# Patient Record
Sex: MALE | Race: WHITE | NOT HISPANIC OR LATINO | Employment: OTHER | URBAN - METROPOLITAN AREA
[De-identification: names, ages, dates, MRNs, and addresses within clinical notes are randomized per-mention and may not be internally consistent; named-entity substitution may affect disease eponyms.]

---

## 2018-11-10 ENCOUNTER — OFFICE VISIT (OUTPATIENT)
Dept: URGENT CARE | Facility: CLINIC | Age: 78
End: 2018-11-10
Payer: MEDICARE

## 2018-11-10 VITALS
SYSTOLIC BLOOD PRESSURE: 129 MMHG | RESPIRATION RATE: 16 BRPM | OXYGEN SATURATION: 99 % | BODY MASS INDEX: 27.2 KG/M2 | TEMPERATURE: 97 F | HEART RATE: 67 BPM | WEIGHT: 190 LBS | DIASTOLIC BLOOD PRESSURE: 81 MMHG | HEIGHT: 70 IN

## 2018-11-10 DIAGNOSIS — H10.32 ACUTE BACTERIAL CONJUNCTIVITIS OF LEFT EYE: ICD-10-CM

## 2018-11-10 DIAGNOSIS — J01.10 ACUTE FRONTAL SINUSITIS, RECURRENCE NOT SPECIFIED: Primary | ICD-10-CM

## 2018-11-10 PROCEDURE — 99213 OFFICE O/P EST LOW 20 MIN: CPT | Mod: S$GLB,,, | Performed by: INTERNAL MEDICINE

## 2018-11-10 RX ORDER — AMOXICILLIN AND CLAVULANATE POTASSIUM 875; 125 MG/1; MG/1
1 TABLET, FILM COATED ORAL 2 TIMES DAILY
Qty: 20 TABLET | Refills: 0 | Status: SHIPPED | OUTPATIENT
Start: 2018-11-10 | End: 2018-11-20

## 2018-11-10 RX ORDER — PREDNISONE 20 MG/1
20 TABLET ORAL DAILY
Qty: 5 TABLET | Refills: 0 | Status: SHIPPED | OUTPATIENT
Start: 2018-11-10 | End: 2018-11-15

## 2018-11-10 NOTE — PATIENT INSTRUCTIONS
Acute Bacterial Rhinosinusitis (ABRS)    Acute bacterial rhinosinusitis (ABRS) is an infection of your nasal cavity and sinuses. Its caused by bacteria. Acute means that youve had symptoms for less than 12 weeks.  Understanding your sinuses  The nasal cavity is the large air-filled space behind your nose. The sinuses are a group of spaces formed by the bones of your face. They connect with your nasal cavity. ABRS causes the tissue lining these spaces to become inflamed. Mucus may not drain normally. This leads to facial pain and other symptoms.  What causes ABRS?  ABRS most often follows an upper respiratory infection caused by a virus. Bacteria then infect the lining of your nasal cavity and sinuses. But you can also get ABRS if you have:  · Nasal allergies  · Long-term nasal swelling and congestion not caused by allergies  · Blockage in the nose  Symptoms of ABRS  The symptoms of ABRS may be different for each person, and can include:  · Nasal congestion  · Runny nose  · Fluid draining from the nose down the throat (postnasal drip)  · Headache  · Cough  · Pain in the sinuses  · Thick, colored fluid from the nose (mucus)  · Fever  Diagnosing ABRS  ABRS may be diagnosed if youve had an upper respiratory infection like a cold and cough for longer than 10 to 14 days. Your health care provider will ask about your symptoms and your medical history. The provider will check your vital signs, including your temperature. Youll have a physical exam. The health care provider will check your ears, nose, and throat. You likely wont need any tests. If ABRS comes back, you may have a culture or other tests.  Treatment for ABRS  Treatment may include:  · Antibiotic medicine. This is for symptoms that last for at least 10 to 14 days.  · Nasal corticosteroid medicine. Drops or spray used in the nose can lessen swelling and congestion.  · Over-the-counter pain medicine. This is to lessen sinus pain and pressure.  · Nasal  decongestant medicine. Spray or drops may help to lessen congestion. Do not use them for more than a few days.  · Salt wash (saline irrigation). This can help to loosen mucus.  Possible complications of ABRS  ABRS may come back or become long-term (chronic).  In rare cases, ABRS may cause complications such as:   · Inflamed tissue around the brain and spinal cord (meningitis)  · Inflamed tissue around the eyes (orbital cellulitis)  · Inflamed bones around the sinuses (osteitis)  These problems may need to be treated in a hospital with intravenous (IV) antibiotic medicine or surgery.  When to call the health care provider  Call your health care provider if you have any of the following:  · Symptoms that dont get better, or get worse  · Symptoms that dont get better after 3 to 5 days on antibiotics  · Trouble seeing  · Swelling around your eyes  · Confusion or trouble staying awake   Date Last Reviewed: 3/3/2015  © 6509-2833 [email or web address]. [address] [place] [address]. All rights reserved. This information is not intended as a substitute for professional medical care. Always follow your healthcare professional's instructions.    Please return here or go to the Emergency Department for any concerns or worsening of condition.  If you were prescribed antibiotics, please take them to completion.  If you were prescribed a narcotic medication, do not drive or operate heavy equipment or machinery while taking these medications.  Please follow up with your primary care doctor or specialist as needed.    If you  smoke, please stop smoking.  1) Motrin/advil/ibuprofen- Take Two to Three Tablets(200 mg) three Times a Day for 5 to 7 Days.  2) Mucinex D 1/2 to 1 Tablet twice a day for 5 to 7 Days.  3) Drink Hot Liquids(coffee,WATER,Tea,Hot Chocolate,or Soup) that you put in a Mug place in Microwave for 2.5 to 3 minutes CHANGE THE CUP THAT WAS USED IN THE MICROWAVE SO AS NOT TO BURN YOUR MOUTH,then sniff  the steam from the cup and sip the heated liquid TEN TO TWELVE TIMES A DAY for 5 to 7 Days.  4) These 3 things will help the antibiotics and other medications work faster and will speed your recovery!

## 2018-11-10 NOTE — PROGRESS NOTES
"Subjective:       Patient ID: Jose Martin Hutchins is a 78 y.o. male.    Vitals:  height is 5' 10" (1.778 m) and weight is 86.2 kg (190 lb). His oral temperature is 97.4 °F (36.3 °C). His blood pressure is 129/81 and his pulse is 67. His respiration is 16 and oxygen saturation is 99%.     Chief Complaint: Eye Problem    Eye Problem    Both eyes are affected.This is a new problem. The current episode started yesterday. The problem occurs constantly. The problem has been gradually worsening. There is no known exposure to pink eye. He does not wear contacts. Associated symptoms include an eye discharge. Pertinent negatives include no eye redness, fever or nausea. The treatment provided no relief.     Review of Systems   Constitution: Negative for chills, fever and malaise/fatigue.   HENT: Positive for congestion. Negative for ear pain, hoarse voice and sore throat.    Eyes: Positive for discharge and pain. Negative for redness.   Cardiovascular: Negative for chest pain, dyspnea on exertion and leg swelling.   Respiratory: Negative for cough, shortness of breath, sputum production and wheezing.    Musculoskeletal: Negative for myalgias.   Gastrointestinal: Negative for abdominal pain and nausea.   Neurological: Negative for headaches.       Objective:      Physical Exam   Constitutional: He is oriented to person, place, and time. He appears well-developed and well-nourished. He is cooperative.  Non-toxic appearance. He does not appear ill. No distress.   HENT:   Head: Normocephalic and atraumatic.   Right Ear: Hearing, external ear and ear canal normal. Tympanic membrane is injected and erythematous.   Left Ear: Hearing, external ear and ear canal normal. Tympanic membrane is injected and erythematous.   Nose: Mucosal edema present. No rhinorrhea or nasal deformity. No epistaxis. Right sinus exhibits frontal sinus tenderness. Right sinus exhibits no maxillary sinus tenderness. Left sinus exhibits frontal sinus tenderness. " Left sinus exhibits no maxillary sinus tenderness.   Mouth/Throat: Uvula is midline and mucous membranes are normal. No trismus in the jaw. Normal dentition. No uvula swelling. Posterior oropharyngeal erythema present.       Eyes: Conjunctivae and lids are normal. No scleral icterus.   Sclera clear bilat   Neck: Trachea normal, full passive range of motion without pain and phonation normal. Neck supple.   Cardiovascular: Normal rate, regular rhythm, normal heart sounds, intact distal pulses and normal pulses.   Pulmonary/Chest: Effort normal and breath sounds normal. No respiratory distress.   Abdominal: Soft. Normal appearance and bowel sounds are normal. He exhibits no distension. There is no tenderness.   Musculoskeletal: Normal range of motion. He exhibits no edema or deformity.   Neurological: He is alert and oriented to person, place, and time. He exhibits normal muscle tone. Coordination normal.   Skin: Skin is warm, dry and intact. He is not diaphoretic. No pallor.   Psychiatric: He has a normal mood and affect. His speech is normal and behavior is normal. Judgment and thought content normal. Cognition and memory are normal.   Nursing note and vitals reviewed.      Assessment:       1. Acute frontal sinusitis, recurrence not specified    2. Acute bacterial conjunctivitis of left eye        Plan:         Acute frontal sinusitis, recurrence not specified  -     amoxicillin-clavulanate 875-125mg (AUGMENTIN) 875-125 mg per tablet; Take 1 tablet by mouth 2 (two) times daily. for 10 days  Dispense: 20 tablet; Refill: 0  -     predniSONE (DELTASONE) 20 MG tablet; Take 1 tablet (20 mg total) by mouth once daily. for 5 days  Dispense: 5 tablet; Refill: 0    Acute bacterial conjunctivitis of left eye  -     amoxicillin-clavulanate 875-125mg (AUGMENTIN) 875-125 mg per tablet; Take 1 tablet by mouth 2 (two) times daily. for 10 days  Dispense: 20 tablet; Refill: 0  -     predniSONE (DELTASONE) 20 MG tablet; Take 1 tablet  (20 mg total) by mouth once daily. for 5 days  Dispense: 5 tablet; Refill: 0       take meds

## 2019-07-10 PROBLEM — G61.81 CIDP (CHRONIC INFLAMMATORY DEMYELINATING POLYNEUROPATHY): Status: ACTIVE | Noted: 2019-07-10

## 2019-07-10 PROBLEM — I10 ESSENTIAL HYPERTENSION: Chronic | Status: ACTIVE | Noted: 2019-07-10

## 2019-07-10 PROBLEM — K21.9 GASTROESOPHAGEAL REFLUX DISEASE WITHOUT ESOPHAGITIS: Chronic | Status: ACTIVE | Noted: 2019-07-10

## 2019-07-10 PROBLEM — I48.0 PAROXYSMAL ATRIAL FIBRILLATION: Chronic | Status: ACTIVE | Noted: 2019-07-10

## 2019-07-10 PROBLEM — I25.10 CAD (CORONARY ARTERY DISEASE): Status: ACTIVE | Noted: 2019-07-10

## 2019-07-11 PROBLEM — R50.9 FEBRILE ILLNESS, ACUTE: Status: ACTIVE | Noted: 2019-07-11

## 2019-07-11 PROBLEM — R63.8 INCREASED NUTRITIONAL NEEDS: Status: ACTIVE | Noted: 2019-07-11

## 2019-07-12 PROBLEM — L89.313 PRESSURE INJURY OF RIGHT BUTTOCK, STAGE 3: Status: ACTIVE | Noted: 2019-07-12

## 2019-07-12 PROBLEM — R32 INCONTINENCE: Status: ACTIVE | Noted: 2019-07-12

## 2019-07-12 PROBLEM — S41.112A SKIN TEAR OF LEFT UPPER ARM WITHOUT COMPLICATION: Status: ACTIVE | Noted: 2019-07-12

## 2019-07-18 PROBLEM — I95.9 HYPOTENSION: Status: ACTIVE | Noted: 2019-07-18

## 2019-07-19 ENCOUNTER — HOSPITAL ENCOUNTER (INPATIENT)
Facility: HOSPITAL | Age: 79
LOS: 10 days | Discharge: NURSING, REHAB OR LONG-TERM CARE | DRG: 393 | End: 2019-07-29
Attending: FAMILY MEDICINE | Admitting: FAMILY MEDICINE
Payer: MEDICARE

## 2019-07-19 DIAGNOSIS — G61.81 CHRONIC INFLAMMATORY DEMYELINATING POLYNEURITIS: ICD-10-CM

## 2019-07-19 DIAGNOSIS — R34 DECREASED URINE OUTPUT: ICD-10-CM

## 2019-07-19 DIAGNOSIS — R94.31 PROLONGED QT INTERVAL: ICD-10-CM

## 2019-07-19 DIAGNOSIS — J18.9 PNEUMONIA OF LEFT UPPER LOBE DUE TO INFECTIOUS ORGANISM: ICD-10-CM

## 2019-07-19 DIAGNOSIS — G61.81 CIDP (CHRONIC INFLAMMATORY DEMYELINATING POLYNEUROPATHY): ICD-10-CM

## 2019-07-19 DIAGNOSIS — D64.9 ANEMIA, UNSPECIFIED TYPE: ICD-10-CM

## 2019-07-19 DIAGNOSIS — K86.9 PANCREATIC LESION: ICD-10-CM

## 2019-07-19 DIAGNOSIS — N17.9 AKI (ACUTE KIDNEY INJURY): Primary | ICD-10-CM

## 2019-07-19 DIAGNOSIS — R06.02 SOB (SHORTNESS OF BREATH): ICD-10-CM

## 2019-07-19 DIAGNOSIS — R07.9 CHEST PAIN: ICD-10-CM

## 2019-07-19 DIAGNOSIS — R91.8 LUNG INFILTRATE ON CT: ICD-10-CM

## 2019-07-19 DIAGNOSIS — I48.91 ATRIAL FIBRILLATION: ICD-10-CM

## 2019-07-19 DIAGNOSIS — K66.1 INTRAPERITONEAL HEMORRHAGE: ICD-10-CM

## 2019-07-19 DIAGNOSIS — A41.9 SEPSIS, DUE TO UNSPECIFIED ORGANISM: ICD-10-CM

## 2019-07-19 DIAGNOSIS — E11.65 TYPE 2 DIABETES MELLITUS WITH HYPERGLYCEMIA, WITHOUT LONG-TERM CURRENT USE OF INSULIN: ICD-10-CM

## 2019-07-19 DIAGNOSIS — R10.84 GENERALIZED ABDOMINAL PAIN: ICD-10-CM

## 2019-07-19 DIAGNOSIS — R94.31 LONG QT INTERVAL: ICD-10-CM

## 2019-07-19 PROBLEM — E11.9 TYPE 2 DIABETES MELLITUS, WITHOUT LONG-TERM CURRENT USE OF INSULIN: Status: ACTIVE | Noted: 2019-07-19

## 2019-07-19 PROBLEM — R56.9 SEIZURE-LIKE ACTIVITY: Status: ACTIVE | Noted: 2019-07-19

## 2019-07-19 PROBLEM — R78.81 BACTEREMIA: Status: ACTIVE | Noted: 2019-07-19

## 2019-07-19 PROBLEM — R33.9 URINARY RETENTION: Status: ACTIVE | Noted: 2019-07-12

## 2019-07-19 LAB
ABO + RH BLD: NORMAL
ALBUMIN SERPL BCP-MCNC: 3.5 G/DL (ref 3.5–5.2)
ALP SERPL-CCNC: 58 U/L (ref 55–135)
ALT SERPL W/O P-5'-P-CCNC: 42 U/L (ref 10–44)
ANION GAP SERPL CALC-SCNC: 15 MMOL/L (ref 8–16)
ANISOCYTOSIS BLD QL SMEAR: SLIGHT
AST SERPL-CCNC: 56 U/L (ref 10–40)
BACTERIA #/AREA URNS HPF: ABNORMAL /HPF
BASOPHILS # BLD AUTO: ABNORMAL K/UL (ref 0–0.2)
BASOPHILS NFR BLD: 0 % (ref 0–1.9)
BILIRUB SERPL-MCNC: 0.8 MG/DL (ref 0.1–1)
BILIRUB UR QL STRIP: NEGATIVE
BLD GP AB SCN CELLS X3 SERPL QL: NORMAL
BUN SERPL-MCNC: 54 MG/DL (ref 8–23)
CALCIUM SERPL-MCNC: 8.8 MG/DL (ref 8.7–10.5)
CHLORIDE SERPL-SCNC: 89 MMOL/L (ref 95–110)
CLARITY UR: CLEAR
CO2 SERPL-SCNC: 24 MMOL/L (ref 23–29)
COLOR UR: YELLOW
CREAT SERPL-MCNC: 2.8 MG/DL (ref 0.5–1.4)
DIFFERENTIAL METHOD: ABNORMAL
EOSINOPHIL # BLD AUTO: ABNORMAL K/UL (ref 0–0.5)
EOSINOPHIL NFR BLD: 0 % (ref 0–8)
ERYTHROCYTE [DISTWIDTH] IN BLOOD BY AUTOMATED COUNT: 15.9 % (ref 11.5–14.5)
EST. GFR  (AFRICAN AMERICAN): 24 ML/MIN/1.73 M^2
EST. GFR  (NON AFRICAN AMERICAN): 21 ML/MIN/1.73 M^2
ESTIMATED AVG GLUCOSE: 166 MG/DL (ref 68–131)
GLUCOSE SERPL-MCNC: 269 MG/DL (ref 70–110)
GLUCOSE UR QL STRIP: NEGATIVE
HBA1C MFR BLD HPLC: 7.4 % (ref 4–5.6)
HCT VFR BLD AUTO: 22.8 % (ref 40–54)
HCT VFR BLD AUTO: 23.8 % (ref 40–54)
HCT VFR BLD AUTO: 25.3 % (ref 40–54)
HCT VFR BLD AUTO: 26 % (ref 40–54)
HGB BLD-MCNC: 7.6 G/DL (ref 14–18)
HGB BLD-MCNC: 7.8 G/DL (ref 14–18)
HGB BLD-MCNC: 8.4 G/DL (ref 14–18)
HGB BLD-MCNC: 8.6 G/DL (ref 14–18)
HGB UR QL STRIP: ABNORMAL
HYALINE CASTS #/AREA URNS LPF: 0 /LPF
HYPOCHROMIA BLD QL SMEAR: ABNORMAL
KETONES UR QL STRIP: ABNORMAL
LACTATE SERPL-SCNC: 2.2 MMOL/L (ref 0.5–2.2)
LEUKOCYTE ESTERASE UR QL STRIP: NEGATIVE
LYMPHOCYTES # BLD AUTO: ABNORMAL K/UL (ref 1–4.8)
LYMPHOCYTES NFR BLD: 4 % (ref 18–48)
MAGNESIUM SERPL-MCNC: 2.1 MG/DL (ref 1.6–2.6)
MCH RBC QN AUTO: 30.4 PG (ref 27–31)
MCHC RBC AUTO-ENTMCNC: 33.1 G/DL (ref 32–36)
MCV RBC AUTO: 92 FL (ref 82–98)
MICROSCOPIC COMMENT: ABNORMAL
MONOCYTES # BLD AUTO: ABNORMAL K/UL (ref 0.3–1)
MONOCYTES NFR BLD: 4 % (ref 4–15)
MYELOCYTES NFR BLD MANUAL: 5 %
NEUTROPHILS NFR BLD: 84 % (ref 38–73)
NEUTS BAND NFR BLD MANUAL: 3 %
NITRITE UR QL STRIP: NEGATIVE
PH UR STRIP: 5 [PH] (ref 5–8)
PHOSPHATE SERPL-MCNC: 5.9 MG/DL (ref 2.7–4.5)
PLATELET # BLD AUTO: 304 K/UL (ref 150–350)
PLATELET BLD QL SMEAR: ABNORMAL
PMV BLD AUTO: 9.4 FL (ref 9.2–12.9)
POCT GLUCOSE: 252 MG/DL (ref 70–110)
POCT GLUCOSE: 302 MG/DL (ref 70–110)
POIKILOCYTOSIS BLD QL SMEAR: SLIGHT
POLYCHROMASIA BLD QL SMEAR: ABNORMAL
POTASSIUM SERPL-SCNC: 5.3 MMOL/L (ref 3.5–5.1)
PROCALCITONIN SERPL IA-MCNC: 2.2 NG/ML
PROT SERPL-MCNC: 5.2 G/DL (ref 6–8.4)
PROT UR QL STRIP: ABNORMAL
RBC # BLD AUTO: 2.83 M/UL (ref 4.6–6.2)
RBC #/AREA URNS HPF: 5 /HPF (ref 0–4)
SODIUM SERPL-SCNC: 128 MMOL/L (ref 136–145)
SP GR UR STRIP: 1.02 (ref 1–1.03)
SPHEROCYTES BLD QL SMEAR: ABNORMAL
STOMATOCYTES BLD QL SMEAR: PRESENT
TSH SERPL DL<=0.005 MIU/L-ACNC: 0.61 UIU/ML (ref 0.4–4)
URN SPEC COLLECT METH UR: ABNORMAL
UROBILINOGEN UR STRIP-ACNC: NEGATIVE EU/DL
VANCOMYCIN SERPL-MCNC: 3.2 UG/ML
WBC # BLD AUTO: 28.41 K/UL (ref 3.9–12.7)
WBC #/AREA URNS HPF: 3 /HPF (ref 0–5)

## 2019-07-19 PROCEDURE — 83605 ASSAY OF LACTIC ACID: CPT

## 2019-07-19 PROCEDURE — S5571 INSULIN DISPOS PEN 3 ML: HCPCS | Performed by: FAMILY MEDICINE

## 2019-07-19 PROCEDURE — 84145 PROCALCITONIN (PCT): CPT

## 2019-07-19 PROCEDURE — 85027 COMPLETE CBC AUTOMATED: CPT | Mod: 91

## 2019-07-19 PROCEDURE — 99222 PR INITIAL HOSPITAL CARE,LEVL II: ICD-10-PCS | Mod: ,,, | Performed by: STUDENT IN AN ORGANIZED HEALTH CARE EDUCATION/TRAINING PROGRAM

## 2019-07-19 PROCEDURE — 27000221 HC OXYGEN, UP TO 24 HOURS

## 2019-07-19 PROCEDURE — 36415 COLL VENOUS BLD VENIPUNCTURE: CPT

## 2019-07-19 PROCEDURE — 85018 HEMOGLOBIN: CPT | Mod: 91

## 2019-07-19 PROCEDURE — 95816 PR EEG,W/AWAKE & DROWSY RECORD: ICD-10-PCS | Mod: 26,,, | Performed by: PSYCHIATRY & NEUROLOGY

## 2019-07-19 PROCEDURE — 86901 BLOOD TYPING SEROLOGIC RH(D): CPT

## 2019-07-19 PROCEDURE — 87040 BLOOD CULTURE FOR BACTERIA: CPT

## 2019-07-19 PROCEDURE — 95816 EEG AWAKE AND DROWSY: CPT | Mod: 26,,, | Performed by: PSYCHIATRY & NEUROLOGY

## 2019-07-19 PROCEDURE — 83036 HEMOGLOBIN GLYCOSYLATED A1C: CPT

## 2019-07-19 PROCEDURE — 99222 1ST HOSP IP/OBS MODERATE 55: CPT | Mod: ,,, | Performed by: STUDENT IN AN ORGANIZED HEALTH CARE EDUCATION/TRAINING PROGRAM

## 2019-07-19 PROCEDURE — 85014 HEMATOCRIT: CPT | Mod: 91

## 2019-07-19 PROCEDURE — 63600175 PHARM REV CODE 636 W HCPCS: Performed by: FAMILY MEDICINE

## 2019-07-19 PROCEDURE — 83735 ASSAY OF MAGNESIUM: CPT

## 2019-07-19 PROCEDURE — 25000003 PHARM REV CODE 250: Performed by: FAMILY MEDICINE

## 2019-07-19 PROCEDURE — 95819 EEG AWAKE AND ASLEEP: CPT

## 2019-07-19 PROCEDURE — 94761 N-INVAS EAR/PLS OXIMETRY MLT: CPT

## 2019-07-19 PROCEDURE — 81000 URINALYSIS NONAUTO W/SCOPE: CPT

## 2019-07-19 PROCEDURE — 80053 COMPREHEN METABOLIC PANEL: CPT | Mod: 91

## 2019-07-19 PROCEDURE — 84100 ASSAY OF PHOSPHORUS: CPT

## 2019-07-19 PROCEDURE — 20000000 HC ICU ROOM

## 2019-07-19 PROCEDURE — 80202 ASSAY OF VANCOMYCIN: CPT

## 2019-07-19 PROCEDURE — 86920 COMPATIBILITY TEST SPIN: CPT

## 2019-07-19 PROCEDURE — 84443 ASSAY THYROID STIM HORMONE: CPT

## 2019-07-19 PROCEDURE — 85007 BL SMEAR W/DIFF WBC COUNT: CPT | Mod: 91

## 2019-07-19 RX ORDER — NYSTATIN 100000 [USP'U]/ML
500000 SUSPENSION ORAL
Status: DISCONTINUED | OUTPATIENT
Start: 2019-07-19 | End: 2019-07-22

## 2019-07-19 RX ORDER — SODIUM CHLORIDE 0.9 % (FLUSH) 0.9 %
10 SYRINGE (ML) INJECTION
Status: DISCONTINUED | OUTPATIENT
Start: 2019-07-19 | End: 2019-07-29 | Stop reason: HOSPADM

## 2019-07-19 RX ORDER — INSULIN ASPART 100 [IU]/ML
1-10 INJECTION, SOLUTION INTRAVENOUS; SUBCUTANEOUS
Status: DISCONTINUED | OUTPATIENT
Start: 2019-07-19 | End: 2019-07-20

## 2019-07-19 RX ORDER — DEXTROSE 50 % IN WATER (D50W) INTRAVENOUS SYRINGE
12.5
Status: DISCONTINUED | OUTPATIENT
Start: 2019-07-19 | End: 2019-07-20

## 2019-07-19 RX ORDER — FAMOTIDINE 10 MG/ML
20 INJECTION INTRAVENOUS DAILY
Status: DISCONTINUED | OUTPATIENT
Start: 2019-07-20 | End: 2019-07-28

## 2019-07-19 RX ORDER — FOLIC ACID 1 MG/1
1 TABLET ORAL DAILY
Status: DISCONTINUED | OUTPATIENT
Start: 2019-07-19 | End: 2019-07-29 | Stop reason: HOSPADM

## 2019-07-19 RX ORDER — POLYETHYLENE GLYCOL 3350 17 G/17G
17 POWDER, FOR SOLUTION ORAL DAILY
Status: DISCONTINUED | OUTPATIENT
Start: 2019-07-19 | End: 2019-07-21

## 2019-07-19 RX ORDER — DEXTROSE 50 % IN WATER (D50W) INTRAVENOUS SYRINGE
12.5
Status: DISCONTINUED | OUTPATIENT
Start: 2019-07-19 | End: 2019-07-29 | Stop reason: HOSPADM

## 2019-07-19 RX ORDER — NOREPINEPHRINE BITARTRATE/D5W 16MG/250ML
0.02 PLASTIC BAG, INJECTION (ML) INTRAVENOUS CONTINUOUS
Status: DISCONTINUED | OUTPATIENT
Start: 2019-07-19 | End: 2019-07-22

## 2019-07-19 RX ORDER — INSULIN ASPART 100 [IU]/ML
1-10 INJECTION, SOLUTION INTRAVENOUS; SUBCUTANEOUS
Status: DISCONTINUED | OUTPATIENT
Start: 2019-07-19 | End: 2019-07-19 | Stop reason: SDUPTHER

## 2019-07-19 RX ORDER — DULOXETIN HYDROCHLORIDE 30 MG/1
30 CAPSULE, DELAYED RELEASE ORAL DAILY
Status: DISCONTINUED | OUTPATIENT
Start: 2019-07-19 | End: 2019-07-29 | Stop reason: HOSPADM

## 2019-07-19 RX ORDER — ACETAMINOPHEN 325 MG/1
650 TABLET ORAL EVERY 8 HOURS PRN
Status: DISCONTINUED | OUTPATIENT
Start: 2019-07-19 | End: 2019-07-29 | Stop reason: HOSPADM

## 2019-07-19 RX ORDER — NITROGLYCERIN 0.4 MG/1
0.4 TABLET SUBLINGUAL EVERY 5 MIN PRN
Status: DISCONTINUED | OUTPATIENT
Start: 2019-07-19 | End: 2019-07-29 | Stop reason: HOSPADM

## 2019-07-19 RX ORDER — HYDROCODONE BITARTRATE AND ACETAMINOPHEN 500; 5 MG/1; MG/1
TABLET ORAL
Status: DISCONTINUED | OUTPATIENT
Start: 2019-07-19 | End: 2019-07-29 | Stop reason: HOSPADM

## 2019-07-19 RX ORDER — PREDNISONE 20 MG/1
20 TABLET ORAL DAILY
Status: DISCONTINUED | OUTPATIENT
Start: 2019-07-19 | End: 2019-07-19

## 2019-07-19 RX ORDER — HYDROCHLOROTHIAZIDE 25 MG/1
25 TABLET ORAL DAILY
Status: DISCONTINUED | OUTPATIENT
Start: 2019-07-19 | End: 2019-07-19

## 2019-07-19 RX ORDER — IBUPROFEN 200 MG
24 TABLET ORAL
Status: DISCONTINUED | OUTPATIENT
Start: 2019-07-19 | End: 2019-07-29 | Stop reason: HOSPADM

## 2019-07-19 RX ORDER — PANTOPRAZOLE SODIUM 40 MG/1
40 TABLET, DELAYED RELEASE ORAL DAILY
Status: DISCONTINUED | OUTPATIENT
Start: 2019-07-19 | End: 2019-07-19

## 2019-07-19 RX ORDER — SODIUM CHLORIDE 9 MG/ML
INJECTION, SOLUTION INTRAVENOUS CONTINUOUS
Status: DISCONTINUED | OUTPATIENT
Start: 2019-07-19 | End: 2019-07-20

## 2019-07-19 RX ORDER — DEXTROSE 50 % IN WATER (D50W) INTRAVENOUS SYRINGE
25
Status: DISCONTINUED | OUTPATIENT
Start: 2019-07-19 | End: 2019-07-20

## 2019-07-19 RX ORDER — GLUCAGON 1 MG
1 KIT INJECTION
Status: DISCONTINUED | OUTPATIENT
Start: 2019-07-19 | End: 2019-07-20

## 2019-07-19 RX ORDER — IBUPROFEN 200 MG
16 TABLET ORAL
Status: DISCONTINUED | OUTPATIENT
Start: 2019-07-19 | End: 2019-07-29 | Stop reason: HOSPADM

## 2019-07-19 RX ORDER — VANCOMYCIN HCL IN 5 % DEXTROSE 1G/250ML
1000 PLASTIC BAG, INJECTION (ML) INTRAVENOUS
Status: DISCONTINUED | OUTPATIENT
Start: 2019-07-19 | End: 2019-07-22

## 2019-07-19 RX ORDER — NAPROXEN SODIUM 220 MG/1
81 TABLET, FILM COATED ORAL DAILY
Status: DISCONTINUED | OUTPATIENT
Start: 2019-07-19 | End: 2019-07-20

## 2019-07-19 RX ORDER — OMEGA-3-ACID ETHYL ESTERS 1 G/1
2 CAPSULE, LIQUID FILLED ORAL 2 TIMES DAILY
Status: DISCONTINUED | OUTPATIENT
Start: 2019-07-19 | End: 2019-07-19 | Stop reason: DRUGHIGH

## 2019-07-19 RX ORDER — HEPARIN SODIUM 5000 [USP'U]/ML
5000 INJECTION, SOLUTION INTRAVENOUS; SUBCUTANEOUS EVERY 8 HOURS
Status: DISCONTINUED | OUTPATIENT
Start: 2019-07-19 | End: 2019-07-20

## 2019-07-19 RX ORDER — METOPROLOL SUCCINATE 25 MG/1
25 TABLET, EXTENDED RELEASE ORAL DAILY
Status: DISCONTINUED | OUTPATIENT
Start: 2019-07-19 | End: 2019-07-19

## 2019-07-19 RX ORDER — LEVETIRACETAM 15 MG/ML
1500 INJECTION INTRAVASCULAR ONCE
Status: DISCONTINUED | OUTPATIENT
Start: 2019-07-19 | End: 2019-07-19

## 2019-07-19 RX ORDER — OXYBUTYNIN CHLORIDE 5 MG/1
5 TABLET ORAL 2 TIMES DAILY
Status: DISCONTINUED | OUTPATIENT
Start: 2019-07-19 | End: 2019-07-20

## 2019-07-19 RX ORDER — GLUCAGON 1 MG
1 KIT INJECTION
Status: DISCONTINUED | OUTPATIENT
Start: 2019-07-19 | End: 2019-07-29 | Stop reason: HOSPADM

## 2019-07-19 RX ORDER — LEVETIRACETAM 5 MG/ML
500 INJECTION INTRAVASCULAR EVERY 12 HOURS
Status: DISCONTINUED | OUTPATIENT
Start: 2019-07-20 | End: 2019-07-19

## 2019-07-19 RX ORDER — FLUCONAZOLE 100 MG/1
100 TABLET ORAL DAILY
Status: DISCONTINUED | OUTPATIENT
Start: 2019-07-19 | End: 2019-07-19 | Stop reason: SDUPTHER

## 2019-07-19 RX ORDER — TAMSULOSIN HYDROCHLORIDE 0.4 MG/1
0.4 CAPSULE ORAL DAILY
Status: DISCONTINUED | OUTPATIENT
Start: 2019-07-19 | End: 2019-07-29 | Stop reason: HOSPADM

## 2019-07-19 RX ORDER — GABAPENTIN 300 MG/1
600 CAPSULE ORAL 3 TIMES DAILY
Status: DISCONTINUED | OUTPATIENT
Start: 2019-07-19 | End: 2019-07-19

## 2019-07-19 RX ORDER — CHOLECALCIFEROL (VITAMIN D3) 25 MCG
1000 TABLET ORAL DAILY
Status: DISCONTINUED | OUTPATIENT
Start: 2019-07-19 | End: 2019-07-19

## 2019-07-19 RX ORDER — DEXTROSE 50 % IN WATER (D50W) INTRAVENOUS SYRINGE
25
Status: DISCONTINUED | OUTPATIENT
Start: 2019-07-19 | End: 2019-07-29 | Stop reason: HOSPADM

## 2019-07-19 RX ORDER — PANTOPRAZOLE SODIUM 40 MG/10ML
40 INJECTION, POWDER, LYOPHILIZED, FOR SOLUTION INTRAVENOUS DAILY
Status: DISCONTINUED | OUTPATIENT
Start: 2019-07-19 | End: 2019-07-19

## 2019-07-19 RX ADMIN — INSULIN DETEMIR 12 UNITS: 100 INJECTION, SOLUTION SUBCUTANEOUS at 09:07

## 2019-07-19 RX ADMIN — VANCOMYCIN HYDROCHLORIDE 1000 MG: 1 INJECTION, POWDER, LYOPHILIZED, FOR SOLUTION INTRAVENOUS at 11:07

## 2019-07-19 RX ADMIN — INSULIN ASPART 8 UNITS: 100 INJECTION, SOLUTION INTRAVENOUS; SUBCUTANEOUS at 03:07

## 2019-07-19 RX ADMIN — PIPERACILLIN AND TAZOBACTAM 4.5 G: 4; .5 INJECTION, POWDER, LYOPHILIZED, FOR SOLUTION INTRAVENOUS; PARENTERAL at 11:07

## 2019-07-19 RX ADMIN — SODIUM CHLORIDE: 0.9 INJECTION, SOLUTION INTRAVENOUS at 03:07

## 2019-07-19 RX ADMIN — PIPERACILLIN AND TAZOBACTAM 4.5 G: 4; .5 INJECTION, POWDER, LYOPHILIZED, FOR SOLUTION INTRAVENOUS; PARENTERAL at 08:07

## 2019-07-19 NOTE — ASSESSMENT & PLAN NOTE
Pt takes oral meds at home.   BG upon admission 269.   Insulin determir 12 units QhS along with moderate dose correcting scale.   Will continue to monitor.

## 2019-07-19 NOTE — PLAN OF CARE
Pt arrived via EMS from [facility]. When patient transferred from stretcher to bed, he had an episode where his eyes rolled back and he was unarrousbale. This episode lasted for 30seconds. Pts SBP 70s. Increased levophed and patient became responsive.  Pt followed commands and oriented to self, , and states he in the hospital. Pt disoriented to time. RR even and unlabored. Pt sats 100% on 2L NC. ST on monitor. NG=785. Afebrile. See VSS per flowsheet. Paged MD that patient is here.

## 2019-07-19 NOTE — ASSESSMENT & PLAN NOTE
Diagnosed several months ago and was on Prednisone and was initially admitted to [facility] for elective plasmapheresis.   Per wife symptoms were worsening.   Neuro consulted: Recs to hold plasma exchange given concern for iatrogenic coagulapathy.   Will wean steroids to 15 mg qd per neuro recs.   Consider IVIG once more hemodynamically stable.

## 2019-07-19 NOTE — PROGRESS NOTES
Pharmacokinetic Initial Assessment: IV Vancomycin    Assessment/Plan:    Initiate intravenous vancomycin with loading dose of n/a  once followed by a maintenance dose of vancomycin 1000mg IV every 24 hours  Desired empiric serum trough concentration is 10 to 20 mcg/mL.  Draw vancomycin trough level 30 min prior to third dose on 7/21 at approximately 0930   Pharmacy will continue to follow and monitor vancomycin.      Please contact pharmacy at extension [phone] with any questions regarding this assessment.     Thank you for the consult,   Elisa Toro     Patient brief summary:  Jose Martin Hutchins is a 78 y.o. male initiated on antimicrobial therapy with IV Vancomycin for treatment of suspected bacteremia    Drug Allergies:   Review of patient's allergies indicates:   Allergen Reactions    Sulfa (sulfonamide antibiotics) Hives    Ramucirumab Palpitations       Actual Body Weight:   77.1 kg  Renal Function:   Estimated Creatinine Clearance: 30.9 mL/min (A) (based on SCr of 2 mg/dL (H)).,     Dialysis Method (if applicable):  N/a     CBC (last 72 hours):  Recent Labs   Lab Result Units 07/17/19  0503 07/18/19  1309 07/18/19  1648 07/18/19  2107 07/19/19  0100 07/19/19  0435   WBC K/uL 13.30* 26.60*  --   --   --  37.60*   Hemoglobin g/dL 11.7* 10.4* 9.0* 8.6* 9.3* 9.4*   Hematocrit % 34.9* 31.7* 28.0* 26.5* 29.9* 29.3*   Platelets K/uL 271 352*  --   --   --  323   Gran% %  --  87.0*  --   --   --  82.0*   Lymph% %  --  3.0*  --   --   --  5.0*   Mono% %  --  4.0  --   --   --  1.0*   Eosinophil% %  --  0.0  --   --   --  0.0   Basophil% %  --  0.0  --   --   --  0.0   Differential Method   --  Manual  --   --   --  Manual       Metabolic Panel (last 72 hours):  Recent Labs   Lab Result Units 07/17/19  0503 07/18/19  0512 07/18/19  1309 07/19/19  0435   Sodium mmol/L 129* 129* 130* 130*   Potassium mmol/L 4.2 4.2 4.2 5.1   Chloride mmol/L 86* 88* 92* 91*   CO2 mmol/L 33* 32* 27 25   Glucose mg/dL 152* 154* 127*  203*   BUN, Bld mg/dL 35* 32* 37* 48*   Creatinine mg/dL 0.90 0.80 1.20 2.00*   Albumin g/dL 3.1* 3.7 3.3* 3.5   Total Bilirubin mg/dL 0.5 0.9 0.8 1.4*   Alkaline Phosphatase U/L 52 24* 29* 48   AST U/L 84* 56 63* 53   ALT U/L 96* 54* 61* 63*   Magnesium mg/dL  --   --  2.2  --    Phosphorus mg/dL  --   --  3.20  --        Drug levels (last 3 results):  No results for input(s): VANCOMYCINRA, VANCOMYCINPE, VANCOMYCINTR in the last 72 hours.    Microbiologic Results:  Microbiology Results (last 7 days)       ** No results found for the last 168 hours. **

## 2019-07-19 NOTE — H&P
[facility] Hospital Medicine  History & Physical    Patient Name: Jose Martin Hutchins  MRN: [id number]  Admission Date: 7/19/2019  Attending Physician: Al Boone III, MD   Primary Care Provider: Sam Washington MD         Patient information was obtained from patient, spouse/SO and ER records.     Subjective:     Principal Problem:Intraperitoneal hemorrhage    Chief Complaint: No chief complaint on file.       HPI: 77 yo male with pmhx of HTN, CAD, HLD, CKD, GERD, RA, CIDP, Guillain Burre syndrome and A.fib was transferred from [facility] for IR to drain his intraperitoneal hemorrhage. He was admitted in [facility] 8 days ago. He was initially admitted for elective plasmapheresis since his CIDP was worsening. During his stay he became septic and was found to have UTI. He was treated for UTI. While he was in the general floor, he developed fever, hypotensive and became hemodynamically unstable with a drop in his h/h. He had a CT abdomen done which was positive for intraperitoneal hemorrhage that needed to be assessed by IR for possible draining and embolizing the source.   During his transfer to [facility], EMS noticed that the pt had few episodes where his eyes rolled back and his body was shaking. He was also noticed to have similar episode when he was first seen in the ICU. During those episodes his BP was noticeably low as well. Pt was awake upon verbal command. He knows his name but is not oriented to time. He denies any chest pain, sob, nausea, vomiting. He does complaint of pain in his right side of the abdomen.  He has elevated WBC, but no growth from cultures in the outside facility.            No new subjective & objective note has been filed under this hospital service since the last note was generated.    Assessment/Plan:     * Intraperitoneal hemorrhage  Pt transferred from [facility] with the concern for intraperitoneal hemorrhage for IR consult.   CT abdomen: Acute approximately 15  x 4 cm hematoma within the region of the lesser sac with signs of active contrast extravasation.  2. Small amount of perihepatic fluid.  CTA abdomen: Left upper lobe pneumonia. Trace left pleural fluid collection. Mild dependent atelectasis bilaterally.  IR consulted: will appreciate recs.   GS consulted: will appreciate recs.   Will trend H/H q6hs.       ANDIE (acute kidney injury)  BUN/Cr upon admission: 54/2.8. Baseline 35/1.2.   Likely 2/2 medications vs intravascular volume depletion.   Will start IVF and continue to monitor.   Avoid nephrotoxic agents.       Type 2 diabetes mellitus, without long-term current use of insulin  Pt takes oral meds at home.   BG upon admission 269.   Insulin determir 12 units QhS along with moderate dose correcting scale.   Will continue to monitor.       Sepsis  Likely 2/2 PNA vs intraperitoneal hemorrhage vs hospital acquired PNA.   WBC upon admission was 28, pt hypotensive requiring pressors.   clx from the outside facility with no growth. Pt was already on Vanc and Zosyn from the outside facility.   LA, procalc ordered.   Vanc and zosyn continued.   Levophed continued.   Blood cultures, sputum cultures reordered.   CTA: positive for possible PNA in the left lower lobe.   ID consulted.       Seizure-like activity  Seizure like activity noticed, likely 2/2 hypotensive episodes.  EEG ordered.   Neuro consulted  Neuro checks q4h, will do stat CT with worsening mental status or decreased LOC.   Speech eval for swallowing.   2 mg Ativan IV for generalized seizures lasting > 5 min  Delirium and seizure precautions.       Urinary retention  Pt presented from the [facility] with the becerril for urinary retention.   Minimal urine output.  Increase in Bun/Cr. Will initiate fluids.   Will monitor I/O's.         Pressure injury of right buttock, stage 3  Wound care consulted.       Paroxysmal atrial fibrillation  Pt had A.fib in the outside facility, but upon admission he was NSR.    Will continue to monitor.     Essential hypertension  Will hold bp meds for now, since pt is hypotensive and requiring pressors.       CIDP (chronic inflammatory demyelinating polyneuropathy)  Diagnosed several months ago and was on Prednisone and was initially admitted to [facility] for elective plasmapheresis.   Per wife symptoms were worsening.   Neuro consulted: Recs to hold plasma exchange given concern for iatrogenic coagulapathy.   Will wean steroids to 15 mg qd per neuro recs.   Consider IVIG once more hemodynamically stable.         VTE Risk Mitigation (From admission, onward)        Ordered     heparin (porcine) injection 5,000 Units  Every 8 hours      07/19/19 1452     IP VTE HIGH RISK PATIENT  Once      07/19/19 1452     Place JANE hose  Until discontinued      07/19/19 0910     Place sequential compression device  Until discontinued      07/19/19 0910        Critical care time spent on the evaluation and treatment of severe organ dysfunction, review of pertinent labs and imaging studies, discussions with consulting providers and discussions with patient/family: 60 minutes.     Tamara Campos MD  Department of Hospital Medicine   [facility]

## 2019-07-19 NOTE — HPI
79 yo male with pmhx of HTN, CAD, HLD, CKD, GERD, RA, CIDP, Guillain Burre syndrome and A.fib was transferred from [facility] for IR to drain his intraperitoneal hemorrhage. He was admitted in [facility] 8 days ago. He was initially admitted for elective plasmapheresis since his CIDP was worsening. During his stay he became septic and was found to have UTI. He was treated for UTI. While he was in the general floor, he developed fever, hypotensive and became hemodynamically unstable with a drop in his h/h. He had a CT abdomen done which was positive for intraperitoneal hemorrhage that needed to be assessed by IR for possible draining and embolizing the source.   During his transfer to [facility], EMS noticed that the pt had few episodes where his eyes rolled back and his body was shaking. He was also noticed to have similar episode when he was first seen in the ICU. During those episodes his BP was noticeably low as well. Pt was awake upon verbal command. He knows his name but is not oriented to time. He denies any chest pain, sob, nausea, vomiting. He does complaint of pain in his right side of the abdomen.  He has elevated WBC, but no growth from cultures in the outside facility.

## 2019-07-19 NOTE — PROCEDURES
ROUTINE ELECTROENCEPHALOGRAM REPORT      Jose Martin Hutchins  [id number]  1940    DATE OF SERVICE: 7/19/19    REQUESTING PROVIDER: Tamara Campos MD    METHODOLOGY   Electroencephalographic (EEG) recording is with electrodes placed according to the International 10-20 placement system.  Thirty two (32) channels of digital signal (sampling rate of 512/sec) including T1 and T2 was simultaneously recorded from the scalp and may include  EKG, EMG, and/or eye monitors.  Recording band pass was 0.1 to 512 hz.  Digital video recording of the patient is simultaneously recorded with the EEG.  The patient is instructed report clinical symptoms which may occur during the recording session.  EEG and video recording is stored and archived in digital format. Activation procedures which include photic stimulation, hyperventilation and instructing patients to perform simple task are done in selected patients.    The EEG is displayed on a monitor screen and can be reviewed using different montages.  Computer assisted analysis is employed to detect spike and electrographic seizure activity.   The entire record is submitted for computer analysis.  The entire recording is visually reviewed and the times identified by computer analysis as being spikes or seizures are reviewed again.  Compresses spectral analysis (CSA) is also performed on the activity recorded from each individual channel.  This is displayed as a power display of frequencies from 0 to 30 Hz over time.   The CSA is reviewed looking for asymmetries in power between homologous areas of the scalp and then compared with the original EEG recording.     [employer] software was also utilized in the review of this study.  This software suite analyzes the EEG recording in multiple domains.  Coherence and rhythmicity is computed to identify EEG sections which may contain organized seizures.  Each channel undergoes analysis to detect presence of spike and sharp waves which have  special and morphological characteristic of epileptic activity.  The routine EEG recording is converted from spacial into frequency domain.  This is then displayed comparing homologous areas to identify areas of significant asymmetry.  Algorithm to identify non-cortically generated artifact is used to separate eye movement, EMG and other artifact from the EEG    EEG FINDINGS  Background activity:   The background rhythm was characterized by theta (5-6 Hz) activity   No posterior dominant rhythm seen.   Symmetry and continuity: The background was continuous and symmetric    Sleep:   Not seen.    Activation procedures:   Hyperventilation and photic stimulation were not performed    Abnormal activity:   No epileptiform discharges, periodic discharges, lateralized rhythmic delta activity or electrographic seizures were seen.    EKG:   Regular rhythm seen    IMPRESSION:   This is an abnormal routine EEG due to the moderate generalized slowing seen, suggestive of moderate diffuse or multifocal cerebral dysfunction.  No epileptiform activity or electrographic seizures seen.    CLINICAL CORRELATION IS RECOMMENDED.    Mariel Coker MD, LUL(), DARIO CARTAGENA.  Neurology-Epilepsy.  [facility].

## 2019-07-19 NOTE — ASSESSMENT & PLAN NOTE
Likely 2/2 PNA vs intraperitoneal hemorrhage vs hospital acquired PNA.   WBC upon admission was 28, pt hypotensive requiring pressors.   clx from the outside facility with no growth. Pt was already on Vanc and Zosyn from the outside facility.   LA, procalc ordered.   Vanc and zosyn continued.   Levophed continued.   Blood cultures, sputum cultures reordered.   CTA: positive for possible PNA in the left lower lobe.   ID consulted.

## 2019-07-19 NOTE — ASSESSMENT & PLAN NOTE
Pt presented from the [facility] with the becerril for urinary retention.   Lasix was started  Urine output was 2.9L  Continue to monitor

## 2019-07-19 NOTE — CONSULTS
"NEUROLOGY FLOOR CONSULT    Reason for consult:  Seizure like event    Informant:  Patient' family       Other sources of information : past medical records    CC:  "seizure"    HPI:   Jose Martin Hutchins is a 78 y.o. man w/ hx of HTN, CAD, P-afib, recent diagnosis of CIDP, recurrent infections admitted to ICU as direct transfer from [facility] for higher level of care and interventional radiology services due to suspected retroperitoneal hematoma discovered after patient became acutely hypotensive/hemodynamically unstable w/ drop in H/H. Patient had been admitted originally for elective plasmapheresis which had been planned with outpatient neurologist after failing to respond to oral steroids in the ambulatory setting. His current hospital course was initially complicated by UTI/urinary retention/worsening weakness, however this improved after initiation of antibiotics. Per discussion with his wife at bedside, he was diagnosed with CIDP months ago, however over the last several weeks he acutely worsened to the point where he was unable to move his limbs or feed himself. Family reports significant improvement following initial 2 plasma exchange treatments, before he decompensated again due to internal bleeding +/- underlying infection. Upon arrival to unit this morning, patient was noted to have gaze deviation and was briefly unresponsive when transferred from stretcher to bed, his SBP was in the 70s at the time, neurology consulted for possible seizure activity.     Patient admits to compliance most of the time    ROS: facial/extremity paraesthesia, weakness    Histories:     Allergies:  Sulfa (sulfonamide antibiotics) and Ramucirumab    Current Medications:    Current Facility-Administered Medications   Medication Dose Route Frequency Provider Last Rate Last Dose    acetaminophen tablet 650 mg  650 mg Oral Q8H PRN Tamara Campos MD        aspirin chewable tablet 81 mg  81 mg Oral Daily Tamara Campos MD    "     dextrose 50 % in water (D50W) injection 12.5 g  12.5 g Intravenous PRN Tamara Campos MD        dextrose 50 % in water (D50W) injection 25 g  25 g Intravenous PRN Tamara Campos MD        DULoxetine DR capsule 30 mg  30 mg Oral Daily Tamara Campos MD        folic acid tablet 1 mg  1 mg Oral Daily Tamara Campos MD        glucagon (human recombinant) injection 1 mg  1 mg Intramuscular PRN Tamara Campos MD        glucose chewable tablet 16 g  16 g Oral PRN Tamara Campos MD        glucose chewable tablet 24 g  24 g Oral PRN Tamara Campos MD        hydroCHLOROthiazide tablet 25 mg  25 mg Oral Daily Tamara Campos MD        insulin aspart U-100 pen 1-10 Units  1-10 Units Subcutaneous QID (AC + HS) PRN Tamara Camops MD        insulin detemir U-100 pen 12 Units  12 Units Subcutaneous QHS Tamara Campos MD        Lactobacillus acidoph-L.bulgar 1 million cell tablet 1 tablet  1 tablet Oral TID  Tamara Campos MD        magic mouthwash (diphenhydrAMINE 12.5 mg/5 mL 20 mL, aluminum & magnesium hydroxide-simethicone (MYLANTA) 20 mL, lidocaine HCl 2% (XYLOCAINE) 20 mL) solution  15 mL Swish & Spit Q4H PRN Tamara Campos MD        metoprolol succinate (TOPROL-XL) 24 hr tablet 25 mg  25 mg Oral Daily Tamara Campos MD        nitroGLYCERIN SL tablet 0.4 mg  0.4 mg Sublingual Q5 Min PRN Tamara Campos MD        norepinephrine bitartrate-D5W 16 mg/250 mL (64 mcg/mL) infusion  0.02 mcg/kg/min Intravenous Continuous Tamara Campos MD 5.8 mL/hr at 07/19/19 0942 0.08 mcg/kg/min at 07/19/19 0942    nystatin 100,000 unit/mL suspension 500,000 Units  500,000 Units Oral TID  Tamara Campos MD        omega-3 fatty acids-fish oil 340-1,000 mg capsule 1 capsule  1 capsule Oral Daily Jr Spivey MD        oxybutynin tablet 5 mg  5 mg Oral BID Tamara Campos MD        pantoprazole EC tablet 40 mg  40 mg Oral Daily Tamara Campos MD        piperacillin-tazobactam 4.5 g in dextrose 5 % 100 mL IVPB (ready to mix system)  4.5 g  Intravenous Q8H Al Boone III, MD        polyethylene glycol packet 17 g  17 g Oral Daily Tamara Campos MD        predniSONE tablet 20 mg  20 mg Oral Daily Tamara Campos MD        sodium chloride 0.9% flush 10 mL  10 mL Intravenous PRN Tamara Campos MD        sodium chloride 0.9% flush 10 mL  10 mL Intravenous PRN Tamara Campos MD        tamsulosin 24 hr capsule 0.4 mg  0.4 mg Oral Daily Tamara Campos MD        vancomycin in dextrose 5 % 1 gram/250 mL IVPB 1,000 mg  1,000 mg Intravenous Q24H Al Boone III, MD        vitamin D 1000 units tablet 1,000 Units  1,000 Units Oral Daily Tamara Campos MD           Past Medical/Surgical/Family History:  Medical:   Past Medical History:   Diagnosis Date    A-fib     Coronary artery disease     GERD (gastroesophageal reflux disease)     Hypertension     Kidney function abnormal     Rheumatoid arthritis       Surgeries:   Past Surgical History:   Procedure Laterality Date    APPENDECTOMY      CORONARY ARTERY BYPASS GRAFT (CABG)      3 vessel    HERNIA REPAIR      INSERTION, CATHETER, VASCULAR, DUAL LUMEN N/A 7/10/2019    Performed by [facility]illa [facility] at [facility] ENDO    MULTIPLE TOOTH EXTRACTIONS  2019    3 teeth    TONSILLECTOMY        Family:   Family History   Problem Relation Age of Onset    No Known Problems Mother     No Known Problems Father      Social History:  See medicine HP      Current Evaluation:     Vital Signs:   Vitals:    07/19/19 0930   BP: (!) 106/55   Pulse: 106   Resp: (!) 25   Temp:       Neurological Exam (limited due to patient participation)  Older man, resting in bed, eyes open to voice  requires repeated prompting to participate in exam   oriented to person, situation, family, not place, day, or year, following simple one step requests with repeated prompting  EOMI, PERRL, visual fields full, facial sensation decreased on the left VI-III, no facial asymmetry  4/5 biceps, 3/5 triceps, hand  bilaterally  Unable to resist  gravity in the lower extremities  2/5 knee flexion, 3/5 knee extension  Sensation to light touch intact x 4  Absent reflexes, no clonus, no plantar response  Slight dysarthria    LABORATORY STUDIES:  Recent Results (from the past 24 hour(s))   Protime-INR    Collection Time: 07/18/19  1:09 PM   Result Value Ref Range    PT 20.2 (H) 12.2 - 14.6 sec    INR 1.7 (H) <1.2   Comprehensive metabolic panel    Collection Time: 07/18/19  1:09 PM   Result Value Ref Range    Sodium 130 (L) 136 - 145 mmol/L    Potassium 4.2 3.5 - 5.1 mmol/L    Chloride 92 (L) 95 - 110 mmol/L    CO2 27 23 - 29 mmol/L    Glucose 127 (H) 74 - 106 mg/dL    BUN, Bld 37 (H) 9 - 20 mg/dL    Creatinine 1.20 0.80 - 1.50 mg/dL    Calcium 8.4 8.4 - 10.2 mg/dL    Total Protein 5.1 (L) 6.3 - 8.2 g/dL    Albumin 3.3 (L) 3.5 - 5.2 g/dL    Total Bilirubin 0.8 0.2 - 1.3 mg/dL    Alkaline Phosphatase 29 (L) 38 - 145 U/L    AST 63 (H) 17 - 59 U/L    ALT 61 (H) 10 - 44 U/L    eGFR if African American >60 >60 mL/min/1.73 m^2    eGFR if non African American 58 (A) >60 mL/min/1.73 m^2   CBC auto differential    Collection Time: 07/18/19  1:09 PM   Result Value Ref Range    WBC 26.60 (H) 3.90 - 12.70 K/uL    RBC 3.31 (L) 4.60 - 6.20 M/uL    Hemoglobin 10.4 (L) 14.0 - 18.0 g/dL    Hematocrit 31.7 (L) 40.0 - 54.0 %    Mean Corpuscular Volume 96 82 - 98 fL    Mean Corpuscular Hemoglobin 31.5 (H) 27.0 - 31.0 pg    Mean Corpuscular Hemoglobin Conc 33.0 32.0 - 36.0 g/dL    RDW 16.3 (H) 11.5 - 14.5 %    Platelets 352 (H) 150 - 350 K/uL    MPV 8.0 (L) 9.2 - 12.9 fL    nRBC 0 0 /100 WBC    Gran% 87.0 (H) 38.0 - 73.0 %    Lymph% 3.0 (L) 18.0 - 48.0 %    Mono% 4.0 4.0 - 15.0 %    Eosinophil% 0.0 0.0 - 8.0 %    Basophil% 0.0 0.0 - 1.9 %    Bands 2.0 %    Metamyelocytes 4.0 %    Platelet Estimate Appears normal     Aniso Slight     Poik Slight     Ovalocytes Occasional     Tear Drop Cells Occasional     Differential Method Manual    Magnesium    Collection Time: 07/18/19  1:09 PM    Result Value Ref Range    Magnesium 2.2 1.6 - 2.6 mg/dL   Phosphorus    Collection Time: 07/18/19  1:09 PM   Result Value Ref Range    Phosphorus 3.20 2.40 - 4.50 mg/dL   Fibrinogen    Collection Time: 07/18/19  1:09 PM   Result Value Ref Range    Fibrinogen 81 (L) 227 - 474 mg/dL   D dimer, quantitative    Collection Time: 07/18/19  1:09 PM   Result Value Ref Range    D-Dimer 2.94 (H) <0.49 mcg/mL   C Diff Toxin by PCR    Collection Time: 07/18/19  2:19 PM   Result Value Ref Range    C. diff PCR Negative Negative    C diff Toxin by PCR (Epi 027) Presumptive Negative Negative   Occult blood x 1, stool    Collection Time: 07/18/19  2:19 PM   Result Value Ref Range    Occult Blood Negative Negative   POCT glucose    Collection Time: 07/18/19  4:46 PM   Result Value Ref Range    POC Glucose 152 (A) 74 - 106 mg/dL   Prepare Cryoprecipitate 1 Dose    Collection Time: 07/18/19  4:48 PM   Result Value Ref Range    UNIT NUMBER [id number]     Product Code [id number]     DISPENSE STATUS TRANSFUSED     CODING SYSTEM [id number]     Unit Blood Type Code 6200     Unit Blood Type A POS     Unit Expiration [id number]    Type & Screen    Collection Time: 07/18/19  4:48 PM   Result Value Ref Range    Group & Rh O POS     Indirect Víctor GEL NEG    Hemoglobin    Collection Time: 07/18/19  4:48 PM   Result Value Ref Range    Hemoglobin 9.0 (L) 14.0 - 18.0 g/dL   Hematocrit    Collection Time: 07/18/19  4:48 PM   Result Value Ref Range    Hematocrit 28.0 (L) 40.0 - 54.0 %   Prepare RBC 1 Unit    Collection Time: 07/18/19  4:48 PM   Result Value Ref Range    UNIT NUMBER [id number]     Product Code [id number]     DISPENSE STATUS TRANSFUSED     CODING SYSTEM [id number]     Unit Blood Type Code 5100     Unit Blood Type O POS     Unit Expiration [id number]    Hemoglobin    Collection Time: 07/18/19  9:07 PM   Result Value Ref Range    Hemoglobin 8.6 (L) 14.0 - 18.0 g/dL   Hematocrit    Collection Time: 07/18/19  9:07 PM   Result Value Ref  Range    Hematocrit 26.5 (L) 40.0 - 54.0 %   POCT glucose    Collection Time: 07/18/19  9:55 PM   Result Value Ref Range    POC Glucose 254 (A) 74 - 106 mg/dL   Hemoglobin    Collection Time: 07/19/19  1:00 AM   Result Value Ref Range    Hemoglobin 9.3 (L) 14.0 - 18.0 g/dL   Hematocrit    Collection Time: 07/19/19  1:00 AM   Result Value Ref Range    Hematocrit 29.9 (L) 40.0 - 54.0 %   Comprehensive metabolic panel    Collection Time: 07/19/19  4:35 AM   Result Value Ref Range    Sodium 130 (L) 136 - 145 mmol/L    Potassium 5.1 3.5 - 5.1 mmol/L    Chloride 91 (L) 95 - 110 mmol/L    CO2 25 23 - 29 mmol/L    Glucose 203 (H) 74 - 106 mg/dL    BUN, Bld 48 (H) 9 - 20 mg/dL    Creatinine 2.00 (H) 0.80 - 1.50 mg/dL    Calcium 8.4 8.4 - 10.2 mg/dL    Total Protein 5.7 (L) 6.3 - 8.2 g/dL    Albumin 3.5 3.5 - 5.2 g/dL    Total Bilirubin 1.4 (H) 0.2 - 1.3 mg/dL    Alkaline Phosphatase 48 38 - 145 U/L    AST 53 17 - 59 U/L    ALT 63 (H) 10 - 44 U/L    eGFR if African American 36 (A) >60 mL/min/1.73 m^2    eGFR if non African American 31 (A) >60 mL/min/1.73 m^2   CBC auto differential    Collection Time: 07/19/19  4:35 AM   Result Value Ref Range    WBC 37.60 (HH) 3.90 - 12.70 K/uL    RBC 3.10 (L) 4.60 - 6.20 M/uL    Hemoglobin 9.4 (L) 14.0 - 18.0 g/dL    Hematocrit 29.3 (L) 40.0 - 54.0 %    Mean Corpuscular Volume 95 82 - 98 fL    Mean Corpuscular Hemoglobin 30.3 27.0 - 31.0 pg    Mean Corpuscular Hemoglobin Conc 32.0 32.0 - 36.0 g/dL    RDW 17.0 (H) 11.5 - 14.5 %    Platelets 323 150 - 350 K/uL    MPV 7.7 (L) 9.2 - 12.9 fL    Lymph # CANCELED 1.0 - 4.8 K/uL    Mono # CANCELED 0.3 - 1.0 K/uL    Eos # CANCELED 0.0 - 0.5 K/uL    Baso # CANCELED 0.00 - 0.20 K/uL    nRBC 0 0 /100 WBC    Gran% 82.0 (H) 38.0 - 73.0 %    Lymph% 5.0 (L) 18.0 - 48.0 %    Mono% 1.0 (L) 4.0 - 15.0 %    Eosinophil% 0.0 0.0 - 8.0 %    Basophil% 0.0 0.0 - 1.9 %    Bands 8.0 %    Metamyelocytes 4.0 %    Platelet Estimate Appears normal     Aniso Slight      Ovalocytes Occasional     Differential Method Manual    Fibrinogen    Collection Time: 07/19/19  6:22 AM   Result Value Ref Range    Fibrinogen 272 227 - 474 mg/dL   APTT    Collection Time: 07/19/19  6:22 AM   Result Value Ref Range    aPTT 34.6 25.2 - 35.6 sec   Protime-INR    Collection Time: 07/19/19  6:22 AM   Result Value Ref Range    PT 16.6 (H) 12.2 - 14.6 sec    INR 1.3 (H) <1.2       Thyroid not examined  HgA1C%:  Not collected   Vit B12: Not collected  Folate:  Not collected    RADIOLOGY STUDIES:  I have personally reviewed the images performed.     HEAD CT: no recent study    BRAIN MRI chronic changes      A&P   79 y/o man w/ hx of HTN, p-afib, CAD, CIDP, recurrent infections transferred to [facility] for IR services on pressor support due to hemodynamic instability from acute blood loss after he was found to have retroperitoneal hemorrhage which may have been related to acute coagulapathy following plasma exchange. Noted to have episode of brief unresponsiveness associated with acute hypotension, neurology consulted due to concern for seizure activity. Currently encephalopathic, but oriented to self and situation, following requests, earlier episode likely related to hypotension; however if he has underlying infection this could also lower seizure threshold.    Transient unresponsiveness  - acutely hypotensive during episode  - continue telemetry, q 4 h neurochecks, stat CT head w/ worsening mental status/decreased LOC  - recommend NPO until evaluated by speech; patient may require NG tube if not swallowing/handling secretions appropriately  - SBP<180, MAPs> 70; pressors as needed  - BG<180 SSi as needed  - Recommend not starting any AEDs at this time unless he has continued seizure activity while hemodynamically stable  - 2 mg Ativan IV for generalized seizures lasting > 5 min  - Spot EEG w/ diffuse slowing consistent with encephalopathy, no epileptiform activity reported   - metabolic abnormalities/empiric  antibiotics per primary; avoid cefepime as this can worsen AMS  - delirium precautions, limit sedation    CIDP  - Diagnosed by outpatient neurologist several months ago per wife  - no improvement to prednisone 60 mg qd previously, admitted for elective PE with significant response per family  - recommend withholding additional plasma exchange therapies given concern for iatrogenic coagulapathy  - recommend weaning steroids to 15 mg qd if able to do so without increasing pressor requirements as his chronic steroid therapy may be contributing to recurrent infections  - can consider IVIG while inpatient once clinically stable  - patient will likely benefit from subcutaneous IG as outpatient for continued treatment of CIDP once outside of acute illness    Case discussed with Dr. Jones, staff MD.    We will follow with you, please page Neurology resident on call with any further questions.    Jak Encinas MD  [facility]

## 2019-07-19 NOTE — ASSESSMENT & PLAN NOTE
Minimal urine output.  Increase in Bun/Cr. Will initiate fluids.   Will monitor I/O's.   Pt has becerril in place.

## 2019-07-19 NOTE — PROGRESS NOTES
Pt does not have an active GI bleed or a GI consult. Protonix automatically substituted for Famotidine IV, per P&T protocol, as the pt does not qualify for IV-to-po interchange. WBC is elevated and trending up, and currently on a Levophed gtt.

## 2019-07-19 NOTE — CONSULTS
U Infectious Disease Consult     Primary Team: Family Medicine  Consultant Attending: Segundo  Date of Admit: 7/19/2019    Reason for Consult     WBC increasing; has an intraperitoneal hemorrhage. Decreased steroid dose but concern for other infectious process.    History of Present Illness   Jose Martin Hutchins is a 78 y.o. male with a relevant history of dyslipidemia, CAD, HTN, mitral regurgitation, tricuspid regurgitation, chronic renal disease, GERD, rheumatoid arthritis, and chronic inflammatory demyelinating polyneuritis.    The patient presented to [facility] on 7/19/2019 as a direct admit to the ICU from [facility] for high level of care and interventional radiology services due to suspected retroperitoneal hematoma.    The patient was initially admitted at [facility] for elective plasmapheresis planned by an outside neurologist for his worsening CIPD. During admission, he had a fever of 102 at which time urine cultures, blood cultures, and urinalysis were done. Cultures remained negative but urinalysis was suspect for UTI which was treated with vancomycin and ceftriaxone for 7 days. Per family, they report he was improving after the two initial plasma exchange treatments but acutely decompensated and became hypotensive yesterday afternoon. At this time, hemoglobin dropped from 11 to 9 and CT chest and abdomen showed upper lobe pneumonia and large hematoma in the lesser sac.    Upon arrival to the [facility] ICU, he became unresponsive while being transferred to the bed with systolic BP in the 70's. He has remained afebrile.    Review of Systems (positives in bold):  Constitutional:fever, chills, night sweats, fatigue, malaise  HEENT: epistaxis, rhinorrhea, sore throat  Urogenital: frequency, dysuria, hematuria, retention, incontinence, discharge  Neurological: headaches, syncope, weakness, numbness, paresthesia  Gastrointestinal: anorexia, nausea, vomiting, melena, hematochezia, abdominal  pain, hematemesis, diarrhea, constipation  Respiratory: dyspnea, cough, sputum production, wheeze, hemoptysis  Integumentary:  rash, lesions (buttock), pruritus  Cardiovascular: chest pain, orthopnea, edema, claudication  Hematological: bleeding, bruising  Musculoskeletal: arthralgia, myalgia, joint swelling, stiffness, back pain    Allergies:  Review of patient's allergies indicates:   Allergen Reactions    Sulfa (sulfonamide antibiotics) Hives    Ramucirumab Palpitations     Medications:   In-Hospital Scheduled Medications:   aspirin  81 mg Oral Daily    DULoxetine  30 mg Oral Daily    folic acid  1 mg Oral Daily    hydroCHLOROthiazide  25 mg Oral Daily    insulin detemir U-100  12 Units Subcutaneous QHS    Lactobacillus acidoph-L.bulgar  1 tablet Oral TID WM    metoprolol succinate  25 mg Oral Daily    nystatin  500,000 Units Oral TID WM    omega-3 fatty acids-fish oil  1 capsule Oral Daily    oxybutynin  5 mg Oral BID    pantoprazole  40 mg Oral Daily    piperacillin-tazobactam (ZOSYN) IVPB  4.5 g Intravenous Q8H    polyethylene glycol  17 g Oral Daily    predniSONE  20 mg Oral Daily    tamsulosin  0.4 mg Oral Daily    vancomycin (VANCOCIN) IVPB  1,000 mg Intravenous Q24H    vitamin D  1,000 Units Oral Daily      In-Hospital PRN Medications:  acetaminophen, dextrose 50 % in water (D50W), dextrose 50 % in water (D50W), glucagon (human recombinant), glucose, glucose, insulin aspart U-100, magic mouthwash (diphenhydrAMINE 12.5 mg/5 mL 20 mL, aluminum & magnesium hydroxide-simethicone (MYLANTA) 20 mL, lidocaine HCl 2% (XYLOCAINE) 20 mL) solution, nitroGLYCERIN, sodium chloride 0.9%, sodium chloride 0.9%   In-Hospital IV Infusion Medications:   norepinephrine bitartrate-D5W 0.06 mcg/kg/min (07/19/19 1101)     Relevant Home Medications:    Antibiotics and Day Number of Therapy:  Vancomycin: 7/18 - current  Zosyn: 7/18 - current    Past Medical History:  Past Medical History:   Diagnosis Date     "A-fib     Coronary artery disease     GERD (gastroesophageal reflux disease)     Hypertension     Kidney function abnormal     Rheumatoid arthritis      Past Surgical History/ObGyn Hx if gender appropriate:  Past Surgical History:   Procedure Laterality Date    APPENDECTOMY      CORONARY ARTERY BYPASS GRAFT (CABG)      3 vessel    HERNIA REPAIR      INSERTION, CATHETER, VASCULAR, DUAL LUMEN N/A 7/10/2019    Performed by [facility]illa [facility] at [facility]    MULTIPLE TOOTH EXTRACTIONS  2019    3 teeth    TONSILLECTOMY       Family History:  Family History   Problem Relation Age of Onset    No Known Problems Mother     No Known Problems Father      Social History:  Social History     Tobacco Use    Smoking status: Never Smoker    Smokeless tobacco: Never Used   Substance Use Topics    Alcohol use: No     Frequency: Never    Drug use: No     Objective   Last 24 Hour Vital Signs:  BP  Min: 72/50  Max: 119/66  Temp  Av.1 °F (36.7 °C)  Min: 96.9 °F (36.1 °C)  Max: 98.6 °F (37 °C)  Pulse  Av.9  Min: 69  Max: 112  Resp  Av  Min: 19  Max: 41  SpO2  Av.1 %  Min: 87 %  Max: 100 %  Height  Av' 9.4" (176.3 cm)  Min: 5' 9" (175.3 cm)  Max: 5' 10" (177.8 cm)  Weight  Av.7 kg (173 lb 8.9 oz)  Min: 77.1 kg (170 lb)  Max: 84.9 kg (187 lb 2.7 oz)    Lines, Drains, Airway:  Trialysis catheter - 7/10  Esparza Catheter -     Physical Examination:  Examination  General: Patient sleepy but easily aroused; lying comfortably in NAD. Obese body habitus.  HEENT: normocephalic, atraumatic, EOMI; no thrush noted  Neck: No thyromegaly or masses   Cardiac: RRR, no murmurs appreciated, no extra heart sounds  Pulmonary/Chest: CTAB, symmetric chest rise, no wheezing or crackles  - while in room patient taken off of nasal cannula and remained with O2 at 100% saturation  GI: Soft, tender to deep palpation, distended, normoactive bowel sounds  Extremities: no edema, clubbing, or cyanosis  Skin: dry, warm, " intact. No bruising or rashes.  Neuro: Alert and oriented    Laboratory:  CBC:   Lab Results   Component Value Date    WBC 28.41 (H) 07/19/2019    HGB 8.6 (L) 07/19/2019     07/19/2019    MCV 92 07/19/2019    RDW 15.9 (H) 07/19/2019       Estimated Creatinine Clearance: 22.1 mL/min (A) (based on SCr of 2.8 mg/dL (H)).    Microbiology Data:  Blood culture (7/11) - negative  Blood culture (7/19) - pending  Respiratory culture - pending  C Diff - negative    Radiology:  CT Abdomen/Pelvis (7/18)  The liver, spleen, gallbladder appear normal.  There is a small amount of perihepatic fluid.  The pancreas and adrenal glands are unremarkable.  There is no hydronephrosis.  Multiple cysts are present in the left kidney.  There is an approximately 15 x 4 cm hematoma within the lesser sac.  Within this hematoma there are areas of increased attenuation suggesting active extravasation.  There is moderate fat stranding and hemorrhage in the region of the hepatic flexure and thickening of the right lateral conal fascia.  No bowel obstruction.  No free fluid.    CT Chest (7/18)  There is no CT evidence of pulmonary thromboembolism.  No enlarged hilar or mediastinal lymph nodes are seen.  No suspicious pulmonary nodules or masses are noted.  There is an area of pneumonia in the left upper lobe and mild dependent atelectasis in both lower lobes.  There is a trace left pleural fluid collection.    Assessment     Jose Martin Hutchins is a 78 y.o. male with multiple comorbidities including chronic inflammatory demyelinating polyneuritis. He was transferred to [facility] for higher level of care and need for interventional radiology for a retroperitoneal hematoma.    Patient acutely ill due to combination of retroperitoneal hematoma as well as upper lobe pneumonia found on CT chest. White count spike to 30 within the past 2 days and 2 episodes of hypotension. Will treat pneumonia and continue to look for any other sources of  infection.     Recommendations     Left Upper Lobe Pneumonia  - Continue Vancomycin with dosage guidance from clinical pharmacy  - Continue Zosyn 4.5g IV q8h  - Will follow sputum and blood culture speciation/sensitivities    Thank you for the consult. Please call with any questions.    Emma Jean Baptiste MD  [facility] Internal Medicine Resident, -I

## 2019-07-19 NOTE — SUBJECTIVE & OBJECTIVE
Past Medical History:   Diagnosis Date    A-fib     Coronary artery disease     GERD (gastroesophageal reflux disease)     Hypertension     Kidney function abnormal     Rheumatoid arthritis        Past Surgical History:   Procedure Laterality Date    APPENDECTOMY      CORONARY ARTERY BYPASS GRAFT (CABG)      3 vessel    HERNIA REPAIR      INSERTION, CATHETER, VASCULAR, DUAL LUMEN N/A 7/10/2019    Performed by [facility]illa [facility] at [facility]    MULTIPLE TOOTH EXTRACTIONS  2019    3 teeth    TONSILLECTOMY         Review of patient's allergies indicates:   Allergen Reactions    Sulfa (sulfonamide antibiotics) Hives    Ramucirumab Palpitations       Current Facility-Administered Medications on File Prior to Encounter   Medication    [COMPLETED] heparin (porcine) injection 5,000 Units    [COMPLETED] iopamidol (ISOVUE-370) injection 100 mL    [COMPLETED] phytonadione vitamin k (AQUA-MEPHYTON) 10 mg in dextrose 5 % 50 mL IVPB    [COMPLETED] sodium chloride 0.9% bolus 500 mL    [DISCONTINUED] 0.9%  NaCl infusion (for blood administration)    [DISCONTINUED] 0.9%  NaCl infusion (for blood administration)    [DISCONTINUED] 0.9%  NaCl infusion (for blood administration)    [DISCONTINUED] acetaminophen tablet 650 mg    [DISCONTINUED] albumin human 5% bottle 3,500 mL    [DISCONTINUED] aspirin chewable tablet 81 mg    [DISCONTINUED] calcium gluconate 100 mg/mL (10%) injection 4 g    [DISCONTINUED] dextrose 50% injection 12.5 g    [DISCONTINUED] dextrose 50% injection 25 g    [DISCONTINUED] dicyclomine capsule 10 mg    [DISCONTINUED] diphenoxylate-atropine 2.5-0.025 mg per tablet 1 tablet    [DISCONTINUED] DULoxetine DR capsule 30 mg    [DISCONTINUED] fluconazole tablet 100 mg    [DISCONTINUED] folic acid tablet 1 mg    [DISCONTINUED] gabapentin capsule 600 mg    [DISCONTINUED] glucagon (human recombinant) injection 1 mg    [DISCONTINUED] glucose chewable tablet 16 g    [DISCONTINUED] glucose  chewable tablet 24 g    [DISCONTINUED] hydroCHLOROthiazide tablet 25 mg    [DISCONTINUED] insulin aspart U-100 injection 1-10 Units    [DISCONTINUED] insulin aspart U-100 injection 5 Units    [DISCONTINUED] insulin detemir U-100 injection 12 Units    [DISCONTINUED] Lactobacillus acidoph-L.bulgar 1 million cell tablet 1 tablet    [DISCONTINUED] magic mouthwash (diphenhydrAMINE 12.5 mg/5 mL 20 mL, aluminum & magnesium hydroxide-simethicone (MYLANTA) 20 mL, lidocaine HCl 2% (XYLOCAINE) 20 mL) solution    [DISCONTINUED] metoprolol succinate (TOPROL-XL) 24 hr tablet 25 mg    [DISCONTINUED] morphine injection 2 mg    [DISCONTINUED] nitroGLYCERIN SL tablet 0.4 mg    [DISCONTINUED] norepinephrine 4 mg in dextrose 5 % 250 mL infusion    [DISCONTINUED] nystatin 100,000 unit/mL suspension 500,000 Units    [DISCONTINUED] omega-3 acid ethyl esters capsule 2 g    [DISCONTINUED] oxybutynin tablet 5 mg    [DISCONTINUED] pantoprazole EC tablet 40 mg    [DISCONTINUED] piperacillin-tazobactam 3.375 g in dextrose 5 % 50 mL IVPB (ready to mix system)    [DISCONTINUED] polyethylene glycol packet 17 g    [DISCONTINUED] predniSONE tablet 20 mg    [DISCONTINUED] sodium chloride 0.9% flush 10 mL    [DISCONTINUED] sodium citrate 4 % (3 mL) Syrg 3 mL    [DISCONTINUED] tamsulosin 24 hr capsule 0.4 mg    [DISCONTINUED] vitamin D 1000 units tablet 1,000 Units     Current Outpatient Medications on File Prior to Encounter   Medication Sig    aspirin (ECOTRIN) 81 MG EC tablet Take 81 mg by mouth once daily.    dextrose 5 % SolP 250 mL with norepinephrine 1 mg/mL Soln 4 mg Inject 1.698 mcg/min into the vein continuous.    dicyclomine (BENTYL) 10 MG capsule Take 10 mg by mouth 3 (three) times daily as needed.    diphenoxylate-atropine 2.5-0.025 mg (LOMOTIL) 2.5-0.025 mg per tablet Take 1 tablet by mouth 4 (four) times daily as needed for Diarrhea.    DULoxetine (CYMBALTA) 30 MG capsule Take 30 mg by mouth once daily.     fluconazole (DIFLUCAN) 100 MG tablet Take 1 tablet (100 mg total) by mouth once daily. for 6 days    folic acid (FOLVITE) 1 MG tablet Take 1 mg by mouth once daily.    gabapentin (NEURONTIN) 600 MG tablet Take 600 mg by mouth 3 (three) times daily.    hydroCHLOROthiazide (HYDRODIURIL) 25 MG tablet Take 1 tablet (25 mg total) by mouth once daily. for 6 days    Lactobacillus rhamnosus GG (CULTURELLE) 10 billion cell capsule Take 1 capsule by mouth once daily.    metoprolol succinate (TOPROL-XL) 25 MG 24 hr tablet Take 1 tablet (25 mg total) by mouth once daily.    nitroGLYCERIN (NITROSTAT) 0.4 MG SL tablet Place 0.4 mg under the tongue every 5 (five) minutes as needed for Chest pain.    omega-3 acid ethyl esters (LOVAZA) 1 gram capsule Take 2 g by mouth 2 (two) times daily.    oxybutynin (DITROPAN) 5 MG Tab Take 5 mg by mouth 2 (two) times daily.    pantoprazole (PROTONIX) 40 MG tablet Take 1 tablet (40 mg total) by mouth once daily.    piperacillin sodium/tazobactam (PIPERACILLIN-TAZOBACTAM 3.375G/50ML D5W, READY TO MIX,) Inject 50 mLs (3.375 g total) into the vein every 8 (eight) hours.    tamsulosin (FLOMAX) 0.4 mg Cap Take 0.4 mg by mouth once daily.    vitamin D (VITAMIN D3) 1000 units Tab Take 1,000 Units by mouth once daily.     Family History     Problem Relation (Age of Onset)    No Known Problems Mother, Father        Tobacco Use    Smoking status: Never Smoker    Smokeless tobacco: Never Used   Substance and Sexual Activity    Alcohol use: No     Frequency: Never    Drug use: No    Sexual activity: Never     Review of Systems   Constitutional: Positive for fatigue.   HENT: Negative.    Eyes: Negative.    Respiratory: Negative.    Cardiovascular: Negative.    Gastrointestinal: Positive for abdominal distention and abdominal pain. Negative for nausea and vomiting.   Genitourinary: Negative.    Musculoskeletal: Negative.    Skin: Positive for pallor.   Neurological: Positive for tremors and  weakness.     Objective:     Vital Signs (Most Recent):  Temp: 98 °F (36.7 °C) (07/19/19 0820)  Pulse: 98 (07/19/19 1100)  Resp: (!) 28 (07/19/19 1100)  BP: 110/68 (07/19/19 1100)  SpO2: 100 % (07/19/19 1100) Vital Signs (24h Range):  Temp:  [96.9 °F (36.1 °C)-98.6 °F (37 °C)] 98 °F (36.7 °C)  Pulse:  [] 98  Resp:  [19-41] 28  SpO2:  [87 %-100 %] 100 %  BP: ()/(47-76) 110/68     Weight: 77.1 kg (170 lb)  Body mass index is 25.1 kg/m².    Physical Exam   Constitutional: He appears well-developed and well-nourished. He appears lethargic. No distress.   HENT:   Head: Normocephalic and atraumatic.   Nose: Nose normal.   Mouth/Throat: Mucous membranes are dry. Oral lesions present.   Dry lips with brown lesions in his tongue.    Eyes: Conjunctivae are normal. Right eye exhibits no discharge. Left eye exhibits no discharge.   Neck: Normal range of motion. Neck supple. No JVD present. No tracheal deviation present.   Cardiovascular: Normal rate, regular rhythm, normal heart sounds and intact distal pulses. Exam reveals no gallop and no friction rub.   No murmur heard.  Pulmonary/Chest: Effort normal and breath sounds normal. No stridor. No respiratory distress. He has no wheezes. He has no rales. He exhibits no tenderness.   Abdominal: Soft. Bowel sounds are normal. He exhibits distension. He exhibits no mass. There is tenderness in the right upper quadrant, right lower quadrant and epigastric area. There is no guarding.   Musculoskeletal: He exhibits no edema, tenderness or deformity.   Neurological: He appears lethargic. He displays tremor.   Pt was unable to perform neuro exam as he was not following the direction. He has no strength on his LE bilaterally.     Skin: Skin is warm and dry. No rash noted. He is not diaphoretic. No erythema. There is pallor.           Significant Labs:   CBC:   Recent Labs   Lab 07/18/19  1309  07/19/19  0100 07/19/19  0435 07/19/19  1010   WBC 26.60*  --   --  37.60* 28.41*    HGB 10.4*   < > 9.3* 9.4* 8.6*   HCT 31.7*   < > 29.9* 29.3* 26.0*   *  --   --  323 304    < > = values in this interval not displayed.     CMP:   Recent Labs   Lab 07/18/19  1309 07/19/19  0435 07/19/19  1010   * 130* 128*   K 4.2 5.1 5.3*   CL 92* 91* 89*   CO2 27 25 24   * 203* 269*   BUN 37* 48* 54*   CREATININE 1.20 2.00* 2.8*   CALCIUM 8.4 8.4 8.8   PROT 5.1* 5.7* 5.2*   ALBUMIN 3.3* 3.5 3.5   BILITOT 0.8 1.4* 0.8   ALKPHOS 29* 48 58   AST 63* 53 56*   ALT 61* 63* 42   ANIONGAP  --   --  15   EGFRNONAA 58* 31* 21*     Lactic Acid:   Recent Labs   Lab 07/19/19  1010   LACTATE 2.2     Magnesium:   Recent Labs   Lab 07/18/19  1309 07/19/19  1010   MG 2.2 2.1     TSH:   Recent Labs   Lab 07/19/19  1010   TSH 0.611       Significant Imaging: I have reviewed all pertinent imaging results/findings within the past 24 hours.

## 2019-07-19 NOTE — CONSULTS
Patient ID: Jose Martin Hutchins is a 78 y.o. male.    Chief Complaint: No chief complaint on file.      HPI:  HPI  78M transferred here overnight from [facility] for epigastric pain for about 24 hours. CT there indicated fluid collection consistent with blood and contrast blush in the lesser sac. Never had this pain before. He was in the hospital getting plasmaphoresis for his neuropathy. Hx of appendectomy and RIHR years ago. Also CABG years ago. No NV. Got several units of pRBCs. Now on levo. Minimal urine output. Has HD access via left subclavian plasmaphoresis catheter. Transferred here for IR evaluation. No fevers. Never had EGD.    Review of Systems   Constitutional: Negative for chills, diaphoresis and fever.   HENT: Negative for trouble swallowing.    Respiratory: Negative for cough, shortness of breath, wheezing and stridor.    Cardiovascular: Negative for chest pain and palpitations.   Gastrointestinal: Positive for abdominal pain. Negative for abdominal distention, blood in stool, diarrhea, nausea and vomiting.   Endocrine: Negative for cold intolerance and heat intolerance.   Genitourinary: Positive for decreased urine volume.   Musculoskeletal: Negative for back pain.   Skin: Negative for rash.   Allergic/Immunologic: Negative for immunocompromised state.   Neurological: Negative for dizziness, syncope and numbness.   Hematological: Negative for adenopathy.   Psychiatric/Behavioral: Negative for agitation.       Current Facility-Administered Medications   Medication Dose Route Frequency Provider Last Rate Last Dose    acetaminophen tablet 650 mg  650 mg Oral Q8H PRN Tamara Campos MD        aspirin chewable tablet 81 mg  81 mg Oral Daily Tamara Campos MD        dextrose 50 % in water (D50W) injection 12.5 g  12.5 g Intravenous PRN Tamara Campos MD        dextrose 50 % in water (D50W) injection 25 g  25 g Intravenous PRN Tamara Campos MD        DULoxetine DR capsule 30 mg  30 mg Oral  Daily Tamara Campos MD        folic acid tablet 1 mg  1 mg Oral Daily Tamara Campos MD        glucagon (human recombinant) injection 1 mg  1 mg Intramuscular PRN Tamara Campos MD        glucose chewable tablet 16 g  16 g Oral PRN Tamara Campos MD        glucose chewable tablet 24 g  24 g Oral PRN Tamara Campos MD        hydroCHLOROthiazide tablet 25 mg  25 mg Oral Daily Tamara Campos MD        insulin aspart U-100 pen 1-10 Units  1-10 Units Subcutaneous QID (AC + HS) PRN Tamara Campos MD        insulin detemir U-100 pen 12 Units  12 Units Subcutaneous QHS Tamara Campos MD        Lactobacillus acidoph-L.bulgar 1 million cell tablet 1 tablet  1 tablet Oral TID  Tamara Campos MD        magic mouthwash (diphenhydrAMINE 12.5 mg/5 mL 20 mL, aluminum & magnesium hydroxide-simethicone (MYLANTA) 20 mL, lidocaine HCl 2% (XYLOCAINE) 20 mL) solution  15 mL Swish & Spit Q4H PRN Tamara Campos MD        metoprolol succinate (TOPROL-XL) 24 hr tablet 25 mg  25 mg Oral Daily Tamara Campos MD        nitroGLYCERIN SL tablet 0.4 mg  0.4 mg Sublingual Q5 Min PRN Tamara Campos MD        norepinephrine bitartrate-D5W 16 mg/250 mL (64 mcg/mL) infusion  0.02 mcg/kg/min Intravenous Continuous Tamara Campos MD 4.3 mL/hr at 07/19/19 1101 0.06 mcg/kg/min at 07/19/19 1101    nystatin 100,000 unit/mL suspension 500,000 Units  500,000 Units Oral TID  Tamara Campos MD        omega-3 fatty acids-fish oil 340-1,000 mg capsule 1 capsule  1 capsule Oral Daily Jr Spivey MD        oxybutynin tablet 5 mg  5 mg Oral BID Tamara Campos MD        pantoprazole EC tablet 40 mg  40 mg Oral Daily Tamara Campos MD        piperacillin-tazobactam 4.5 g in dextrose 5 % 100 mL IVPB (ready to mix system)  4.5 g Intravenous Q8H Al Boone III, MD 25 mL/hr at 07/19/19 1107 4.5 g at 07/19/19 1107    polyethylene glycol packet 17 g  17 g Oral Daily Tamara Campos MD        predniSONE tablet 20 mg  20 mg Oral Daily Tamara Campos MD        sodium  chloride 0.9% flush 10 mL  10 mL Intravenous PRN Tamara Campos MD        sodium chloride 0.9% flush 10 mL  10 mL Intravenous PRN Tamara Campos MD        tamsulosin 24 hr capsule 0.4 mg  0.4 mg Oral Daily Tamara Campos MD        vancomycin in dextrose 5 % 1 gram/250 mL IVPB 1,000 mg  1,000 mg Intravenous Q24H Al Boone III, MD   1,000 mg at 07/19/19 1107    vitamin D 1000 units tablet 1,000 Units  1,000 Units Oral Daily Tamara Campos MD           Review of patient's allergies indicates:   Allergen Reactions    Sulfa (sulfonamide antibiotics) Hives    Ramucirumab Palpitations       Past Medical History:   Diagnosis Date    A-fib     Coronary artery disease     GERD (gastroesophageal reflux disease)     Hypertension     Kidney function abnormal     Rheumatoid arthritis        Past Surgical History:   Procedure Laterality Date    APPENDECTOMY      CORONARY ARTERY BYPASS GRAFT (CABG)      3 vessel    HERNIA REPAIR      INSERTION, CATHETER, VASCULAR, DUAL LUMEN N/A 7/10/2019    [facility]    MULTIPLE TOOTH EXTRACTIONS  2019    3 teeth    TONSILLECTOMY         Family History   Problem Relation Age of Onset    No Known Problems Mother     No Known Problems Father        Social History     Socioeconomic History    Marital status:      Spouse name: Not on file    Number of children: Not on file    Years of education: Not on file    Highest education level: Not on file   Occupational History    Not on file   Social Needs    Financial resource strain: Not on file    Food insecurity:     Worry: Not on file     Inability: Not on file    Transportation needs:     Medical: Not on file     Non-medical: Not on file   Tobacco Use    Smoking status: Never Smoker    Smokeless tobacco: Never Used   Substance and Sexual Activity    Alcohol use: No     Frequency: Never    Drug use: No    Sexual activity: Never   Lifestyle    Physical activity:     Days per week:  Not on file     Minutes per session: Not on file    Stress: Not on file   Relationships    Social connections:     Talks on phone: Not on file     Gets together: Not on file     Attends Christian service: Not on file     Active member of club or organization: Not on file     Attends meetings of clubs or organizations: Not on file     Relationship status: Not on file   Other Topics Concern    Not on file   Social History Narrative    Not on file       Vitals:    07/19/19 1100   BP: 110/68   Pulse: 98   Resp: (!) 28   Temp:        Physical Exam   Constitutional: He is oriented to person, place, and time. He appears well-nourished. No distress.   HENT:   Head: Normocephalic and atraumatic.   Eyes: No scleral icterus.   Cardiovascular: Normal rate.   Pulmonary/Chest: Effort normal. No stridor. No respiratory distress.   Abdominal: Soft. He exhibits distension. There is tenderness.   Ab diffusely TTP, worse upper abdomen.    Lymphadenopathy:     He has no cervical adenopathy.   Neurological: He is alert and oriented to person, place, and time.   Skin: Skin is warm. No erythema.   Psychiatric: He has a normal mood and affect. His behavior is normal.     WBC 28  Hg 8.6 from 9.4 this am  Plts 304  INR 1.3  K 5.1  CT shows hemorrhage in lesser sac. Pancreas looks normal. No free air.     Assessment & Plan:   78M with intra-abdominal bleed  No known hx of PUD, never had EGD  Will have nurse place NG tube  Awaiting IR eval. Possible emoblization.  If unable to find anything and remains unstable may need surgery.

## 2019-07-19 NOTE — CONSULTS
Consult acknowledged. Imaging and labs reviewed and case discussed with Dr. Boone. IR asked about angiography/embolization as well as drainage of hematoma. Intra-abdominal hematoma with contrast extrav on resent CT AP. Scan was portal venous phase only, so cannot determine if this is arterial or venous. Spontaneous hemorrhage tends to be venous and his HH appears stable this am, so low suspicious for active arterial hemorrhage requiring angiography and embolization. He is hypotensive but also has leukocytosis, and I suspect he may be septic. Drainage of hematomas is typically not required and may not be successful given that fresh hematomas tend to be too viscous for evacuation though a catheter.    No indication for IR intervention at this time. Dr. Boone in agreement. Thank you for the opportunity to assist in the care of this patient. Please do not hesitate to contact IR with questions or concerns.      [facility]  Pager [phone]

## 2019-07-19 NOTE — ASSESSMENT & PLAN NOTE
Pt transferred from [facility] with the concern for intraperitoneal hemorrhage for IR consult.   CT abdomen: Acute approximately 15 x 4 cm hematoma within the region of the lesser sac with signs of active contrast extravasation.  2. Small amount of perihepatic fluid.  CTA abdomen: Left upper lobe pneumonia. Trace left pleural fluid collection. Mild dependent atelectasis bilaterally.  IR consulted: will appreciate recs.   GS consulted: will appreciate recs.   Will trend H/H q6hs.

## 2019-07-19 NOTE — ASSESSMENT & PLAN NOTE
BUN/Cr upon admission: 54/2.8. Baseline 35/1.2.   Likely 2/2 medications vs intravascular volume depletion.   Will start IVF and continue to monitor.   Avoid nephrotoxic agents.

## 2019-07-19 NOTE — PLAN OF CARE
Pt transferred from [facility] for IR procedure.    TN met with pt and spouse. Mrs. Hutchins informed TN d/c plan was for pt to go to [facility] ([phone]/ FAX: [phone]) once medically stable.   TN spoke to Niharika Albert , Inpatient Coordinator ( [phone]) , at [facility] who confirmed pt is on their list for admit once stable and they have Epic access to follow patient's progress. Facility does not accept patients over the weekend. Tn sent facesheet via [employer] and Niharika informed Tn no other documents need to be sent via [employer] since they have Epic access.    TN gave pt/spouse d/c brochure and card and informed spouse that TN/SW will send orders and arrange transportation (will need ambulance) to [facility] once medically stable.    TN encouraged spouse to call for any needs/concerns.       07/19/19 1136   Discharge Assessment   Assessment Type Discharge Planning Assessment   Confirmed/corrected address and phone number on facesheet? Yes   Assessment information obtained from? Caregiver;Patient  (Mary  (wife)  )   Expected Length of Stay (days) 2   Communicated expected length of stay with patient/caregiver yes   Prior to hospitilization cognitive status: Alert/Oriented   Prior to hospitalization functional status: Partially Dependent;Wheelchair Bound;Needs Assistance   Current cognitive status: Alert/Oriented   Current Functional Status: Partially Dependent;Needs Assistance   Facility Arrived From: [facility]   Lives With spouse   Able to Return to Prior Arrangements yes   Is patient able to care for self after discharge? No   Who are your caregiver(s) and their phone number(s)? Mary  (wife)  [phone]   Patient's perception of discharge disposition rehab facility   Readmission Within the Last 30 Days no previous admission in last 30 days   Patient currently being followed by outpatient case management? No   Patient currently receives any other outside agency  services? No   Equipment Currently Used at Home wheelchair;bedside commode   Do you have any problems affording any of your prescribed medications? No   Is the patient taking medications as prescribed? yes   Does the patient have transportation home? Yes   Transportation Anticipated other (see comments)  (ambulance)   Does the patient receive services at the [facility]? No   Discharge Plan A Rehab   DME Needed Upon Discharge  none   Patient/Family in Agreement with Plan yes

## 2019-07-19 NOTE — PT/OT/SLP PROGRESS
Physical Therapy  Eval Attempt    Patient Name:  Jose Martin Hutchins   MRN:  [id number]    Patient not seen today secondary to pt lethargic, not following commands, and awaiting IR procedure. Will follow-up tomorrow's date.    Edda Cruz, PT   7/19/2019

## 2019-07-19 NOTE — ASSESSMENT & PLAN NOTE
Seizure like activity noticed, likely 2/2 hypotensive episodes.  EEG ordered.   Neuro consulted  Neuro checks q4h, will do stat CT with worsening mental status or decreased LOC.   Speech eval for swallowing.   2 mg Ativan IV for generalized seizures lasting > 5 min  Delirium and seizure precautions.

## 2019-07-20 LAB
ALBUMIN SERPL BCP-MCNC: 3.1 G/DL (ref 3.5–5.2)
ALP SERPL-CCNC: 59 U/L (ref 55–135)
ALT SERPL W/O P-5'-P-CCNC: 37 U/L (ref 10–44)
ANION GAP SERPL CALC-SCNC: 12 MMOL/L (ref 8–16)
ANION GAP SERPL CALC-SCNC: 12 MMOL/L (ref 8–16)
ANISOCYTOSIS BLD QL SMEAR: SLIGHT
APTT BLDCRRT: 38.9 SEC (ref 21–32)
APTT BLDCRRT: 38.9 SEC (ref 21–32)
AST SERPL-CCNC: 46 U/L (ref 10–40)
BASOPHILS # BLD AUTO: ABNORMAL K/UL (ref 0–0.2)
BASOPHILS NFR BLD: 0 % (ref 0–1.9)
BILIRUB SERPL-MCNC: 0.6 MG/DL (ref 0.1–1)
BLD PROD TYP BPU: NORMAL
BLD PROD TYP BPU: NORMAL
BLOOD UNIT EXPIRATION DATE: NORMAL
BLOOD UNIT EXPIRATION DATE: NORMAL
BLOOD UNIT TYPE CODE: 5100
BLOOD UNIT TYPE CODE: 5100
BLOOD UNIT TYPE: NORMAL
BLOOD UNIT TYPE: NORMAL
BUN SERPL-MCNC: 61 MG/DL (ref 8–23)
BUN SERPL-MCNC: 62 MG/DL (ref 8–23)
CALCIUM SERPL-MCNC: 8 MG/DL (ref 8.7–10.5)
CALCIUM SERPL-MCNC: 8.2 MG/DL (ref 8.7–10.5)
CHLORIDE SERPL-SCNC: 92 MMOL/L (ref 95–110)
CHLORIDE SERPL-SCNC: 92 MMOL/L (ref 95–110)
CHLORIDE UR-SCNC: <20 MMOL/L (ref 25–200)
CO2 SERPL-SCNC: 24 MMOL/L (ref 23–29)
CO2 SERPL-SCNC: 26 MMOL/L (ref 23–29)
CODING SYSTEM: NORMAL
CODING SYSTEM: NORMAL
CREAT SERPL-MCNC: 3.3 MG/DL (ref 0.5–1.4)
CREAT SERPL-MCNC: 3.4 MG/DL (ref 0.5–1.4)
CREAT UR-MCNC: 86.6 MG/DL (ref 23–375)
DIFFERENTIAL METHOD: ABNORMAL
DISPENSE STATUS: NORMAL
DISPENSE STATUS: NORMAL
EOSINOPHIL # BLD AUTO: ABNORMAL K/UL (ref 0–0.5)
EOSINOPHIL NFR BLD: 0 % (ref 0–8)
EOSINOPHIL URNS QL WRIGHT STN: NORMAL
ERYTHROCYTE [DISTWIDTH] IN BLOOD BY AUTOMATED COUNT: 16.2 % (ref 11.5–14.5)
EST. GFR  (AFRICAN AMERICAN): 19 ML/MIN/1.73 M^2
EST. GFR  (AFRICAN AMERICAN): 20 ML/MIN/1.73 M^2
EST. GFR  (NON AFRICAN AMERICAN): 16 ML/MIN/1.73 M^2
EST. GFR  (NON AFRICAN AMERICAN): 17 ML/MIN/1.73 M^2
FIBRINOGEN PPP-MCNC: 261 MG/DL (ref 182–366)
GLUCOSE SERPL-MCNC: 171 MG/DL (ref 70–110)
GLUCOSE SERPL-MCNC: 202 MG/DL (ref 70–110)
HCT VFR BLD AUTO: 20.7 % (ref 40–54)
HCT VFR BLD AUTO: 20.7 % (ref 40–54)
HCT VFR BLD AUTO: 21.9 % (ref 40–54)
HCT VFR BLD AUTO: 24.1 % (ref 40–54)
HGB BLD-MCNC: 6.8 G/DL (ref 14–18)
HGB BLD-MCNC: 6.8 G/DL (ref 14–18)
HGB BLD-MCNC: 7.2 G/DL (ref 14–18)
HGB BLD-MCNC: 7.9 G/DL (ref 14–18)
HYPOCHROMIA BLD QL SMEAR: ABNORMAL
INR PPP: 1.1 (ref 0.8–1.2)
LYMPHOCYTES # BLD AUTO: ABNORMAL K/UL (ref 1–4.8)
LYMPHOCYTES NFR BLD: 1 % (ref 18–48)
MAGNESIUM SERPL-MCNC: 2 MG/DL (ref 1.6–2.6)
MCH RBC QN AUTO: 30.4 PG (ref 27–31)
MCHC RBC AUTO-ENTMCNC: 32.9 G/DL (ref 32–36)
MCV RBC AUTO: 92 FL (ref 82–98)
METAMYELOCYTES NFR BLD MANUAL: 1 %
MONOCYTES # BLD AUTO: ABNORMAL K/UL (ref 0.3–1)
MONOCYTES NFR BLD: 1 % (ref 4–15)
MYELOCYTES NFR BLD MANUAL: 1 %
NEUTROPHILS NFR BLD: 91 % (ref 38–73)
NEUTS BAND NFR BLD MANUAL: 5 %
NUM UNITS TRANS PACKED RBC: NORMAL
OVALOCYTES BLD QL SMEAR: ABNORMAL
PHOSPHATE SERPL-MCNC: 5.5 MG/DL (ref 2.7–4.5)
PLATELET # BLD AUTO: 218 K/UL (ref 150–350)
PLATELET BLD QL SMEAR: ABNORMAL
PMV BLD AUTO: 9 FL (ref 9.2–12.9)
POCT GLUCOSE: 162 MG/DL (ref 70–110)
POCT GLUCOSE: 171 MG/DL (ref 70–110)
POCT GLUCOSE: 212 MG/DL (ref 70–110)
POIKILOCYTOSIS BLD QL SMEAR: SLIGHT
POTASSIUM SERPL-SCNC: 4.9 MMOL/L (ref 3.5–5.1)
POTASSIUM SERPL-SCNC: 4.9 MMOL/L (ref 3.5–5.1)
PROT SERPL-MCNC: 4.9 G/DL (ref 6–8.4)
PROTHROMBIN TIME: 11.2 SEC (ref 9–12.5)
RBC # BLD AUTO: 2.24 M/UL (ref 4.6–6.2)
SODIUM SERPL-SCNC: 128 MMOL/L (ref 136–145)
SODIUM SERPL-SCNC: 130 MMOL/L (ref 136–145)
SODIUM UR-SCNC: 23 MMOL/L (ref 20–250)
TOXIC GRANULES BLD QL SMEAR: PRESENT
TRANS ERYTHROCYTES VOL PATIENT: NORMAL ML
UUN UR-MCNC: 260 MG/DL (ref 140–1050)
WBC # BLD AUTO: 25.27 K/UL (ref 3.9–12.7)

## 2019-07-20 PROCEDURE — 63600175 PHARM REV CODE 636 W HCPCS: Performed by: STUDENT IN AN ORGANIZED HEALTH CARE EDUCATION/TRAINING PROGRAM

## 2019-07-20 PROCEDURE — S0028 INJECTION, FAMOTIDINE, 20 MG: HCPCS | Performed by: FAMILY MEDICINE

## 2019-07-20 PROCEDURE — 85610 PROTHROMBIN TIME: CPT

## 2019-07-20 PROCEDURE — 82436 ASSAY OF URINE CHLORIDE: CPT

## 2019-07-20 PROCEDURE — 80053 COMPREHEN METABOLIC PANEL: CPT

## 2019-07-20 PROCEDURE — 80048 BASIC METABOLIC PNL TOTAL CA: CPT

## 2019-07-20 PROCEDURE — 97163 PT EVAL HIGH COMPLEX 45 MIN: CPT

## 2019-07-20 PROCEDURE — 20000000 HC ICU ROOM

## 2019-07-20 PROCEDURE — 27000221 HC OXYGEN, UP TO 24 HOURS

## 2019-07-20 PROCEDURE — 36430 TRANSFUSION BLD/BLD COMPNT: CPT

## 2019-07-20 PROCEDURE — 84540 ASSAY OF URINE/UREA-N: CPT

## 2019-07-20 PROCEDURE — 85027 COMPLETE CBC AUTOMATED: CPT

## 2019-07-20 PROCEDURE — 63600175 PHARM REV CODE 636 W HCPCS: Performed by: INTERNAL MEDICINE

## 2019-07-20 PROCEDURE — 85384 FIBRINOGEN ACTIVITY: CPT

## 2019-07-20 PROCEDURE — P9021 RED BLOOD CELLS UNIT: HCPCS

## 2019-07-20 PROCEDURE — 94761 N-INVAS EAR/PLS OXIMETRY MLT: CPT

## 2019-07-20 PROCEDURE — 25000003 PHARM REV CODE 250: Performed by: FAMILY MEDICINE

## 2019-07-20 PROCEDURE — 85014 HEMATOCRIT: CPT

## 2019-07-20 PROCEDURE — 97535 SELF CARE MNGMENT TRAINING: CPT

## 2019-07-20 PROCEDURE — 85007 BL SMEAR W/DIFF WBC COUNT: CPT

## 2019-07-20 PROCEDURE — 85730 THROMBOPLASTIN TIME PARTIAL: CPT

## 2019-07-20 PROCEDURE — 63600175 PHARM REV CODE 636 W HCPCS: Performed by: FAMILY MEDICINE

## 2019-07-20 PROCEDURE — 97166 OT EVAL MOD COMPLEX 45 MIN: CPT

## 2019-07-20 PROCEDURE — S0030 INJECTION, METRONIDAZOLE: HCPCS | Performed by: INTERNAL MEDICINE

## 2019-07-20 PROCEDURE — 99232 PR SUBSEQUENT HOSPITAL CARE,LEVL II: ICD-10-PCS | Mod: ,,, | Performed by: SURGERY

## 2019-07-20 PROCEDURE — 97530 THERAPEUTIC ACTIVITIES: CPT

## 2019-07-20 PROCEDURE — 92610 EVALUATE SWALLOWING FUNCTION: CPT

## 2019-07-20 PROCEDURE — 83735 ASSAY OF MAGNESIUM: CPT

## 2019-07-20 PROCEDURE — 84100 ASSAY OF PHOSPHORUS: CPT

## 2019-07-20 PROCEDURE — 25000003 PHARM REV CODE 250: Performed by: INTERNAL MEDICINE

## 2019-07-20 PROCEDURE — 82570 ASSAY OF URINE CREATININE: CPT

## 2019-07-20 PROCEDURE — 84300 ASSAY OF URINE SODIUM: CPT

## 2019-07-20 PROCEDURE — 85018 HEMOGLOBIN: CPT

## 2019-07-20 PROCEDURE — P9016 RBC LEUKOCYTES REDUCED: HCPCS

## 2019-07-20 PROCEDURE — 87205 SMEAR GRAM STAIN: CPT

## 2019-07-20 PROCEDURE — 99232 SBSQ HOSP IP/OBS MODERATE 35: CPT | Mod: ,,, | Performed by: SURGERY

## 2019-07-20 PROCEDURE — 87449 NOS EACH ORGANISM AG IA: CPT

## 2019-07-20 RX ORDER — FUROSEMIDE 10 MG/ML
40 INJECTION INTRAMUSCULAR; INTRAVENOUS ONCE
Status: DISCONTINUED | OUTPATIENT
Start: 2019-07-20 | End: 2019-07-20

## 2019-07-20 RX ORDER — INSULIN ASPART 100 [IU]/ML
0-5 INJECTION, SOLUTION INTRAVENOUS; SUBCUTANEOUS
Status: DISCONTINUED | OUTPATIENT
Start: 2019-07-21 | End: 2019-07-29 | Stop reason: HOSPADM

## 2019-07-20 RX ORDER — FUROSEMIDE 10 MG/ML
20 INJECTION INTRAMUSCULAR; INTRAVENOUS ONCE
Status: DISCONTINUED | OUTPATIENT
Start: 2019-07-20 | End: 2019-07-20

## 2019-07-20 RX ORDER — HYDROCODONE BITARTRATE AND ACETAMINOPHEN 500; 5 MG/1; MG/1
TABLET ORAL
Status: DISCONTINUED | OUTPATIENT
Start: 2019-07-20 | End: 2019-07-29 | Stop reason: HOSPADM

## 2019-07-20 RX ORDER — CEFEPIME HYDROCHLORIDE 2 G/50ML
2 INJECTION, SOLUTION INTRAVENOUS EVERY 24 HOURS
Status: DISCONTINUED | OUTPATIENT
Start: 2019-07-20 | End: 2019-07-20

## 2019-07-20 RX ORDER — FUROSEMIDE 10 MG/ML
40 INJECTION INTRAMUSCULAR; INTRAVENOUS 2 TIMES DAILY
Status: DISCONTINUED | OUTPATIENT
Start: 2019-07-20 | End: 2019-07-21

## 2019-07-20 RX ORDER — INSULIN ASPART 100 [IU]/ML
0-5 INJECTION, SOLUTION INTRAVENOUS; SUBCUTANEOUS EVERY 6 HOURS PRN
Status: DISCONTINUED | OUTPATIENT
Start: 2019-07-20 | End: 2019-07-20

## 2019-07-20 RX ORDER — FUROSEMIDE 10 MG/ML
40 INJECTION INTRAMUSCULAR; INTRAVENOUS ONCE
Status: COMPLETED | OUTPATIENT
Start: 2019-07-20 | End: 2019-07-20

## 2019-07-20 RX ORDER — INSULIN ASPART 100 [IU]/ML
1-10 INJECTION, SOLUTION INTRAVENOUS; SUBCUTANEOUS EVERY 6 HOURS
Status: DISCONTINUED | OUTPATIENT
Start: 2019-07-20 | End: 2019-07-20

## 2019-07-20 RX ORDER — METRONIDAZOLE 500 MG/100ML
500 INJECTION, SOLUTION INTRAVENOUS
Status: DISCONTINUED | OUTPATIENT
Start: 2019-07-20 | End: 2019-07-27

## 2019-07-20 RX ADMIN — FOLIC ACID 1 MG: 1 TABLET ORAL at 08:07

## 2019-07-20 RX ADMIN — LACTOBACILLUS TAB 1 TABLET: TAB at 05:07

## 2019-07-20 RX ADMIN — DULOXETINE 30 MG: 30 CAPSULE, DELAYED RELEASE ORAL at 08:07

## 2019-07-20 RX ADMIN — POLYETHYLENE GLYCOL 3350 17 G: 17 POWDER, FOR SOLUTION ORAL at 09:07

## 2019-07-20 RX ADMIN — NYSTATIN 500000 UNITS: 100000 SUSPENSION ORAL at 08:07

## 2019-07-20 RX ADMIN — TAMSULOSIN HYDROCHLORIDE 0.4 MG: 0.4 CAPSULE ORAL at 08:07

## 2019-07-20 RX ADMIN — ACETAMINOPHEN 650 MG: 325 TABLET ORAL at 09:07

## 2019-07-20 RX ADMIN — FUROSEMIDE 40 MG: 10 INJECTION, SOLUTION INTRAMUSCULAR; INTRAVENOUS at 10:07

## 2019-07-20 RX ADMIN — NYSTATIN 500000 UNITS: 100000 SUSPENSION ORAL at 11:07

## 2019-07-20 RX ADMIN — CEFEPIME 2 G: 2 INJECTION, POWDER, FOR SOLUTION INTRAVENOUS at 11:07

## 2019-07-20 RX ADMIN — LIDOCAINE HYDROCHLORIDE 15 ML: 20 SOLUTION ORAL; TOPICAL at 09:07

## 2019-07-20 RX ADMIN — FUROSEMIDE 40 MG: 10 INJECTION, SOLUTION INTRAVENOUS at 09:07

## 2019-07-20 RX ADMIN — PIPERACILLIN AND TAZOBACTAM 4.5 G: 4; .5 INJECTION, POWDER, LYOPHILIZED, FOR SOLUTION INTRAVENOUS; PARENTERAL at 04:07

## 2019-07-20 RX ADMIN — INSULIN ASPART 4 UNITS: 100 INJECTION, SOLUTION INTRAVENOUS; SUBCUTANEOUS at 11:07

## 2019-07-20 RX ADMIN — VANCOMYCIN HYDROCHLORIDE 1000 MG: 1 INJECTION, POWDER, LYOPHILIZED, FOR SOLUTION INTRAVENOUS at 10:07

## 2019-07-20 RX ADMIN — DOXYCYCLINE 100 MG: 100 INJECTION, POWDER, LYOPHILIZED, FOR SOLUTION INTRAVENOUS at 04:07

## 2019-07-20 RX ADMIN — FAMOTIDINE 20 MG: 10 INJECTION, SOLUTION INTRAVENOUS at 08:07

## 2019-07-20 RX ADMIN — INSULIN ASPART 2 UNITS: 100 INJECTION, SOLUTION INTRAVENOUS; SUBCUTANEOUS at 05:07

## 2019-07-20 RX ADMIN — NYSTATIN 500000 UNITS: 100000 SUSPENSION ORAL at 05:07

## 2019-07-20 RX ADMIN — METRONIDAZOLE 500 MG: 500 INJECTION, SOLUTION INTRAVENOUS at 06:07

## 2019-07-20 RX ADMIN — LACTOBACILLUS TAB 1 TABLET: TAB at 08:07

## 2019-07-20 RX ADMIN — METRONIDAZOLE 500 MG: 500 INJECTION, SOLUTION INTRAVENOUS at 11:07

## 2019-07-20 RX ADMIN — INSULIN DETEMIR 12 UNITS: 100 INJECTION, SOLUTION SUBCUTANEOUS at 09:07

## 2019-07-20 RX ADMIN — PREDNISONE 15 MG: 10 TABLET ORAL at 08:07

## 2019-07-20 NOTE — PLAN OF CARE
Problem: Adult Inpatient Plan of Care  Goal: Plan of Care Review  Outcome: Ongoing (interventions implemented as appropriate)  Patient transported to CT [facility] for CT of abdomen pelvis w/o contrast. See results. Patient slightly less lethargic throughout night and still disoriented to time and situation. Right side abdomen/flank  to palpation. H/H monitored q4 as ordered. Turned q2 hours as ordered. Levo titrating down and at minimal dose. Urine output still minimal about 5-10cc/hr. Morning labs processing. May transfuse with Hgb <7. Will continue to monitor.

## 2019-07-20 NOTE — PROGRESS NOTES
Progress Note  [facility]  Admit Date: 7/19/2019   LOS: 1 day   SUBJECTIVE:   Patient seen and examined this AM. Patient was AAOx2 this AM. H/H continued to trend down below 7 (6.8) from >8.  Spoke to his wife and she feels that he is more alert this am. She signed the consent for him to recent his transfusion. Remained on pressors overnight. Tmax: 98.3. ID recs to continue Vanco/Zosyn. Repeat CT did not appear to significantly change. Patient's urine output decreased overnight (Approximately 5-10 mL/hr). In addition, Neuro recommended to change current dose of steroid from 20 to 15.     ROS  Neuro: Mental status improving.  Abd: Abd pain/distention.  Resp: Denies SOB  CV: Denies CP    OBJECTIVE:   Vital Signs (Most Recent)  Temp: 97.5 °F (36.4 °C) (07/20/19 1501)  Pulse: 80 (07/20/19 1615)  Resp: (!) 32 (07/20/19 1615)  BP: (!) 98/56 (07/20/19 1615)  SpO2: 96 % (07/20/19 1615)    I & O (Last 24H):    Intake/Output Summary (Last 24 hours) at 7/20/2019 1708  Last data filed at 7/20/2019 1500  Gross per 24 hour   Intake 2071.14 ml   Output 885 ml   Net 1186.14 ml     Wt Readings from Last 3 Encounters:   07/19/19 77.1 kg (170 lb)   07/18/19 77.3 kg (170 lb 6.7 oz)   07/19/19 77.1 kg (170 lb)       Current Diet Order   Procedures    Diet clear liquid [facility]; Consistent Carbohydrate; Thin     Order Specific Question:   Indicate patient location for additional diet options:     Answer:   [facility]     Order Specific Question:   Additional Diet Options:     Answer:   Consistent Carbohydrate     Order Specific Question:   Fluid consistency:     Answer:   Thin        Physical Exam  Constitutional: He is AAOx2. L subclavian line in place. Esparza in place this am.  Cardiovascular: Elevated HR.   Pulmonary/Chest: no acute respiratory distress. Rales appreciated at the bibasilar bases.  Abdominal: Soft. Bowel sounds are present. He exhibits rebound/guarding. No peritoneal signs. +distention and TTP  around the umbilicus.  Musculoskeletal:  He exhibits no edema.   Nursing note and vitals reviewed.      Laboratory Data:  CBC  Recent Labs   Lab 07/19/19  0435 07/19/19  1010  07/19/19  2359 07/20/19  0416 07/20/19  1141   WBC 37.60* 28.41*  --   --  25.27*  --    RBC 3.10* 2.83*  --   --  2.24*  --    HGB 9.4* 8.6*   < > 7.2* 6.8*  6.8* 7.9*   HCT 29.3* 26.0*   < > 21.9* 20.7*  20.7* 24.1*    304  --   --  218  --    MCV 95 92  --   --  92  --    MCH 30.3 30.4  --   --  30.4  --    MCHC 32.0 33.1  --   --  32.9  --     < > = values in this interval not displayed.     CMP  Recent Labs   Lab 07/19/19  0435 07/19/19  1010 07/20/19  0416 07/20/19  1141   CALCIUM 8.4 8.8 8.2* 8.0*   PROT 5.7* 5.2* 4.9*  --    * 128* 130* 128*   K 5.1 5.3* 4.9 4.9   CO2 25 24 26 24   CL 91* 89* 92* 92*   BUN 48* 54* 61* 62*   CREATININE 2.00* 2.8* 3.4* 3.3*   ALKPHOS 48 58 59  --    ALT 63* 42 37  --    AST 53 56* 46*  --    BILITOT 1.4* 0.8 0.6  --      POCT-Glucose  POCT Glucose   Date Value Ref Range Status   07/20/2019 171 (H) 70 - 110 mg/dL Final   07/20/2019 212 (H) 70 - 110 mg/dL Final   07/19/2019 252 (H) 70 - 110 mg/dL Final   07/19/2019 302 (H) 70 - 110 mg/dL Final     COAGS  Recent Labs   Lab 07/16/19  2359 07/18/19  1309 07/19/19  0622 07/20/19  0416   INR 1.4* 1.7* 1.3* 1.1   APTT 37.4*  --  34.6 38.9*  38.9*     UA  No results for input(s): COLORU, CLARITYU, SPECGRAV, PHUR, PROTEINUA, GLUCOSEU, BLOODU, WBCU, RBCU, BACTERIA, MUCUS in the last 24 hours.    Invalid input(s):  BILIRUBINCON  MICRO  Microbiology Results (last 7 days)     Procedure Component Value Units Date/Time    Blood culture [[id number]] Collected:  07/19/19 1349    Order Status:  Completed Specimen:  Blood Updated:  07/20/19 0115     Blood Culture, Routine No Growth to date    Narrative:       Collection has been rescheduled by ESS at 07/19/2019 12:08 Reason:   Unable to collect  07/19/2019 13:49 left hand  Collection has been rescheduled by  ESS at 07/19/2019 12:08 Reason:   Unable to collect  07/19/2019 13:49 left hand    Blood culture [[id number]] Collected:  07/19/19 1403    Order Status:  Completed Specimen:  Blood Updated:  07/20/19 0115     Blood Culture, Routine No Growth to date    Narrative:       Collection has been rescheduled by ESS at 07/19/2019 12:08 Reason:   Unable to collect  Collection has been rescheduled by ESS at 07/19/2019 12:08 Reason:   Unable to collect    Culture, Respiratory with Gram Stain [[id number]]     Order Status:  No result Specimen:  Respiratory from Sputum, Expectorated     Gram stain [[id number]]     Order Status:  No result Specimen:  Sputum         LIPID PANEL  No results found for: CHOL  No results found for: HDL  No results found for: LDLCALC  No results found for: TRIG  No results found for: CHOLHDL    Diagnostic Results:  Imaging in last 24 hours personally reviewed.     ASSESSMENT/PLAN:   Jose Martin Hutchins is a 78 y.o. male with evidence of an intraperitoneal hemorrhage, ANDIE most likely secondary to abx, and PNA.     Intraperitoneal hemorrhage  Based on the CT presentation and per IR may represent venous instead of arterial  No acute intervention recommended by IR  Dr. Silver saw the patient and pending clinically course may continue non-operative intervention if at all possible. However, if patient decompensates and becomes unstable then he will perform ex lap in order to try to localize the source  Continue to trend H/H.  Patient is s/p 1 unit prbcs and has 3 on hold.   Patient has h/o CAD (Hgb>8)  Continue close monitoring and supportive care    ANDIE on CKD  Most likely secondary to abx  Avoid renal toxic meds  Keep Mg 2, K 4  Continue Strict I/O with becerril cath in place  Reassessing  volume status regularly  Oxygen PRN to keep O2 > 88%  Echo (7/12/19): EF 55% w/ grade 1 diastolic dysfunction  Urine output decreased overnight. Pt was receiving IV fluids but discontinued.   Likely will try Lasix  challenge later today.     Hypotension   Continue to wean pressors as tolerated.  May be secondary to PNA.  Continue Abx.   Cxs NGTD.    Left Upper Lobe PNA  Continue Vanco/Zosyn  F/u Blood and Sputum Cxs.   F/u ID recommendations  Oxygen PRN to keep O2 > 88%  CT w/ evidence of bilateral pleural effusions in addition to PNA.     DM II  Maintain glucose 140-180  Continue SSI  HgbA1C: 7.4 (7/19)  Continue POCT checks     Essential HTN  Holding home medical management in light of hypotension and currently on pressors  Current BP this am: 102/61  Low-sodium cardiac diet  Continue to monitor    CIDP  Per Neurology, continue steroid at 15mg.   In addition, per Neurology may consider IVIG while inpatient once clinically stable as well as patient will likely benefit from subcutaneous IG as outpatient for continued treatment of CIDP once acute illness resolves    DVT ppx: Holding Heparin   GI ppx: Famotidine  Code: Full    Dispo:  Continue to wean patient off of pressors, Lasix challenge, and trend H/H. Continue close monitoring of renal function and supportive care.    Case discussed with  attending physician.     Manan Bacon Jr., D.O.  [facility], -2  [facility]

## 2019-07-20 NOTE — PT/OT/SLP EVAL
Physical Therapy Evaluation    Patient Name:  Jose Martin Hutchins   MRN:  [id number]    Recommendations:     Discharge Recommendations:  rehabilitation facility   Discharge Equipment Recommendations: wheelchair, power, hospital bed, lift device   Barriers to discharge: Decreased caregiver support    Assessment:     Jose Martin Hutchins is a 78 y.o. male admitted with a medical diagnosis of Intraperitoneal hemorrhage.  He presents with the following impairments/functional limitations:  impaired functional mobilty, weakness, impaired cognition, decreased safety awareness, gait instability, impaired endurance, decreased coordination, impaired coordination, impaired cardiopulmonary response to activity, impaired balance, decreased upper extremity function, abnormal tone, impaired skin, impaired self care skills, decreased lower extremity function, decreased ROM, impaired joint extensibility, impaired fine motor.  Despite patient's co-morbidities, including weakness; impaired coordination and balance, patient was living in community setting functioning at SBA level for ADLs, and mobility prior to this event up to 6 weeks a go, since then he has had a sharp decline in funciton.  There is an expectation of returning to prior level of function to maintain independence as tolerated thus reducing burden of care in caregiver.  Patient's clinical condition meets full Inpatient Rehab (IPR) criteria; including the ability to actively participate in 3 hours of therapy.  A lower level of care(SNF) cannot provide the interdisciplinary treatment approach needed.       Rehab Prognosis: Good; patient would benefit from acute skilled PT services to address these deficits and reach maximum level of function.    Recent Surgery: * No surgery found *      Plan:     During this hospitalization, patient to be seen 6 x/week to address the identified rehab impairments via gait training, therapeutic activities, therapeutic exercises, neuromuscular  "re-education and progress toward the following goals:    · Plan of Care Expires:  07/20/19    Subjective     Chief Complaint: pain in abdomen  Patient/Family Comments/goals: Pt's wife r/o she wants physical therapy for pt  Pain/Comfort:  · Pain Rating 1: 5/10  · Location 1: abdomen  · Pain Addressed 1: Pre-medicate for activity, Nurse notified, Reposition, Distraction  · Pain Rating Post-Intervention 1: (did not rate)    Patients cultural, spiritual, Yazdanism conflicts given the current situation: no    Living Environment:  Pt lives c/ his wife in 1SH, WIS c/ bench chair.  Prior to admission, patients level of function: Pt 's wife r/o pt was able to ambulate at [facility] c/ RW 6 weeks ago post IPR stay. She r/o pt did not have any therapy for 14 days and experienced a sharp decline to function which was worsened by his "sickness:"; since then pt has been total A for ADLs including dressing; transfer from bed to WC. Son assists in shower transfer and pt is set-up independence c/ feeding and is able to push his transport chair to one room at a time using his legs  Equipment used at home: bedside commode(transport chair; shower bench).  DME owned (not currently used): wheelchair, hospital bed and julio lift.  Upon discharge, patient will have assistance from family.    Objective:     Communicated with RNApryl prior to session.  Patient found supine with SCD, telemetry, PureWick, bed alarm, blood pressure cuff, peripheral IV  upon PT entry to room.    General Precautions: Standard, fall   Orthopedic Precautions:N/A   Braces: N/A     Exams:  · Cognitive Exam:  Patient is oriented to Person, Place, Time, Situation and pt drowsy, seepy, responds eye opening to VC, needs mod<>max VC to open eyes  · Gross Motor Coordination:  significantly impaired  · Tone- Hypertonia c/ mild catch-and-release spasticity in mid and end range of hip ext; knee ext and adductors bilaterally  · Postural Exam:  Patient presented with the following " abnormalities sitting EOB:    · -       Posterior pelvic tilt  · -       Abnormal trunk flexion  · -       severe post lean requiring max A<>total A for trunk postural control  · RLE ROM: WFL  · RLE Strength: 2-/5 grossly  · LLE ROM: WFL  · LLE Strength: 2-/5 grossly    Functional Mobility:  · Bed Mobility:     · Rolling Left:  moderate assistance and c/ HR and VC; extra time  · Scooting: total assistance and of 2 persons c/ draw sheet transfer to Head of Bed for postioning at end of session.  · Supine to Sit: maximal assistance and of 2 persons; VC for hand placement to reach for HR; extra time; VC to keep eyes open during task  · Sit to Supine: total assistance and of 2 persons  · Transfers:     · Sit to Stand:  Not appropriate at this time d/t weakness and activity intolerance  · Balance: Static sitting EOB: Poor; severe post lean requiring max A<>total A for trunk postural control      Therapeutic Activities and Exercises:  PT eval completed c/ progressive mobility as detailed above; pt sat EOB <1min; r/o dizzness, therefore unable to take vitals before returning back to bed. Pt was then postioned properly on bed.   PT/wife educated on PT POC and HEP of activity In bed as tolerated.    AM-PAC 6 CLICK MOBILITY  Total Score:10     Patient left supine with all lines intact, call button in reach, RN notified and wife present.    GOALS:   Multidisciplinary Problems     Physical Therapy Goals        Problem: Physical Therapy Goal    Goal Priority Disciplines Outcome Goal Variances Interventions   Physical Therapy Goal     PT, PT/OT Ongoing (interventions implemented as appropriate)     Description:  Goals to be met by: 2019     Patient will increase functional independence with mobility by performin. Supine to sit with MInimal Assistance  2. Rolling to Left and Right with Minimal Assistance.  3. Sit to stand transfer with Moderate Assistance  3. Bed to chair transfer with Moderate Assistance using Rolling  Walker  4. Sitting at edge of bed x10 minutes with Minimal Assistance  3. Lower extremity exercise program x12 reps per handout, with assistance as needed                      History:     Past Medical History:   Diagnosis Date    A-fib     Coronary artery disease     GERD (gastroesophageal reflux disease)     Hypertension     Kidney function abnormal     Rheumatoid arthritis        Past Surgical History:   Procedure Laterality Date    APPENDECTOMY      CORONARY ARTERY BYPASS GRAFT (CABG)      3 vessel    HERNIA REPAIR      INSERTION, CATHETER, VASCULAR, DUAL LUMEN N/A 7/10/2019    Performed by Jovan [facility]illa [facility] at [facility] END[facility]    MULTIPLE TOOTH EXTRACTIONS  2019    3 teeth    TONSILLECTOMY         Time Tracking:     PT Received On: 07/20/19  PT Start Time: 1135     PT Stop Time: 1207  PT Total Time (min): 32 min     Billable Minutes: Evaluation 15 and Therapeutic Activity 17      Sumeet Hernandez, PT  07/20/2019

## 2019-07-20 NOTE — PROGRESS NOTES
"NEUROLOGY Progress Note    Reason for consult:  Seizure like event    HPI:   Jose Martin Hutchins is a 78 y.o. 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 Admitted originally for elective plasmapheresis. Family reports significant improvement following initial 2 plasma exchange treatments, before he decompensated again due to internal bleeding +/- underlying infection. Upon arrival to unit patient was noted to have gaze deviation and was briefly unresponsive when transferred from stretcher to bed, his SBP was in the 70s at the time, neurology consulted for possible seizure activity. EEG performed 7/19/19 suggestive of moderate diffuse or multifocal cerebral dysfunction but no epileptiform activity seen.     Interval Hx:  Upon entering the room pt sleeping comfortably. Family (wife, daughter, sisters) at bedside. Easily arouses. Pt states he is doing "okay" this morning. Answered all orientation questions with some errors but self-corrected. Reporting burning sensation in his mouth and abdominal cramps. Per family, he was able to sleep well. Family (and night nurse) denies any episode of LOC, worsening mental status, tonic -clonic movements of extremities or any abnormal movements, other than intermittent tremors in bilateral hands that have been present for years. Per family pt still no at his cognitive baseline, but much improved from admission.  Still BP in low side, intermittently on minimal pressor overnight. Sat 97% on NC        Objective:     Vital Signs:   Vitals:    07/20/19 0900   BP: (!) 92/52   Pulse: 97   Resp: (!) 28   Temp:       Neurological Exam (limited due to patient participation)    Older man, resting in bed, " eyes open to voice, good visual tracking, following commands beside requires repeated prompting to participate in exam.  Oriented to person, situation, family, place and year. States 2018, then self corrected.  EOMI, PERRL, visual fields full, facial sensation intact V I-III, no facial asymmetry. Able to open mouth, cracked and dry tongue, reduced protrusion and lateral movements.  Motor: 4/5 biceps, 3/5 triceps, hand  bilaterally  Unable to resist gravity in the lower extremities  2/5 knee flexion, 3/5 knee extension  Decreased sensation to light touch distally x 4. Decreased vibration distally in 4 extremities  Absent reflexes, no clonus, mute plantar response  Slight dysarthria.  Intermittent tremor in bilateral upper extremities R>L, mostly when arms not completely relaxed. Tremor absent at rest.     LABORATORY STUDIES:  Recent Results (from the past 24 hour(s))   Blood culture    Collection Time: 07/19/19  1:49 PM   Result Value Ref Range    Blood Culture, Routine No Growth to date    Hemoglobin    Collection Time: 07/19/19  1:49 PM   Result Value Ref Range    Hemoglobin 8.4 (L) 14.0 - 18.0 g/dL   Hematocrit    Collection Time: 07/19/19  1:49 PM   Result Value Ref Range    Hematocrit 25.3 (L) 40.0 - 54.0 %   Blood culture    Collection Time: 07/19/19  2:03 PM   Result Value Ref Range    Blood Culture, Routine No Growth to date    Prepare RBC 2 Units; low h/h    Collection Time: 07/19/19  3:06 PM   Result Value Ref Range    UNIT NUMBER [id number]     Product Code [id number]     DISPENSE STATUS ISSUED     CODING SYSTEM [id number]     Unit Blood Type Code 5100     Unit Blood Type O POS     Unit Expiration [id number]    POCT glucose    Collection Time: 07/19/19  3:50 PM   Result Value Ref Range    POCT Glucose 302 (H) 70 - 110 mg/dL   Urinalysis, Reflex to Urine Culture Urine, Clean Catch    Collection Time: 07/19/19  3:52 PM   Result Value Ref Range    Specimen UA Urine, Catheterized     Color, UA Yellow  Yellow, Straw, Layne    Appearance, UA Clear Clear    pH, UA 5.0 5.0 - 8.0    Specific Gravity, UA 1.020 1.005 - 1.030    Protein, UA 1+ (A) Negative    Glucose, UA Negative Negative    Ketones, UA Trace (A) Negative    Bilirubin (UA) Negative Negative    Occult Blood UA 1+ (A) Negative    Nitrite, UA Negative Negative    Urobilinogen, UA Negative <2.0 EU/dL    Leukocytes, UA Negative Negative   Urinalysis Microscopic    Collection Time: 07/19/19  3:52 PM   Result Value Ref Range    RBC, UA 5 (H) 0 - 4 /hpf    WBC, UA 3 0 - 5 /hpf    Bacteria Moderate (A) None-Occ /hpf    Hyaline Casts, UA 0 0-1/lpf /lpf    Microscopic Comment SEE COMMENT    Hemoglobin    Collection Time: 07/19/19  4:12 PM   Result Value Ref Range    Hemoglobin 7.8 (L) 14.0 - 18.0 g/dL   Hematocrit    Collection Time: 07/19/19  4:12 PM   Result Value Ref Range    Hematocrit 23.8 (L) 40.0 - 54.0 %   Hemoglobin A1c    Collection Time: 07/19/19  4:12 PM   Result Value Ref Range    Hemoglobin A1C 7.4 (H) 4.0 - 5.6 %    Estimated Avg Glucose 166 (H) 68 - 131 mg/dL   Type & Screen    Collection Time: 07/19/19  4:18 PM   Result Value Ref Range    Group & Rh O POS     Indirect Víctor NEG    Hemoglobin    Collection Time: 07/19/19  7:45 PM   Result Value Ref Range    Hemoglobin 7.6 (L) 14.0 - 18.0 g/dL   Hematocrit    Collection Time: 07/19/19  7:45 PM   Result Value Ref Range    Hematocrit 22.8 (L) 40.0 - 54.0 %   POCT glucose    Collection Time: 07/19/19  9:44 PM   Result Value Ref Range    POCT Glucose 252 (H) 70 - 110 mg/dL   Hemoglobin    Collection Time: 07/19/19 11:59 PM   Result Value Ref Range    Hemoglobin 7.2 (L) 14.0 - 18.0 g/dL   Hematocrit    Collection Time: 07/19/19 11:59 PM   Result Value Ref Range    Hematocrit 21.9 (L) 40.0 - 54.0 %   Fibrinogen    Collection Time: 07/20/19  4:16 AM   Result Value Ref Range    Fibrinogen 261 182 - 366 mg/dL   APTT    Collection Time: 07/20/19  4:16 AM   Result Value Ref Range    aPTT 38.9 (H) 21.0 - 32.0  sec   Comprehensive Metabolic Panel (CMP)    Collection Time: 07/20/19  4:16 AM   Result Value Ref Range    Sodium 130 (L) 136 - 145 mmol/L    Potassium 4.9 3.5 - 5.1 mmol/L    Chloride 92 (L) 95 - 110 mmol/L    CO2 26 23 - 29 mmol/L    Glucose 171 (H) 70 - 110 mg/dL    BUN, Bld 61 (H) 8 - 23 mg/dL    Creatinine 3.4 (H) 0.5 - 1.4 mg/dL    Calcium 8.2 (L) 8.7 - 10.5 mg/dL    Total Protein 4.9 (L) 6.0 - 8.4 g/dL    Albumin 3.1 (L) 3.5 - 5.2 g/dL    Total Bilirubin 0.6 0.1 - 1.0 mg/dL    Alkaline Phosphatase 59 55 - 135 U/L    AST 46 (H) 10 - 40 U/L    ALT 37 10 - 44 U/L    Anion Gap 12 8 - 16 mmol/L    eGFR if African American 19 (A) >60 mL/min/1.73 m^2    eGFR if non African American 16 (A) >60 mL/min/1.73 m^2   Magnesium    Collection Time: 07/20/19  4:16 AM   Result Value Ref Range    Magnesium 2.0 1.6 - 2.6 mg/dL   Phosphorus    Collection Time: 07/20/19  4:16 AM   Result Value Ref Range    Phosphorus 5.5 (H) 2.7 - 4.5 mg/dL   CBC with Automated Differential    Collection Time: 07/20/19  4:16 AM   Result Value Ref Range    WBC 25.27 (H) 3.90 - 12.70 K/uL    RBC 2.24 (L) 4.60 - 6.20 M/uL    Hemoglobin 6.8 (L) 14.0 - 18.0 g/dL    Hematocrit 20.7 (L) 40.0 - 54.0 %    Mean Corpuscular Volume 92 82 - 98 fL    Mean Corpuscular Hemoglobin 30.4 27.0 - 31.0 pg    Mean Corpuscular Hemoglobin Conc 32.9 32.0 - 36.0 g/dL    RDW 16.2 (H) 11.5 - 14.5 %    Platelets 218 150 - 350 K/uL    MPV 9.0 (L) 9.2 - 12.9 fL    Lymph # CANCELED 1.0 - 4.8 K/uL    Mono # CANCELED 0.3 - 1.0 K/uL    Eos # CANCELED 0.0 - 0.5 K/uL    Baso # CANCELED 0.00 - 0.20 K/uL    Gran% 91.0 (H) 38.0 - 73.0 %    Lymph% 1.0 (L) 18.0 - 48.0 %    Mono% 1.0 (L) 4.0 - 15.0 %    Eosinophil% 0.0 0.0 - 8.0 %    Basophil% 0.0 0.0 - 1.9 %    Bands 5.0 %    Metamyelocytes 1.0 %    Myelocytes 1.0 %    Platelet Estimate Appears normal     Aniso Slight     Poik Slight     Hypo Occasional     Ovalocytes Occasional     Toxic Granulation Present     Differential Method Manual     Hemoglobin    Collection Time: 07/20/19  4:16 AM   Result Value Ref Range    Hemoglobin 6.8 (L) 14.0 - 18.0 g/dL   Hematocrit    Collection Time: 07/20/19  4:16 AM   Result Value Ref Range    Hematocrit 20.7 (L) 40.0 - 54.0 %   PT/INR    Collection Time: 07/20/19  4:16 AM   Result Value Ref Range    Prothrombin Time 11.2 9.0 - 12.5 sec    INR 1.1 0.8 - 1.2   PTT    Collection Time: 07/20/19  4:16 AM   Result Value Ref Range    aPTT 38.9 (H) 21.0 - 32.0 sec   Sodium, urine, random    Collection Time: 07/20/19  9:03 AM   Result Value Ref Range    Sodium Urine Random 23 20 - 250 mmol/L   Creatinine, urine, random    Collection Time: 07/20/19  9:03 AM   Result Value Ref Range    Creatinine, Random Ur 86.6 23.0 - 375.0 mg/dL   Chloride, urine, random    Collection Time: 07/20/19  9:03 AM   Result Value Ref Range    Chloride, Rand Ur <20 (L) 25 - 200 mmol/L   Urea nitrogen, urine    Collection Time: 07/20/19  9:03 AM   Result Value Ref Range    Urine Urea Nitrogen, Random 260 140 - 1050 mg/dL   POCT glucose    Collection Time: 07/20/19 11:05 AM   Result Value Ref Range    POCT Glucose 212 (H) 70 - 110 mg/dL       Thyroid not examined  HgA1C%:  7.4  Vit B12: Not collected  Folate:  Not collected    RADIOLOGY STUDIES:  I have personally reviewed the images performed.     HEAD CT: no recent study    BRAIN MRI; chronic changes      A&P   77 y/o man w/ hx of HTN, p-afib, CAD, CIDP, recurrent infections transferred to [facility] for IR services on pressor support due to hemodynamic instability from acute blood loss after he was found to have retroperitoneal hemorrhage which may have been related to acute coagulapathy following plasma exchange. Noted to have episode of brief unresponsiveness associated with acute hypotension, neurology consulted due to concern for seizure activity. Episode likely related to hypotension. Initially encephalopathic, but oriented to self and situation, following requests.  EEG performed 7/19/19  suggestive of moderate diffuse or multifocal cerebral dysfunction but no epileptiform activity seen. Per family currently no at his cognitive baseline but much improved from admission. No new events of LOC. No clinical seizure-activity overnight.    Acute encephalopathy in setting of hypotension, sepsis, anemia, ANDIE. Transient unresponsiveness.  - acutely hypotensive during episode of transient unresponsiveness. Multiple factors for AMS includes sepsis, hypotension, ANDIE. Currently improving after antibiotics and more stable BPs., however still low Bps requiring intermittent pressor support.   - continue telemetry, q 4 h neurochecks, stat CT head w/ worsening mental status/decreased LOC  - SBP<180, MAPs> 70; pressors as needed  - BG<180 SSi as needed  - Recommend not starting any AEDs unless he has continued seizure activity while hemodynamically stable  - 2 mg Ativan IV for generalized seizures lasting > 5 min  - Spot EEG w/ diffuse slowing consistent with encephalopathy, no epileptiform activity reported   - metabolic abnormalities/empiric antibiotics per primary; avoid cefepime as this can worsen AMS  - delirium precautions, limit sedation  -ST consulted and assisting  - Consider repeating EEG when metabolic abnormalities/sepsis improve    CIDP  - Diagnosed by outpatient neurologist several months ago per wife  - no improvement to prednisone 60 mg qd previously, admitted for elective PE with significant response per family  - recommend withholding additional plasma exchange therapies given concern for iatrogenic coagulapathy  - continue steroids 15 mg qd  - can consider IVIG while inpatient once clinically stable  - patient will likely benefit from subcutaneous IG as outpatient for continued treatment of CIDP once outside of acute illness    Bilateral upper extremities tremors  -Asymmetric hands tremors R>L, mostly when arms not completely relaxed. Tremor absent at rest.   -Per pt and family has been present for  year.   -Seems like essential tremor type tremors. Never treated in the past.   -Defer initiation of any treatment at this time. Could be evaluated as outpatient.    Case seen and discussed with Dr. Clark , staff MD.    We will follow with you, please page Neurology resident on call with any further questions.    Fouzia Shannon MD  [facility]

## 2019-07-20 NOTE — PROGRESS NOTES
[facility]  PROGRESS NOTE    HPI: Jose Martin Hutchins is a 78 y.o. male 78M transferred here overnight from [facility] for epigastric pain for about 24 hours. CT there indicated fluid collection consistent with blood and contrast blush in the lesser sac. Never had this pain before. He was in the hospital getting plasmaphoresis for his neuropathy. Hx of appendectomy and RIHR years ago. Also CABG years ago. No NV. Got several units of pRBCs. Now on levo. Minimal urine output. Has HD access via left subclavian plasmaphoresis catheter. Transferred here for IR evaluation. No fevers. Never had EGD.    INTERVAL HISTORY:  Hemoglobin is slowly dropped from approximately 8.6 to 6.8.  Has received 1 unit.  Weaned off levo.  Minimal urine output with ANDIE.  Mental status has improved per family    IR consult and repeat CT scan was obtained to evaluate for progression of bleed.  I reviewed imaging and found that blush was seen on venous phase and not on arterial phase.  Given likely venous nature of this and no significant increase in size the deferred any intervention at this time.    VITALS:  Temp:  [97.6 °F (36.4 °C)-98.3 °F (36.8 °C)] 97.9 °F (36.6 °C)  Pulse:  [] 97  Resp:  [15-41] 28  SpO2:  [92 %-100 %] 96 %  BP: ()/(51-82) 92/52    I&Os:  I/O last 3 completed shifts:  In: 2230.2 [P.O.:100; I.V.:1580.2; IV Piggyback:550]  Out: 140 [Urine:140]    PHYSICAL EXAM:  GEN:  No acute distress, alert orient x3  HEENT:  Anicteric sclera  CV:  Mild tachycardia  RESP:  Nonlabored breathing  ABD:  Soft but diffusely tender, no rebound, no guarding, + distension  EXT:  No significant edema    LABS:  CBC:   Recent Labs   Lab 07/20/19  0416 07/20/19  1141   WBC 25.27*  --    RBC 2.24*  --    HGB 6.8*  6.8* 7.9*   HCT 20.7*  20.7* 24.1*     --    MCV 92  --    MCH 30.4  --    MCHC 32.9  --      CMP:   Recent Labs   Lab 07/20/19  0416 07/20/19  1141   * 202*   CALCIUM 8.2* 8.0*   ALBUMIN  3.1*  --    PROT 4.9*  --    * 128*   K 4.9 4.9   CO2 26 24   CL 92* 92*   BUN 61* 62*   CREATININE 3.4* 3.3*   ALKPHOS 59  --    ALT 37  --    AST 46*  --    BILITOT 0.6  --      Labs within the past 24 hours have been reviewed.      ASSESSMENT & PLAN:  78 y.o. male   Intraperitoneal hemorrhage  - no sudden drop in hemoglobin  - repeat CT shows relatively stable collection of blood  - IR recommend no acute interventions at this time  - will defer surgical intervention unless patient becomes unstable and unresponsive to fluid resuscitation/transfusion  - agree with transfusions and trending H&H, may need calcium and FFP if told transfusion requirements or greater than 4 PRBC units   - etiology bleeds uncertain at this time  - will continue to follow

## 2019-07-20 NOTE — NURSING
Spoke on phone with Family Med MD about patient's dropping H/H and if they wanted me to hold SQ heparin for tonight. Verbal order to hold SQ heparin and keep monitoring H/H q4 hours.

## 2019-07-20 NOTE — PLAN OF CARE
Problem: Adult Inpatient Plan of Care  Goal: Plan of Care Review  Outcome: Ongoing (interventions implemented as appropriate)     07/20/19 3121   Plan of Care Review   Plan of Care Reviewed With patient;spouse;daughter;family   Progress improving   Outcome Summary Alert this shift; 2 units PRBCs. PT/OT; Clear liquids    Safety: call light in reach, patient oriented to room & instructed how to notify nurse if assistance is needed, current questions/concerns addressed, bed in lowest position with wheels locked & side rails up X 3. Pt educated regarding fall prevention and taking appropriate action to prevent falls  Activity: As tolerated  Neurological: Oriented to self and place  Respiratory:  1-2 L O2 via NC  Cardiac: BP borderline normotensive, with hypotensive periods; HR stable. Afebrile this shift.   Intake/Output: adequate urine output after Lasix given, no bm today   Diet:  Clears  Pain:  With palpation to RU/RLQ of abdomen  Skin: Bruising, PU, skin tears (see flowsheet)  Devices: IV pump, O2, Esparza

## 2019-07-20 NOTE — PT/OT/SLP EVAL
Speech Language Pathology Evaluation  Bedside Swallow    Patient Name:  Jose Martin Hutchins   MRN:  [id number]  Admitting Diagnosis: Intraperitoneal hemorrhage    Recommendations:                 General Recommendations:  ongoing swallow assessment  Diet recommendations:  NPO, Clears, Thin   Aspiration Precautions: 1 bite/sip at a time, Alternating bites/sips, Assistance with meals, Avoid talking while eating, Check for pocketing/oral residue, Eliminate distractions, Feed only when awake/alert, Frequent oral care, HOB to 90 degrees, Meds whole 1 at a time, Monitor for s/s of aspiration, Remain upright 30 minutes post meal, Small bites/sips and Standard aspiration precautions   General Precautions: Standard, fall  Communication strategies:  provide increased time to answer    History:     Past Medical History:   Diagnosis Date    A-fib     Coronary artery disease     GERD (gastroesophageal reflux disease)     Hypertension     Kidney function abnormal     Rheumatoid arthritis        Past Surgical History:   Procedure Laterality Date    APPENDECTOMY      CORONARY ARTERY BYPASS GRAFT (CABG)      3 vessel    HERNIA REPAIR      INSERTION, CATHETER, VASCULAR, DUAL LUMEN N/A 7/10/2019    Performed by Jovan Montilla MD at [facility] include [facility] beds at the [facility] ENDO    MULTIPLE TOOTH EXTRACTIONS  2019    3 teeth    TONSILLECTOMY       HPI: 79 yo male with pmhx of HTN, CAD, HLD, CKD, GERD, RA, CIDP, Guillain Burre syndrome and A.fib was transferred from [facility] for IR to drain his intraperitoneal hemorrhage. He was admitted in [facility] 8 days ago. He was initially admitted for elective plasmapheresis since his CIDP was worsening. During his stay he became septic and was found to have UTI. He was treated for UTI. While he was in the general floor, he developed fever, hypotensive and became hemodynamically unstable with a drop in his h/h. He had a CT abdomen done which was positive for intraperitoneal hemorrhage that needed to be assessed by IR for possible  "draining and embolizing the source.   During his transfer to [facility], EMS noticed that the pt had few episodes where his eyes rolled back and his body was shaking. He was also noticed to have similar episode when he was first seen in the ICU. During those episodes his BP was noticeably low as well. Pt was awake upon verbal command. He knows his name but is not oriented to time. He denies any chest pain, sob, nausea, vomiting. He does complaint of pain in his right side of the abdomen.  He has elevated WBC, but no growth from cultures in the outside facility.     Social History: Patient lives with his wife at home.    Prior Intubation HX:  N/A    Modified Barium Swallow: None on file, per EMR.     CT Abdomen Pelvis without Contrast: 1.  Redemonstration of hyperattenuating collection within the anterior mid abdomen concerning for a hematoma, stable from prior examination.  There is additional hemoperitoneum present, minimally increased in volume from prior examination.    2.  Worsening atelectasis and/or consolidation involving the visualized left lower lobe and bilateral lower lobes.  Interval development of small bilateral pleural effusions.    3.  Persistent renal enhancement from prior contrast enhanced CT exam.  This may relate to underlying renal dysfunction or sepsis.  Clinical correlation advised.    4.  Additional incidental findings as above.    Prior diet: Per pt, "Cajun food, you know.. Gumbo, jambalaya."       Subjective     SLP consulted for clinical swallow eval. SLP confirmed with RN prior to entry.   Pt found awake and alert with pt's wife at bedside. Pt agreeable to participate in skilled ST session. Pt did report "burning mouth" sensation s/p swallow liquid trials, which pt and pt's wife state is d/t dental extraction sx that took place May 18, 2019-- Pt's wife reports dcr'd PO intake since then as pt "can't taste anything" and "his mouth burns" with PO intake. Pt's wife reported pt has lost " "~12 lbs since then.     Patient goals: "My mouth is burning" per pt      Pain/Comfort:  Pain Rating 1: (pt did not rate pain/cramping)  Location - Orientation 1: generalized  Location 1: abdomen  Pain Addressed 1: Nurse notified, Cessation of Activity    Objective:     Oral Musculature Evaluation  Oral Musculature: general weakness  Dentition: present and adequate  Mucosal Quality: cracked, dry  Mandibular Strength and Mobility: WFL  Oral Labial Strength and Mobility: WFL  Lingual Strength and Mobility: (reduced protrusion and lateral movement 2/2 pt c/o dry mouth)  Buccal Strength and Mobility: WFL  Volitional Swallow: (unable to assess 2/2 pt's c/o dry mouth)  Voice Prior to PO Intake: (pt observed with slightly mumbled speech prior to PO intake)    Bedside Swallow Eval: Pt seen this AM for clinical swallow eval. Pt with limited participation in PO trials 2/2 pt's c/o abdominal pain/cramping and "burning mouth" sensation. Pt tolerated thin liquids via cup sips and straw sips with no clinical s/s of aspiration. Pt deferred further PO trials 2/2 above mentioned pain.     Consistencies Assessed:  · Thin liquids --via cup sips x7 and straw sips x3 (PO meds administered with RN present in room)     Oral Phase:   · Limited assessment; however, pt with dcr'd oral acceptance 2/2 c/o "burning mouth" sensation. Pt did demonstrate timely AP transfer  and good oral clearance during liquid trials     Pharyngeal Phase:   - Limited assessment; however, pt with timely trigger of swallow with fair laryngeal elevation/ excursion   · Throat clearing -- s/p swallow cup sip with PO meds x1    Compensatory Strategies  · Volitional cough/throat clear-- s/p swallow cup sip with PO meds x1    Treatment: SLP will continue to follow for ongoing assessment of swallowing, x3/wk. Further goals pending.     Education: SLP educated pt and pt's wife on role of SLP, clinical swallow eval, diet recs/swallow precautions and POC. SLP answered all " "questions/concerns presented by pt and pt's wife, within SLP's scope of practice. SLP also informed pt and pt's wife on importance of participation in PO intake to ensure pt is meeting nutritional needs, d/t report of pt with dcr'd PO intake and weight loss since May 2019.  Pt and pt's wife acknowledged and confirmed understanding. Pt may need reinforcement of education provided.     Assessment:     Jose Martin Hutchins is a 78 y.o. male admitted with Intraperitoneal hemorrhage.  He presents with limited participation in clinical swallow assessment 2/2 c/o abdominal pain/cramping and "burning mouth" sensation, which impacted pt's overall participation. However, pt demonstrated no overt s/s of aspiration across given liquid trials.   SLP recs: Pending MD approval, Clear liquids PO diet with universal swallow precautions (upright at 90 degrees, alternate sips/bites, 1 bite/sip at a time, remain upright for 30 mins after meals, whole meds 1 by, etc.) SLP will continue to follow for ongoing assessment. SLP notified RN and MD Sousa of results/recs.     Goals:   Multidisciplinary Problems     SLP Goals        Problem: SLP Goal    Goal Priority Disciplines Outcome   SLP Goal     SLP Ongoing (interventions implemented as appropriate)   Description:  Short Term Goals:  1. Pt will participate in BSS to determine least restrictive diet.--ongoing  2. Pt will tolerate clear liquids PO diet with no overt s/s of aspiration.                      Plan:     · Patient to be seen:  3 x/week   · Plan of Care expires:  08/19/19  · Plan of Care reviewed with:  patient, spouse(OLIMPIA Montemayor and MD Sousa)   · SLP Follow-Up:  Yes       Discharge recommendations:  (TBD pending PT/OT recs)   Barriers to Discharge:  nutritional status    Time Tracking:     SLP Treatment Date:   07/20/19  Speech Start Time:  0830  Speech Stop Time:  0854     Speech Total Time (min):  24 min    Billable Minutes: Eval Swallow and Oral Function 12 and Seld Care/Home " Management Training 12    HANNAH Ordonez, CCC-SLP  07/20/2019

## 2019-07-20 NOTE — NURSING
Unable to transfuse ordered unit of PRBCs at this time. Per blood bank technician, patient does not meet criteria for transfusion, and they are at a critical shortage level of patient's blood type. [facility] Family [facility] number given to blood bank technician, so that she could speak to the physician regarding this order.

## 2019-07-20 NOTE — PLAN OF CARE
Problem: Physical Therapy Goal  Goal: Physical Therapy Goal  Goals to be met by: 2019     Patient will increase functional independence with mobility by performin. Supine to sit with MInimal Assistance  2. Rolling to Left and Right with Minimal Assistance.  3. Sit to stand transfer with Moderate Assistance  3. Bed to chair transfer with Moderate Assistance using Rolling Walker  4. Sitting at edge of bed x10 minutes with Minimal Assistance  3. Lower extremity exercise program x12 reps per handout, with assistance as needed    Outcome: Ongoing (interventions implemented as appropriate)  PT initial evaluation completed. Plan of care and goals established and discussed with patient/spouse.  Despite patient's co-morbidities, including weakness; impaired coordination and balance, patient was living in community setting functioning at SBA level for ADLs, and mobility prior to this event up to 6 weeks a go, since then he has had a sharp decline in funciton.  There is an expectation of returning to prior level of function to maintain independence as tolerated thus reducing burden of care in caregiver.  Patient's clinical condition meets full Inpatient Rehab (IPR) criteria; including the ability to actively participate in 3 hours of therapy.  A lower level of care(SNF) cannot provide the interdisciplinary treatment approach needed.       Discharge Recommendation: Inpatient rehab  DME Recommendation: Defer to SNF

## 2019-07-20 NOTE — PLAN OF CARE
"Problem: SLP Goal  Goal: SLP Goal  Short Term Goals:  1. Pt will participate in BSS to determine least restrictive diet.--ongoing  2. Pt will tolerate clear liquids PO diet with no overt s/s of aspiration.    Outcome: Ongoing (interventions implemented as appropriate)  7/20:  Pt seen this AM for clinical swallow eval. Pt with limited participation in PO trials 2/2 pt's c/o abdominal pain/cramping and "burning mouth" sensation. Pt tolerated thin liquids via cup sips and straw sips with no clinical s/s of aspiration. Pt deferred further PO trials 2/2 above mentioned pain. SLP recs: Pending MD approval, Clear liquids PO diet with universal swallow precautions (upright at 90 degrees, alternate sips/bites, 1 bite/sip at a time, remain upright for 30 mins after meals, whole meds 1 by, etc.) SLP will continue to follow for ongoing assessment. SLP notified RN and MD Sousa of results/recs.     JOSÉ LUIS Ordonez., CCC-SLP  Speech-Language Pathologist           "

## 2019-07-20 NOTE — PROGRESS NOTES
U Infectious Disease Consult     Primary Team: Family Medicine  Consultant Attending: Segundo  Date of Admit: 7/19/2019    History   Jose Martin Hutchins is a 78 y.o. male with a relevant history of dyslipidemia, CAD, HTN, mitral regurgitation, tricuspid regurgitation, chronic renal disease, GERD, rheumatoid arthritis, and chronic inflammatory demyelinating polyneuritis.    The patient presented to [facility] on 7/19/2019 as a direct admit to the ICU from [facility] for high level of care and interventional radiology services due to suspected retroperitoneal hematoma.    The patient was initially admitted at [facility] for elective plasmapheresis planned by an outside neurologist for his worsening CIPD. During admission, he had a fever of 102 at which time urine cultures, blood cultures, and urinalysis were done. Cultures remained negative but urinalysis was suspect for UTI which was treated with vancomycin and ceftriaxone for 7 days. Per family, they report he was improving after the two initial plasma exchange treatments but acutely decompensated and became hypotensive yesterday afternoon. At this time, hemoglobin dropped from 11 to 9 and CT chest and abdomen showed upper lobe pneumonia and large hematoma in the lesser sac.    Upon arrival to the [facility] ICU, he became unresponsive while being transferred to the bed with systolic BP in the 70's. He has remained afebrile.  Reason for consult, WBC increasing. Has an intraperitoneal hemorrhage. Decreased steroid dose but concern for other infectious process.    Interval     Mental status better, no increase in O2 requirements, not on pressors     Allergies:  Review of patient's allergies indicates:   Allergen Reactions    Sulfa (sulfonamide antibiotics) Hives    Ramucirumab Palpitations     Medications:   In-Hospital Scheduled Medications:   ceFEPime (MAXIPIME) IVPB  2 g Intravenous Daily    doxycycline (VIBRAMYCIN) IVPB  100 mg Intravenous Q12H     DULoxetine  30 mg Oral Daily    famotidine (PF)  20 mg Intravenous Daily    folic acid  1 mg Oral Daily    insulin aspart U-100  1-10 Units Subcutaneous Q6H    insulin detemir U-100  12 Units Subcutaneous QHS    Lactobacillus acidoph-L.bulgar  1 tablet Oral TID WM    metronidazole  500 mg Intravenous Q8H    nystatin  500,000 Units Oral TID WM    polyethylene glycol  17 g Oral Daily    predniSONE  15 mg Oral Daily    tamsulosin  0.4 mg Oral Daily    vancomycin (VANCOCIN) IVPB  1,000 mg Intravenous Q24H      In-Hospital PRN Medications:  sodium chloride, sodium chloride, acetaminophen, dextrose 50 % in water (D50W), dextrose 50 % in water (D50W), glucagon (human recombinant), glucose, glucose, glucose, glucose, insulin aspart U-100, magic mouthwash (diphenhydrAMINE 12.5 mg/5 mL 20 mL, aluminum & magnesium hydroxide-simethicone (MYLANTA) 20 mL, lidocaine HCl 2% (XYLOCAINE) 20 mL) solution, nitroGLYCERIN, sodium chloride 0.9%, sodium chloride 0.9%, sodium chloride 0.9%   In-Hospital IV Infusion Medications:   norepinephrine bitartrate-D5W Stopped (19 0715)     Antibiotics and Day Number of Therapy:  Vancomycin:  - current  Zosyn:  - current    Objective   Last 24 Hour Vital Signs:  BP  Min: 83/52  Max: 139/68  Temp  Av °F (36.7 °C)  Min: 97.6 °F (36.4 °C)  Max: 98.4 °F (36.9 °C)  Pulse  Av.4  Min: 80  Max: 105  Resp  Av.4  Min: 15  Max: 41  SpO2  Av.4 %  Min: 92 %  Max: 100 %    Lines, Drains, Airway:  Trialysis catheter - 7/10  Esparza Catheter -     Physical Examination:  Examination  General: Patient sleepy but easily aroused; lying comfortably in NAD. Obese body habitus.  HEENT: normocephalic, atraumatic, EOMI; no thrush noted  Neck: No thyromegaly or masses   Cardiac: RRR, no murmurs appreciated, no extra heart sounds  Pulmonary/Chest: CTAB, symmetric chest rise, no wheezing or crackles  - while in room patient taken off of nasal cannula and remained with O2 at  100% saturation  GI: Soft, tender to deep palpation, distended, normoactive bowel sounds  Extremities: no edema, clubbing, or cyanosis  Skin: dry, warm, intact. No bruising or rashes.  Neuro: Alert and oriented    Laboratory:  CBC:   Lab Results   Component Value Date    WBC 25.27 (H) 07/20/2019    HGB 7.9 (L) 07/20/2019     07/20/2019    MCV 92 07/20/2019    RDW 16.2 (H) 07/20/2019     Estimated Creatinine Clearance: 18.7 mL/min (A) (based on SCr of 3.3 mg/dL (H)).    Microbiology Data:  Blood culture (7/11) - negative  Blood culture (7/19) - pending  Respiratory culture - pending  C Diff - negative    Radiology:  CT Abdomen/Pelvis (7/18)  The liver, spleen, gallbladder appear normal.  There is a small amount of perihepatic fluid.  The pancreas and adrenal glands are unremarkable.  There is no hydronephrosis.  Multiple cysts are present in the left kidney.  There is an approximately 15 x 4 cm hematoma within the lesser sac.  Within this hematoma there are areas of increased attenuation suggesting active extravasation.  There is moderate fat stranding and hemorrhage in the region of the hepatic flexure and thickening of the right lateral conal fascia.  No bowel obstruction.  No free fluid.    CT Chest (7/18)  There is no CT evidence of pulmonary thromboembolism.  No enlarged hilar or mediastinal lymph nodes are seen.  No suspicious pulmonary nodules or masses are noted.  There is an area of pneumonia in the left upper lobe and mild dependent atelectasis in both lower lobes.  There is a trace left pleural fluid collection.    Assessment     Jose Martin Hutchins is a 78 y.o. male with multiple comorbidities including chronic inflammatory demyelinating polyneuritis. He was transferred to [facility] for higher level of care and need for interventional radiology for a retroperitoneal hematoma.    Patient acutely ill due to combination of retroperitoneal hematoma as well as upper lobe pneumonia found on CT chest.  White count spike to 30 within the past 2 days and 2 episodes of hypotension. Will cover broadly for pneumonia (Spo2 93% on Room air) since he has risk factors for nosocomial organisms and aspiration (seizure-like episode).     This patient has been on prolonged courses of high dose steroids and therefore can be considered immunosuppressed. For this reason, organisms other than the classic bacteria should be considered as causes of his left upper lobe infiltrate     Recommendations     Left Upper Lobe Pneumonia  - Continue Vancomycin with goal trough 15-20  - change Zosyn to cefepime 2g IV q24 plus metronidazole 500mg IV q8  - will add doxycycline 100mg IV/po q12 for atypical coverage since telemetry QTc was elevated    - ideally would want bronch for fungal cultures, PJP and AFB  - will need to consider PJP prophylaxis if steroids are to continue     Thank you for the consult. Please call with any questions.    Rosy Fontaine

## 2019-07-21 PROBLEM — D63.8 ANEMIA OF CHRONIC DISEASE: Status: ACTIVE | Noted: 2019-07-21

## 2019-07-21 LAB
ALBUMIN SERPL BCP-MCNC: 3.3 G/DL (ref 3.5–5.2)
ALP SERPL-CCNC: 66 U/L (ref 55–135)
ALT SERPL W/O P-5'-P-CCNC: 41 U/L (ref 10–44)
ANION GAP SERPL CALC-SCNC: 13 MMOL/L (ref 8–16)
ANISOCYTOSIS BLD QL SMEAR: SLIGHT
APTT BLDCRRT: 39.6 SEC (ref 21–32)
APTT BLDCRRT: 39.6 SEC (ref 21–32)
AST SERPL-CCNC: 57 U/L (ref 10–40)
BASOPHILS # BLD AUTO: ABNORMAL K/UL (ref 0–0.2)
BASOPHILS NFR BLD: 0 % (ref 0–1.9)
BILIRUB SERPL-MCNC: 0.7 MG/DL (ref 0.1–1)
BUN SERPL-MCNC: 60 MG/DL (ref 8–23)
CALCIUM SERPL-MCNC: 8.2 MG/DL (ref 8.7–10.5)
CHLORIDE SERPL-SCNC: 92 MMOL/L (ref 95–110)
CO2 SERPL-SCNC: 26 MMOL/L (ref 23–29)
CREAT SERPL-MCNC: 3 MG/DL (ref 0.5–1.4)
DIFFERENTIAL METHOD: ABNORMAL
EOSINOPHIL # BLD AUTO: ABNORMAL K/UL (ref 0–0.5)
EOSINOPHIL NFR BLD: 0 % (ref 0–8)
ERYTHROCYTE [DISTWIDTH] IN BLOOD BY AUTOMATED COUNT: 17.8 % (ref 11.5–14.5)
EST. GFR  (AFRICAN AMERICAN): 22 ML/MIN/1.73 M^2
EST. GFR  (NON AFRICAN AMERICAN): 19 ML/MIN/1.73 M^2
FIBRINOGEN PPP-MCNC: 313 MG/DL (ref 182–366)
GLUCOSE SERPL-MCNC: 86 MG/DL (ref 70–110)
HCT VFR BLD AUTO: 28.8 % (ref 40–54)
HGB BLD-MCNC: 9.7 G/DL (ref 14–18)
INR PPP: 1.1 (ref 0.8–1.2)
LYMPHOCYTES # BLD AUTO: ABNORMAL K/UL (ref 1–4.8)
LYMPHOCYTES NFR BLD: 2 % (ref 18–48)
MAGNESIUM SERPL-MCNC: 1.9 MG/DL (ref 1.6–2.6)
MCH RBC QN AUTO: 29.1 PG (ref 27–31)
MCHC RBC AUTO-ENTMCNC: 33.7 G/DL (ref 32–36)
MCV RBC AUTO: 87 FL (ref 82–98)
MONOCYTES # BLD AUTO: ABNORMAL K/UL (ref 0.3–1)
MONOCYTES NFR BLD: 0 % (ref 4–15)
MYELOCYTES NFR BLD MANUAL: 1 %
NEUTROPHILS NFR BLD: 94 % (ref 38–73)
NEUTS BAND NFR BLD MANUAL: 3 %
PHOSPHATE SERPL-MCNC: 5.1 MG/DL (ref 2.7–4.5)
PLATELET # BLD AUTO: 156 K/UL (ref 150–350)
PLATELET BLD QL SMEAR: ABNORMAL
PMV BLD AUTO: 8.5 FL (ref 9.2–12.9)
POCT GLUCOSE: 127 MG/DL (ref 70–110)
POCT GLUCOSE: 161 MG/DL (ref 70–110)
POCT GLUCOSE: 170 MG/DL (ref 70–110)
POTASSIUM SERPL-SCNC: 3.8 MMOL/L (ref 3.5–5.1)
PROT SERPL-MCNC: 5.5 G/DL (ref 6–8.4)
PROTHROMBIN TIME: 11.1 SEC (ref 9–12.5)
RBC # BLD AUTO: 3.33 M/UL (ref 4.6–6.2)
SODIUM SERPL-SCNC: 131 MMOL/L (ref 136–145)
TOXIC GRANULES BLD QL SMEAR: PRESENT
WBC # BLD AUTO: 21.14 K/UL (ref 3.9–12.7)

## 2019-07-21 PROCEDURE — 85384 FIBRINOGEN ACTIVITY: CPT

## 2019-07-21 PROCEDURE — 63600175 PHARM REV CODE 636 W HCPCS: Performed by: INTERNAL MEDICINE

## 2019-07-21 PROCEDURE — 63600175 PHARM REV CODE 636 W HCPCS: Performed by: FAMILY MEDICINE

## 2019-07-21 PROCEDURE — 25000003 PHARM REV CODE 250: Performed by: FAMILY MEDICINE

## 2019-07-21 PROCEDURE — 83735 ASSAY OF MAGNESIUM: CPT

## 2019-07-21 PROCEDURE — 20000000 HC ICU ROOM

## 2019-07-21 PROCEDURE — 85007 BL SMEAR W/DIFF WBC COUNT: CPT

## 2019-07-21 PROCEDURE — 36415 COLL VENOUS BLD VENIPUNCTURE: CPT

## 2019-07-21 PROCEDURE — 85730 THROMBOPLASTIN TIME PARTIAL: CPT

## 2019-07-21 PROCEDURE — 84100 ASSAY OF PHOSPHORUS: CPT

## 2019-07-21 PROCEDURE — 99231 PR SUBSEQUENT HOSPITAL CARE,LEVL I: ICD-10-PCS | Mod: ,,, | Performed by: SURGERY

## 2019-07-21 PROCEDURE — 27000221 HC OXYGEN, UP TO 24 HOURS

## 2019-07-21 PROCEDURE — 97535 SELF CARE MNGMENT TRAINING: CPT

## 2019-07-21 PROCEDURE — 85610 PROTHROMBIN TIME: CPT

## 2019-07-21 PROCEDURE — 92526 ORAL FUNCTION THERAPY: CPT

## 2019-07-21 PROCEDURE — 80053 COMPREHEN METABOLIC PANEL: CPT

## 2019-07-21 PROCEDURE — 94761 N-INVAS EAR/PLS OXIMETRY MLT: CPT

## 2019-07-21 PROCEDURE — 85027 COMPLETE CBC AUTOMATED: CPT

## 2019-07-21 PROCEDURE — S0030 INJECTION, METRONIDAZOLE: HCPCS | Performed by: INTERNAL MEDICINE

## 2019-07-21 PROCEDURE — 25000003 PHARM REV CODE 250: Performed by: INTERNAL MEDICINE

## 2019-07-21 PROCEDURE — S0028 INJECTION, FAMOTIDINE, 20 MG: HCPCS | Performed by: FAMILY MEDICINE

## 2019-07-21 PROCEDURE — 99231 SBSQ HOSP IP/OBS SF/LOW 25: CPT | Mod: ,,, | Performed by: SURGERY

## 2019-07-21 PROCEDURE — 63600175 PHARM REV CODE 636 W HCPCS: Performed by: STUDENT IN AN ORGANIZED HEALTH CARE EDUCATION/TRAINING PROGRAM

## 2019-07-21 PROCEDURE — 25000003 PHARM REV CODE 250: Performed by: STUDENT IN AN ORGANIZED HEALTH CARE EDUCATION/TRAINING PROGRAM

## 2019-07-21 RX ORDER — DOCUSATE SODIUM 100 MG/1
100 CAPSULE, LIQUID FILLED ORAL 2 TIMES DAILY
Status: DISCONTINUED | OUTPATIENT
Start: 2019-07-21 | End: 2019-07-29 | Stop reason: HOSPADM

## 2019-07-21 RX ORDER — HYDROCODONE BITARTRATE AND ACETAMINOPHEN 5; 325 MG/1; MG/1
1 TABLET ORAL EVERY 6 HOURS PRN
Status: DISCONTINUED | OUTPATIENT
Start: 2019-07-21 | End: 2019-07-29 | Stop reason: HOSPADM

## 2019-07-21 RX ORDER — POLYETHYLENE GLYCOL 3350 17 G/17G
17 POWDER, FOR SOLUTION ORAL 2 TIMES DAILY
Status: DISCONTINUED | OUTPATIENT
Start: 2019-07-21 | End: 2019-07-29 | Stop reason: HOSPADM

## 2019-07-21 RX ORDER — MORPHINE SULFATE 2 MG/ML
2 INJECTION, SOLUTION INTRAMUSCULAR; INTRAVENOUS EVERY 4 HOURS PRN
Status: DISCONTINUED | OUTPATIENT
Start: 2019-07-21 | End: 2019-07-29 | Stop reason: HOSPADM

## 2019-07-21 RX ORDER — FUROSEMIDE 10 MG/ML
20 INJECTION INTRAMUSCULAR; INTRAVENOUS 2 TIMES DAILY
Status: DISCONTINUED | OUTPATIENT
Start: 2019-07-21 | End: 2019-07-22

## 2019-07-21 RX ADMIN — FUROSEMIDE 20 MG: 10 INJECTION, SOLUTION INTRAMUSCULAR; INTRAVENOUS at 05:07

## 2019-07-21 RX ADMIN — LACTOBACILLUS TAB 1 TABLET: TAB at 05:07

## 2019-07-21 RX ADMIN — DOCUSATE SODIUM 100 MG: 100 CAPSULE, LIQUID FILLED ORAL at 08:07

## 2019-07-21 RX ADMIN — METRONIDAZOLE 500 MG: 500 INJECTION, SOLUTION INTRAVENOUS at 10:07

## 2019-07-21 RX ADMIN — LIDOCAINE HYDROCHLORIDE 15 ML: 20 SOLUTION ORAL; TOPICAL at 04:07

## 2019-07-21 RX ADMIN — INSULIN DETEMIR 12 UNITS: 100 INJECTION, SOLUTION SUBCUTANEOUS at 08:07

## 2019-07-21 RX ADMIN — TAMSULOSIN HYDROCHLORIDE 0.4 MG: 0.4 CAPSULE ORAL at 09:07

## 2019-07-21 RX ADMIN — NYSTATIN 500000 UNITS: 100000 SUSPENSION ORAL at 11:07

## 2019-07-21 RX ADMIN — METRONIDAZOLE 500 MG: 500 INJECTION, SOLUTION INTRAVENOUS at 06:07

## 2019-07-21 RX ADMIN — METRONIDAZOLE 500 MG: 500 INJECTION, SOLUTION INTRAVENOUS at 02:07

## 2019-07-21 RX ADMIN — DOCUSATE SODIUM 100 MG: 100 CAPSULE, LIQUID FILLED ORAL at 01:07

## 2019-07-21 RX ADMIN — FAMOTIDINE 20 MG: 10 INJECTION, SOLUTION INTRAVENOUS at 09:07

## 2019-07-21 RX ADMIN — CEFEPIME 2 G: 2 INJECTION, POWDER, FOR SOLUTION INTRAVENOUS at 09:07

## 2019-07-21 RX ADMIN — HYDROCODONE BITARTRATE AND ACETAMINOPHEN 1 TABLET: 5; 325 TABLET ORAL at 03:07

## 2019-07-21 RX ADMIN — PREDNISONE 15 MG: 10 TABLET ORAL at 09:07

## 2019-07-21 RX ADMIN — LACTOBACILLUS TAB 1 TABLET: TAB at 11:07

## 2019-07-21 RX ADMIN — MORPHINE SULFATE 2 MG: 2 INJECTION, SOLUTION INTRAMUSCULAR; INTRAVENOUS at 10:07

## 2019-07-21 RX ADMIN — DOXYCYCLINE 100 MG: 100 INJECTION, POWDER, LYOPHILIZED, FOR SOLUTION INTRAVENOUS at 04:07

## 2019-07-21 RX ADMIN — FOLIC ACID 1 MG: 1 TABLET ORAL at 09:07

## 2019-07-21 RX ADMIN — DULOXETINE 30 MG: 30 CAPSULE, DELAYED RELEASE ORAL at 09:07

## 2019-07-21 RX ADMIN — VANCOMYCIN HYDROCHLORIDE 1000 MG: 1 INJECTION, POWDER, LYOPHILIZED, FOR SOLUTION INTRAVENOUS at 10:07

## 2019-07-21 RX ADMIN — POLYETHYLENE GLYCOL 3350 17 G: 17 POWDER, FOR SOLUTION ORAL at 09:07

## 2019-07-21 RX ADMIN — DOXYCYCLINE 100 MG: 100 INJECTION, POWDER, LYOPHILIZED, FOR SOLUTION INTRAVENOUS at 03:07

## 2019-07-21 RX ADMIN — NYSTATIN 500000 UNITS: 100000 SUSPENSION ORAL at 05:07

## 2019-07-21 NOTE — PROGRESS NOTES
NEUROLOGY Progress Note    Reason for consult:  Seizure like event    HPI:   Jose Martin Hutchins is a 78 y.o. 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 Admitted originally for elective plasmapheresis. Family reports significant improvement following initial 2 plasma exchange treatments, before he decompensated again due to internal bleeding +/- underlying infection. Upon arrival to unit patient was noted to have gaze deviation and was briefly unresponsive when transferred from stretcher to bed, his SBP was in the 70s at the time, neurology consulted for possible seizure activity. EEG performed 7/19/19 suggestive of moderate diffuse or multifocal cerebral dysfunction but no epileptiform activity seen.     Interval Hx:  No acute events overnight. Per nurse and family at bedside pt more alert, not at his cognitive baseline but with improvement in patient mentation. No abnormal movements, no LOC overnight. Pt complaining of abdominal pain and constipation.  Currently BP 99/61 MAP 74 without pressor support. Sat 97% on 1L NC.     Objective:     Vital Signs:   Vitals:    07/21/19 0900   BP: 105/64   Pulse: 82   Resp: (!) 26   Temp:       Neurological Exam (limited due to patient complaining of abdominal pain)  Older man, resting in bed, awake, good visual tracking, following commands beside requires repeated prompting to participate in exam.  Oriented to person, situation, family, place and year.  EOMI, PERRL, visual fields full, facial sensation intact V I-III, no facial asymmetry. Able to open mouth, cracked and dry tongue, reduced protrusion and lateral movements.  Motor: 4/5 biceps, 3/5 triceps, hand  bilaterally  Unable to  resist gravity in the lower extremities. Unable to assess motor exam further.  Decreased sensation to light touch distally x 4. Decreased vibration distally in 4 extremities  Absent reflexes, no clonus, mute plantar response  Slight dysarthria.  Intermittent tremor in bilateral upper extremities R>L, mostly when arms not completely relaxed. Tremor absent at rest.     LABORATORY STUDIES:  Recent Results (from the past 24 hour(s))   POCT glucose    Collection Time: 07/20/19 11:05 AM   Result Value Ref Range    POCT Glucose 212 (H) 70 - 110 mg/dL   Hemoglobin    Collection Time: 07/20/19 11:41 AM   Result Value Ref Range    Hemoglobin 7.9 (L) 14.0 - 18.0 g/dL   Hematocrit    Collection Time: 07/20/19 11:41 AM   Result Value Ref Range    Hematocrit 24.1 (L) 40.0 - 54.0 %   Basic metabolic panel    Collection Time: 07/20/19 11:41 AM   Result Value Ref Range    Sodium 128 (L) 136 - 145 mmol/L    Potassium 4.9 3.5 - 5.1 mmol/L    Chloride 92 (L) 95 - 110 mmol/L    CO2 24 23 - 29 mmol/L    Glucose 202 (H) 70 - 110 mg/dL    BUN, Bld 62 (H) 8 - 23 mg/dL    Creatinine 3.3 (H) 0.5 - 1.4 mg/dL    Calcium 8.0 (L) 8.7 - 10.5 mg/dL    Anion Gap 12 8 - 16 mmol/L    eGFR if African American 20 (A) >60 mL/min/1.73 m^2    eGFR if non African American 17 (A) >60 mL/min/1.73 m^2   POCT glucose    Collection Time: 07/20/19  4:45 PM   Result Value Ref Range    POCT Glucose 171 (H) 70 - 110 mg/dL   POCT glucose    Collection Time: 07/20/19  9:11 PM   Result Value Ref Range    POCT Glucose 162 (H) 70 - 110 mg/dL   Fibrinogen    Collection Time: 07/21/19  4:10 AM   Result Value Ref Range    Fibrinogen 313 182 - 366 mg/dL   APTT    Collection Time: 07/21/19  4:10 AM   Result Value Ref Range    aPTT 39.6 (H) 21.0 - 32.0 sec   Comprehensive Metabolic Panel (CMP)    Collection Time: 07/21/19  4:10 AM   Result Value Ref Range    Sodium 131 (L) 136 - 145 mmol/L    Potassium 3.8 3.5 - 5.1 mmol/L    Chloride 92 (L) 95 - 110 mmol/L    CO2 26 23 - 29  mmol/L    Glucose 86 70 - 110 mg/dL    BUN, Bld 60 (H) 8 - 23 mg/dL    Creatinine 3.0 (H) 0.5 - 1.4 mg/dL    Calcium 8.2 (L) 8.7 - 10.5 mg/dL    Total Protein 5.5 (L) 6.0 - 8.4 g/dL    Albumin 3.3 (L) 3.5 - 5.2 g/dL    Total Bilirubin 0.7 0.1 - 1.0 mg/dL    Alkaline Phosphatase 66 55 - 135 U/L    AST 57 (H) 10 - 40 U/L    ALT 41 10 - 44 U/L    Anion Gap 13 8 - 16 mmol/L    eGFR if African American 22 (A) >60 mL/min/1.73 m^2    eGFR if non African American 19 (A) >60 mL/min/1.73 m^2   Magnesium    Collection Time: 07/21/19  4:10 AM   Result Value Ref Range    Magnesium 1.9 1.6 - 2.6 mg/dL   Phosphorus    Collection Time: 07/21/19  4:10 AM   Result Value Ref Range    Phosphorus 5.1 (H) 2.7 - 4.5 mg/dL   CBC with Automated Differential    Collection Time: 07/21/19  4:10 AM   Result Value Ref Range    WBC 21.14 (H) 3.90 - 12.70 K/uL    RBC 3.33 (L) 4.60 - 6.20 M/uL    Hemoglobin 9.7 (L) 14.0 - 18.0 g/dL    Hematocrit 28.8 (L) 40.0 - 54.0 %    Mean Corpuscular Volume 87 82 - 98 fL    Mean Corpuscular Hemoglobin 29.1 27.0 - 31.0 pg    Mean Corpuscular Hemoglobin Conc 33.7 32.0 - 36.0 g/dL    RDW 17.8 (H) 11.5 - 14.5 %    Platelets 156 150 - 350 K/uL    MPV 8.5 (L) 9.2 - 12.9 fL    Lymph # CANCELED 1.0 - 4.8 K/uL    Mono # CANCELED 0.3 - 1.0 K/uL    Eos # CANCELED 0.0 - 0.5 K/uL    Baso # CANCELED 0.00 - 0.20 K/uL    Gran% 94.0 (H) 38.0 - 73.0 %    Lymph% 2.0 (L) 18.0 - 48.0 %    Mono% 0.0 (L) 4.0 - 15.0 %    Eosinophil% 0.0 0.0 - 8.0 %    Basophil% 0.0 0.0 - 1.9 %    Bands 3.0 %    Myelocytes 1.0 %    Platelet Estimate Appears normal     Aniso Slight     Toxic Granulation Present     Differential Method Manual    PT/INR    Collection Time: 07/21/19  4:10 AM   Result Value Ref Range    Prothrombin Time 11.1 9.0 - 12.5 sec    INR 1.1 0.8 - 1.2   PTT    Collection Time: 07/21/19  4:10 AM   Result Value Ref Range    aPTT 39.6 (H) 21.0 - 32.0 sec   POCT glucose    Collection Time: 07/21/19  8:02 AM   Result Value Ref Range     POCT Glucose 127 (H) 70 - 110 mg/dL       RADIOLOGY STUDIES:  I have personally reviewed the images performed.     HEAD CT: no recent study    BRAIN MRI; chronic changes      A&P   77 y/o man w/ hx of HTN, p-afib, CAD, CIDP, recurrent infections transferred to [facility] for IR services on pressor support due to hemodynamic instability from acute blood loss after he was found to have retroperitoneal hemorrhage which may have been related to acute coagulapathy following plasma exchange. Noted to have episode of brief unresponsiveness associated with acute hypotension, neurology consulted due to concern for seizure activity. Episode likely related to hypotension. Initially encephalopathic, but oriented to self and situation, following requests.  EEG performed 7/19/19 suggestive of moderate diffuse or multifocal cerebral dysfunction but no epileptiform activity seen. Per family currently no at his cognitive baseline but much improved from admission. No new events of LOC. No clinical seizure-activity overnight.    Acute encephalopathy in setting of hypotension, sepsis, anemia, ANDIE. Transient unresponsiveness.  - acutely hypotensive during episode of transient unresponsiveness. Multiple factors for AMS includes sepsis, hypotension, ANDIE. Currently improving after antibiotics and more stable BPs.  - continue telemetry, q 4 h neurochecks, stat CT head w/ worsening mental status/decreased LOC  - SBP<180, MAPs> 70; pressors as needed  - BG<180 SSi as needed  - Recommend not starting any AEDs unless he has continued seizure activity while hemodynamically stable  - 2 mg Ativan IV for generalized seizures lasting > 5 min  - Spot EEG w/ diffuse slowing consistent with encephalopathy, no epileptiform activity reported   - metabolic abnormalities/empiric antibiotics per primary; avoid cefepime as this can worsen AMS.  - delirium precautions, limit sedation  -ST consulted and assisting  - Consider repeating EEG when metabolic  abnormalities/sepsis improve.  -Pt started on Cefepime for left upper lobe pneumonia. If mental status declines we aware of encephalopathy could be an adverse reaction to cefepime. Also cefepime decrease seizure threshold.    CIDP  - Diagnosed by outpatient neurologist several months ago per wife  - no improvement to prednisone 60 mg qd previously, admitted for elective PE with significant response per family  - recommend withholding additional plasma exchange therapies given concern for iatrogenic coagulapathy  - continue steroids 15 mg qd  - can consider IVIG while inpatient once clinically stable  - patient will likely benefit from subcutaneous IG as outpatient for continued treatment of CIDP once outside of acute illness    Bilateral upper extremities tremors  -Asymmetric hands tremors R>L, mostly when arms not completely relaxed. Tremor absent at rest.   -Per pt and family has been present for year.   -Seems like essential tremor type tremors. Never treated in the past.   -Defer initiation of any treatment at this time. Could be evaluated as outpatient.    Case seen and discussed with Dr. Clark , staff MD.    We will follow with you, please page Neurology resident on call with any further questions.    Fouzia Shannon MD  [employer]

## 2019-07-21 NOTE — ASSESSMENT & PLAN NOTE
Unclear etiology, concern for Pneumonia  WBC on admission 28 and patient was on pressors   Blood cultures from outside hospital: NGTD  ID consulted: Continue Cefepime, Flagyl, Vancomycin, Doxycycline. Recommend Pulm consult due to wedge-shaped pulm infiltrate for possible bronc if patient shows no improvement;   Blood cultures: NGTD

## 2019-07-21 NOTE — ASSESSMENT & PLAN NOTE
Hold home BP medications  Patient was transferred on pressors   Currently has been off of pressors since 7/20  BP goal < 140/80  W

## 2019-07-21 NOTE — SUBJECTIVE & OBJECTIVE
Interval History: Patient is awake and alert this morning. Wife is at bedside and reports improvement in patient mentation. Patient has been off of Levophed since 7/20 at 7:15AM. Patient has not had a bowel movements. Wife reports patient is unable to have one on the bed pan and would like the bedside commode.     Review of Systems   Constitutional: Negative for activity change, appetite change, chills, fatigue and fever.   Respiratory: Negative for cough, choking, shortness of breath and wheezing.    Cardiovascular: Negative for chest pain and leg swelling.   Gastrointestinal: Positive for constipation. Negative for abdominal pain, blood in stool, diarrhea and nausea.   Skin: Negative for rash and wound.   Neurological: Positive for weakness. Negative for dizziness, light-headedness and headaches.     Objective:     Vital Signs (Most Recent):  Temp: 98 °F (36.7 °C) (07/21/19 0738)  Pulse: 84 (07/21/19 0800)  Resp: (!) 30 (07/21/19 0800)  BP: 99/61 (07/21/19 0800)  SpO2: 96 % (07/21/19 0800) Vital Signs (24h Range):  Temp:  [97.4 °F (36.3 °C)-98.4 °F (36.9 °C)] 98 °F (36.7 °C)  Pulse:  [74-97] 84  Resp:  [13-40] 30  SpO2:  [86 %-100 %] 96 %  BP: ()/(51-71) 99/61     Weight: 77.1 kg (170 lb)  Body mass index is 25.1 kg/m².    Intake/Output Summary (Last 24 hours) at 7/21/2019 0831  Last data filed at 7/21/2019 0800  Gross per 24 hour   Intake 1516.67 ml   Output 3120 ml   Net -1603.33 ml      Physical Exam   Constitutional: He is oriented to person, place, and time. He appears well-developed.   HENT:   Head: Normocephalic and atraumatic.   Mouth/Throat: No oropharyngeal exudate.   Eyes: EOM are normal. Right eye exhibits no discharge. Left eye exhibits no discharge. No scleral icterus.   Neck: Normal range of motion. No thyromegaly present.   Cardiovascular: Normal rate, regular rhythm, normal heart sounds and intact distal pulses. Exam reveals no gallop and no friction rub.   No murmur heard.  Trialysis Cath  in the left subclavian    Pulmonary/Chest: Effort normal and breath sounds normal. No stridor. No respiratory distress. He has no wheezes. He has no rales. He exhibits no tenderness.   Abdominal: Soft. Bowel sounds are normal. He exhibits no distension. There is tenderness. There is no guarding.   Genitourinary:   Genitourinary Comments: Esparza in place   Musculoskeletal: He exhibits no edema or deformity.   Neurological: He is alert and oriented to person, place, and time.   Alert and oriented to self, place, and time.   Strength: 3/5 in all 4 extremities   Skin: Skin is warm. No rash noted. No erythema.   Nursing note and vitals reviewed.      Significant Labs:   Blood Culture:   Recent Labs   Lab 07/19/19  1349 07/19/19  1403   LABBLOO No Growth to date  No Growth to date No Growth to date  No Growth to date     CBC:   Recent Labs   Lab 07/19/19  1010  07/20/19  0416 07/20/19  1141 07/21/19  0410   WBC 28.41*  --  25.27*  --  21.14*   HGB 8.6*   < > 6.8*  6.8* 7.9* 9.7*   HCT 26.0*   < > 20.7*  20.7* 24.1* 28.8*     --  218  --  156    < > = values in this interval not displayed.     CMP:   Recent Labs   Lab 07/19/19  1010 07/20/19  0416 07/20/19  1141 07/21/19  0410   * 130* 128* 131*   K 5.3* 4.9 4.9 3.8   CL 89* 92* 92* 92*   CO2 24 26 24 26   * 171* 202* 86   BUN 54* 61* 62* 60*   CREATININE 2.8* 3.4* 3.3* 3.0*   CALCIUM 8.8 8.2* 8.0* 8.2*   PROT 5.2* 4.9*  --  5.5*   ALBUMIN 3.5 3.1*  --  3.3*   BILITOT 0.8 0.6  --  0.7   ALKPHOS 58 59  --  66   AST 56* 46*  --  57*   ALT 42 37  --  41   ANIONGAP 15 12 12 13   EGFRNONAA 21* 16* 17* 19*     Magnesium:   Recent Labs   Lab 07/19/19  1010 07/20/19  0416 07/21/19  0410   MG 2.1 2.0 1.9     POCT Glucose:   Recent Labs   Lab 07/20/19  1645 07/20/19  2111 07/21/19  0802   POCTGLUCOSE 171* 162* 127*       Significant Imaging: Reviewed all imaging from the past 24 hours

## 2019-07-21 NOTE — ASSESSMENT & PLAN NOTE
Neuro consulted and appreciate rec  Current on Prednisone 15 mg qd per neuro recs.   Consider IVIG once more hemodynamically stable.

## 2019-07-21 NOTE — ASSESSMENT & PLAN NOTE
Continue Insulin determir 12 units QHS along with moderate dose correcting scale.   POCT glucose QID  Will continue to monitor.

## 2019-07-21 NOTE — PT/OT/SLP PROGRESS
Physical Therapy      Patient Name:  Jose Martin Hutchins   MRN:  [id number]    Patient not seen today secondary to nurse request for hold due to pt's increased pain and attending wanting to further assess hematoma . Will follow up as appropriate.    Sumeet Hernandez, PT

## 2019-07-21 NOTE — CONSULTS
[facility]  Pulmonary Initial Consultation Note      Reason for Consultation: Bronchoscopy of JAIMIE infiltrate     History of Present Illness:  Mr. Hutchins is a 78 y.o. male with a history of CIDP on chronic steroids, CAD s/p CABG, CKD, hx Guillian Burre syndrome, Afib, RA, HTN, and GERD. He recently experienced a complex hospital course at [facility] which including elective plasmapheresis (x3) for worsening CIDP and septic from a UTI vs. JAIMIE Pneumonia (?). He was transferred to [place] on 7/19/19 for IR intervention for a 15 x 4 retroperioneal hematoma. The patient was found to have a persistent JAIMIE infiltrate (noted on 7/10/19 imaging) and worsening leukocytosis. The patient's family is at beside and provides most of the history as patient was asleep but arousable and answers questions appropriately. The patient denies recent history of fevers, chills, and only reports occasional cough. He was a former smoker for 12 years (unclear on pack years) but he quit approximately 30 years ago. He denies environmental exposures.       Patient Active Problem List    Diagnosis Date Noted    Anemia of chronic disease 07/21/2019    Pneumonia of left upper lobe due to infectious organism     Seizure-like activity 07/19/2019    Sepsis 07/19/2019    Type 2 diabetes mellitus, without long-term current use of insulin 07/19/2019    ANDIE (acute kidney injury) 07/19/2019    Intraperitoneal hemorrhage     Hypotension 07/18/2019    Skin tear of left upper arm without complication 07/12/2019    Pressure injury of right buttock, stage 3 07/12/2019    Urinary retention 07/12/2019    Observation for suspected infectious endocarditis     Febrile illness, acute 07/11/2019    Increased nutritional needs 07/11/2019    CIDP (chronic inflammatory demyelinating polyneuropathy) 07/10/2019    CAD (coronary artery disease) 07/10/2019    Essential hypertension 07/10/2019    Paroxysmal atrial fibrillation  07/10/2019    Gastroesophageal reflux disease without esophagitis 07/10/2019       Medications Prior to Admission   Medication Sig Dispense Refill Last Dose    aspirin (ECOTRIN) 81 MG EC tablet Take 81 mg by mouth once daily.       dextrose 5 % SolP 250 mL with norepinephrine 1 mg/mL Soln 4 mg Inject 1.698 mcg/min into the vein continuous.       dicyclomine (BENTYL) 10 MG capsule Take 10 mg by mouth 3 (three) times daily as needed.       diphenoxylate-atropine 2.5-0.025 mg (LOMOTIL) 2.5-0.025 mg per tablet Take 1 tablet by mouth 4 (four) times daily as needed for Diarrhea.       DULoxetine (CYMBALTA) 30 MG capsule Take 30 mg by mouth once daily.       fluconazole (DIFLUCAN) 100 MG tablet Take 1 tablet (100 mg total) by mouth once daily. for 6 days 6 tablet 0     folic acid (FOLVITE) 1 MG tablet Take 1 mg by mouth once daily.       gabapentin (NEURONTIN) 600 MG tablet Take 600 mg by mouth 3 (three) times daily.       hydroCHLOROthiazide (HYDRODIURIL) 25 MG tablet Take 1 tablet (25 mg total) by mouth once daily. for 6 days 6 tablet 0     Lactobacillus rhamnosus GG (CULTURELLE) 10 billion cell capsule Take 1 capsule by mouth once daily.       metoprolol succinate (TOPROL-XL) 25 MG 24 hr tablet Take 1 tablet (25 mg total) by mouth once daily. 30 tablet 11     nitroGLYCERIN (NITROSTAT) 0.4 MG SL tablet Place 0.4 mg under the tongue every 5 (five) minutes as needed for Chest pain.       omega-3 acid ethyl esters (LOVAZA) 1 gram capsule Take 2 g by mouth 2 (two) times daily.       oxybutynin (DITROPAN) 5 MG Tab Take 5 mg by mouth 2 (two) times daily.       pantoprazole (PROTONIX) 40 MG tablet Take 1 tablet (40 mg total) by mouth once daily. 30 tablet 11     piperacillin sodium/tazobactam (PIPERACILLIN-TAZOBACTAM 3.375G/50ML D5W, READY TO MIX,) Inject 50 mLs (3.375 g total) into the vein every 8 (eight) hours.       tamsulosin (FLOMAX) 0.4 mg Cap Take 0.4 mg by mouth once daily.       vitamin D (VITAMIN  "D3) 1000 units Tab Take 1,000 Units by mouth once daily.        Review of patient's allergies indicates:   Allergen Reactions    Sulfa (sulfonamide antibiotics) Hives    Ramucirumab Palpitations        Past Medical History:   Diagnosis Date    A-fib     Coronary artery disease     GERD (gastroesophageal reflux disease)     Hypertension     Kidney function abnormal     Rheumatoid arthritis       Past Surgical History:   Procedure Laterality Date    APPENDECTOMY      CORONARY ARTERY BYPASS GRAFT (CABG)      3 vessel    HERNIA REPAIR      INSERTION, CATHETER, VASCULAR, DUAL LUMEN N/A 7/10/2019    Performed by [facility] at [facility] ENDO    MULTIPLE TOOTH EXTRACTIONS  2019    3 teeth    TONSILLECTOMY        Family History   Problem Relation Age of Onset    No Known Problems Mother     No Known Problems Father       Social History     Tobacco Use    Smoking status: Never Smoker    Smokeless tobacco: Never Used   Substance Use Topics    Alcohol use: No     Frequency: Never        Review of Systems:  Patient with a history of chronic sinusitis and has reports sinus surgery.   Respiratory: negative for dyspnea on exertion, hemoptysis, pleurisy/chest pain, sputum and wheezing  Cardiovascular: negative for chest pain, chest pressure/discomfort, claudication, dyspnea, exertional chest pressure/discomfort, fatigue, palpitations and lower extremity edema    OBJECTIVE:     Vital signs in last 24 hours:  Temp:  [97.4 °F (36.3 °C)-98.3 °F (36.8 °C)] 98 °F (36.7 °C)  Pulse:  [76-92] 86  Resp:  [12-46] 28  SpO2:  [86 %-100 %] 93 %  BP: ()/(54-71) 104/56    /75   Pulse 87   Temp 97.5 °F (36.4 °C) (Oral)   Resp 19   Ht 5' 9" (1.753 m)   Wt 77.1 kg (170 lb)   SpO2 (!) 91%   BMI 25.10 kg/m²     General Appearance:    Alert, cooperative, no distress, appears stated age   Head:    Normocephalic, without obvious abnormality, atraumatic   Eyes:    PERRLA, EOMI          Nose: Septum midline with dried " blood in bilateral nostrils.        Neck:   Supple, symmetrical, trachea midline, no adenopathy;        thyroid:  No enlargement/tenderness/nodules; no carotid    bruit or JVD       Lungs:     Clear to auscultation bilaterally, respirations unlabored. Normal work of breathing        Heart:    Regular rate and rhythm, S1 and S2 normal, no murmur, rub   or gallop   Abdomen:     Soft, non-tender, bowel sounds active all four quadrants,     no masses, no organomegaly           Extremities:   Extremities normal, atraumatic, no cyanosis or edema   Pulses:   2+ and symmetric all extremities   Skin:   Skin color, texture, turgor normal, no rashes or lesions       Neurologic: No focal deficits.        Chest X-Ray:   FINDINGS:  The cardiac silhouette is unremarkable on this AP radiograph.  Stable left perihilar infiltrates/scarring.  Right lung is clear.  Left subclavian central line is present.      Impression       Stable left perihilar infiltrates/scarring.     Chest CT Non Coronaries 7/18/19  FINDINGS:  There is no CT evidence of pulmonary thromboembolism.  No enlarged hilar or mediastinal lymph nodes are seen.  No suspicious pulmonary nodules or masses are noted.  There is an area of pneumonia in the left upper lobe and mild dependent atelectasis in both lower lobes.  There is a trace left pleural fluid collection.      Impression       1. No CT evidence of pulmonary thromboembolism.  2. Left upper lobe pneumonia.  3. Trace left pleural fluid collection.  4. Mild dependent atelectasis bilaterally.         Data Review:  CBC:   Lab Results   Component Value Date    WBC 21.14 (H) 07/21/2019    RBC 3.33 (L) 07/21/2019    HGB 9.7 (L) 07/21/2019    HCT 28.8 (L) 07/21/2019     07/21/2019     BMP:   Lab Results   Component Value Date    GLU 86 07/21/2019     (L) 07/21/2019    K 3.8 07/21/2019    CL 92 (L) 07/21/2019    CO2 26 07/21/2019    BUN 60 (H) 07/21/2019    CREATININE 3.0 (H) 07/21/2019    CALCIUM 8.2 (L)  07/21/2019     Coagulation:   Lab Results   Component Value Date    INR 1.1 07/21/2019    APTT 39.6 (H) 07/21/2019    APTT 39.6 (H) 07/21/2019     ABGs: No results found for: PH, PO2, PCO2    ASSESSMENT and RECOMMENDATIONS:   Mr. Hutchisn is a 78 y.o. male with a history of CIDP on chronic steroids, CAD s/p CABG, CKD, hx Guillian Burre syndrome, Afib, RA, HTN, and GERD who has JAIMIE opacity with concern for infection.    JAIMIE Opacity with a Pneumonic appearance on CT thorax with an elevated right hemidiaphragm could be secondary to infection but cannot r/o malignancy given the patient's smoking history. Patient could possible also have a subpulmonic effusion resulting in an elevated right hemidiaphragm. Patient not currently on anticoagulation therapy and understands the risks and benefits of the procedure.   - NPO at midnight  - proceed with bronchoscopy w/BAL +/- biopsies of the JAIMIE via the Apicoposterior segment, passing scope through the mouth as patient with several traumatic unsuccessful attempts at NGT placement and hx sinus surgery.     Thank you for allowing us to participate in the care of this patient. Please do not hesitate to contact us with further questions for concerns.     Raleigh Piper MD  [employer] Fellow  Pager #: [phone]

## 2019-07-21 NOTE — PROGRESS NOTES
[facility] Medicine  Progress Note    Patient Name: Jose Martin Hutchins  MRN: [id number]  Patient Class: IP- Inpatient   Admission Date: 7/19/2019  Length of Stay: 2 days  Attending Physician: Al Boone III, MD  Primary Care Provider: Sam Washington MD        Subjective:     Principal Problem:Intraperitoneal hemorrhage      HPI:  77 yo male with pmhx of HTN, CAD, HLD, CKD, GERD, RA, CIDP, Guillain Burre syndrome and A.fib was transferred from [facility] for IR to drain his intraperitoneal hemorrhage. He was admitted in [facility] 8 days ago. He was initially admitted for elective plasmapheresis since his CIDP was worsening. During his stay he became septic and was found to have UTI. He was treated for UTI. While he was in the general floor, he developed fever, hypotensive and became hemodynamically unstable with a drop in his h/h. He had a CT abdomen done which was positive for intraperitoneal hemorrhage that needed to be assessed by IR for possible draining and embolizing the source.   During his transfer to [facility], EMS noticed that the pt had few episodes where his eyes rolled back and his body was shaking. He was also noticed to have similar episode when he was first seen in the ICU. During those episodes his BP was noticeably low as well. Pt was awake upon verbal command. He knows his name but is not oriented to time. He denies any chest pain, sob, nausea, vomiting. He does complaint of pain in his right side of the abdomen.  He has elevated WBC, but no growth from cultures in the outside facility.            Overview/Hospital Course:  No notes on file    Interval History: Patient is awake and alert this morning. Wife is at bedside and reports improvement in patient mentation. Patient has been off of Levophed since 7/20 at 7:15AM. Patient has not had a bowel movements. Wife reports patient is unable to have one on the bed pan and would like the bedside commode.     Review of Systems    Constitutional: Negative for activity change, appetite change, chills, fatigue and fever.   Respiratory: Negative for cough, choking, shortness of breath and wheezing.    Cardiovascular: Negative for chest pain and leg swelling.   Gastrointestinal: Positive for constipation. Negative for abdominal pain, blood in stool, diarrhea and nausea.   Skin: Negative for rash and wound.   Neurological: Positive for weakness. Negative for dizziness, light-headedness and headaches.     Objective:     Vital Signs (Most Recent):  Temp: 98 °F (36.7 °C) (07/21/19 0738)  Pulse: 84 (07/21/19 0800)  Resp: (!) 30 (07/21/19 0800)  BP: 99/61 (07/21/19 0800)  SpO2: 96 % (07/21/19 0800) Vital Signs (24h Range):  Temp:  [97.4 °F (36.3 °C)-98.4 °F (36.9 °C)] 98 °F (36.7 °C)  Pulse:  [74-97] 84  Resp:  [13-40] 30  SpO2:  [86 %-100 %] 96 %  BP: ()/(51-71) 99/61     Weight: 77.1 kg (170 lb)  Body mass index is 25.1 kg/m².    Intake/Output Summary (Last 24 hours) at 7/21/2019 0831  Last data filed at 7/21/2019 0800  Gross per 24 hour   Intake 1516.67 ml   Output 3120 ml   Net -1603.33 ml      Physical Exam   Constitutional: He is oriented to person, place, and time. He appears well-developed.   HENT:   Head: Normocephalic and atraumatic.   Mouth/Throat: No oropharyngeal exudate.   Eyes: EOM are normal. Right eye exhibits no discharge. Left eye exhibits no discharge. No scleral icterus.   Neck: Normal range of motion. No thyromegaly present.   Cardiovascular: Normal rate, regular rhythm, normal heart sounds and intact distal pulses. Exam reveals no gallop and no friction rub.   No murmur heard.  Trialysis Cath in the left subclavian    Pulmonary/Chest: Effort normal and breath sounds normal. No stridor. No respiratory distress. He has no wheezes. He has no rales. He exhibits no tenderness.   Abdominal: Soft. Bowel sounds are normal. He exhibits no distension. There is tenderness. There is no guarding.   Genitourinary:   Genitourinary  Comments: Esparza in place   Musculoskeletal: He exhibits no edema or deformity.   Neurological: He is alert and oriented to person, place, and time.   Alert and oriented to self, place, and time.   Strength: 3/5 in all 4 extremities   Skin: Skin is warm. No rash noted. No erythema.   Nursing note and vitals reviewed.      Significant Labs:   Blood Culture:   Recent Labs   Lab 07/19/19  1349 07/19/19  1403   LABBLOO No Growth to date  No Growth to date No Growth to date  No Growth to date     CBC:   Recent Labs   Lab 07/19/19  1010  07/20/19  0416 07/20/19  1141 07/21/19  0410   WBC 28.41*  --  25.27*  --  21.14*   HGB 8.6*   < > 6.8*  6.8* 7.9* 9.7*   HCT 26.0*   < > 20.7*  20.7* 24.1* 28.8*     --  218  --  156    < > = values in this interval not displayed.     CMP:   Recent Labs   Lab 07/19/19  1010 07/20/19  0416 07/20/19  1141 07/21/19  0410   * 130* 128* 131*   K 5.3* 4.9 4.9 3.8   CL 89* 92* 92* 92*   CO2 24 26 24 26   * 171* 202* 86   BUN 54* 61* 62* 60*   CREATININE 2.8* 3.4* 3.3* 3.0*   CALCIUM 8.8 8.2* 8.0* 8.2*   PROT 5.2* 4.9*  --  5.5*   ALBUMIN 3.5 3.1*  --  3.3*   BILITOT 0.8 0.6  --  0.7   ALKPHOS 58 59  --  66   AST 56* 46*  --  57*   ALT 42 37  --  41   ANIONGAP 15 12 12 13   EGFRNONAA 21* 16* 17* 19*     Magnesium:   Recent Labs   Lab 07/19/19  1010 07/20/19  0416 07/21/19  0410   MG 2.1 2.0 1.9     POCT Glucose:   Recent Labs   Lab 07/20/19  1645 07/20/19  2111 07/21/19  0802   POCTGLUCOSE 171* 162* 127*       Significant Imaging: Reviewed all imaging from the past 24 hours            Assessment/Plan:      Jose Martin Hutchins is a 78 y.o. male with evidence of an intraperitoneal hemorrhage, ANDIE most likely secondary to abx, and PNA. Patient has improvement in his mental status.       * Intraperitoneal hemorrhage  Pt transferred from [facility] with the concern for intraperitoneal hemorrhage for IR consult.   CT abdomen: Acute approximately 15 x 4 cm hematoma  within the region of the lesser sac with signs of active contrast extravasation.  IR consulted, currently no need for embolization at this time  GS consulted, potential for OR if vitals remain unstable  Will continue to monitor H/H      Anemia of chronic disease  Patient required 2u RBC's  H/H this AM: 9.7/28.8  Will continue to follow H/H    ANDIE (acute kidney injury)  BUN/Cr upon admission: 54/2.8. Baseline 35/1.2.   Likely 2/2 medications vs intravascular volume depletion.   Will start IVF and continue to monitor.   Avoid nephrotoxic agents.       Type 2 diabetes mellitus, without long-term current use of insulin  Continue Insulin determir 12 units QHS along with moderate dose correcting scale.   POCT glucose QID  Will continue to monitor.       Sepsis  Unclear etiology, concern for Pneumonia  WBC on admission 28 and patient was on pressors   Blood cultures from outside hospital: NGTD  ID consulted: Continue Cefepime, Flagyl, Vancomycin, Doxycycline. Recommend Pulm consult due to wedge-shaped pulm infiltrate for possible bronc if patient shows no improvement;   Blood cultures: NGTD      Seizure-like activity  Seizure like activity noticed, likely 2/2 hypotensive episodes.  EEG ordered.   Neuro consulted  Neuro checks q4h, will do stat CT with worsening mental status or decreased LOC.   Speech eval for swallowing.   2 mg Ativan IV for generalized seizures lasting > 5 min  Delirium and seizure precautions.       Urinary retention  Pt presented from the [facility] with the becerril for urinary retention.   Lasix was started  Urine output was 2.9L  Continue to monitor        Pressure injury of right buttock, stage 3  Wound care consulted.       Paroxysmal atrial fibrillation  Pt had A.fib in the outside facility, but upon admission he was NSR.   Will continue to monitor.     Essential hypertension  Hold home BP medications  Patient was transferred on pressors   Currently has been off of pressors since 7/20  BP  goal < 140/80  W      CIDP (chronic inflammatory demyelinating polyneuropathy)  Neuro consulted and appreciate rec  Current on Prednisone 15 mg qd per neuro recs.   Consider IVIG once more hemodynamically stable.           VTE Risk Mitigation (From admission, onward)        Ordered     IP VTE HIGH RISK PATIENT  Once      07/19/19 1452     Place JANE hose  Until discontinued      07/19/19 0910     Place sequential compression device  Until discontinued      07/19/19 0910          Critical care time spent on the evaluation and treatment of severe organ dysfunction, review of pertinent labs and imaging studies, discussions with consulting providers and discussions with patient/family: 30 minutes.    Mary Lou Gee, PGY-2  [facility] Family Medicine  07/21/2019 8:52 AM

## 2019-07-21 NOTE — PROGRESS NOTES
[facility] Infectious Disease Progress Note     Primary Team: Family Medicine  Consultant Attending: Alessia  Date of Admit: 7/19/2019    Summary of history     Jose Martin Hutchins is a 78 y.o. male with a relevant history of dyslipidemia, CAD, HTN, mitral regurgitation, tricuspid regurgitation, chronic renal disease, GERD, rheumatoid arthritis, and chronic inflammatory demyelinating polyneuritis.     The patient presented to [facility] on 7/19/2019 as a direct admit to the ICU from [facility] for high level of care and interventional radiology services due to suspected retroperitoneal hematoma.     The patient was initially admitted at [facility] for elective plasmapheresis planned by an outside neurologist for his worsening CIPD. During admission, he had a fever of 102 at which time urine cultures, blood cultures, and urinalysis were done. Cultures remained negative but urinalysis was suspect for UTI which was treated with vancomycin and ceftriaxone for 7 days. Per family, they report he was improving after the two initial plasma exchange treatments but acutely decompensated and became hypotensive yesterday afternoon. At this time, hemoglobin dropped from 11 to 9 and CT chest and abdomen showed upper lobe pneumonia and large hematoma in the lesser sac.     Upon arrival to the [facility] ICU, he became unresponsive while being transferred to the bed with systolic BP in the 70's. He has remained afebrile.    Interval events     Remains in the ICU (7/19 -current)    Subjective     Patient reports mild epigastric pain overnight. Sister at bedside reports he has continued to cough but not productive of phlegm or sputum. He states he feels as if he is breathing better and easier. Denies any fever, chills, nausea, vomiting, or diarrhea.    Current Medications:     Infusions:   norepinephrine bitartrate-D5W Stopped (07/20/19 0715)        Scheduled:   ceFEPime (MAXIPIME) IVPB  2 g Intravenous Daily     doxycycline (VIBRAMYCIN) IVPB  100 mg Intravenous Q12H    DULoxetine  30 mg Oral Daily    famotidine (PF)  20 mg Intravenous Daily    folic acid  1 mg Oral Daily    furosemide  40 mg Intravenous BID    insulin detemir U-100  12 Units Subcutaneous QHS    Lactobacillus acidoph-L.bulgar  1 tablet Oral TID WM    metronidazole  500 mg Intravenous Q8H    nystatin  500,000 Units Oral TID WM    polyethylene glycol  17 g Oral Daily    predniSONE  15 mg Oral Daily    tamsulosin  0.4 mg Oral Daily    vancomycin (VANCOCIN) IVPB  1,000 mg Intravenous Q24H        PRN:  sodium chloride, sodium chloride, acetaminophen, dextrose 50 % in water (D50W), dextrose 50 % in water (D50W), glucagon (human recombinant), glucose, glucose, glucose, glucose, insulin aspart U-100, magic mouthwash (diphenhydrAMINE 12.5 mg/5 mL 20 mL, aluminum & magnesium hydroxide-simethicone (MYLANTA) 20 mL, lidocaine HCl 2% (XYLOCAINE) 20 mL) solution, nitroGLYCERIN, sodium chloride 0.9%, sodium chloride 0.9%, sodium chloride 0.9%    Antibiotics and Day Number of Therapy:  Vancomycin:  - current  Cefepime:  - current  Metronidazole:  - current  Doxycycline:  - current    Objective   Last 24 Hour Vital Signs:  BP  Min: 82/52  Max: 139/68  Temp  Av.9 °F (36.6 °C)  Min: 97.4 °F (36.3 °C)  Max: 98.4 °F (36.9 °C)  Pulse  Av.1  Min: 74  Max: 97  Resp  Av.4  Min: 13  Max: 40  SpO2  Av.4 %  Min: 86 %  Max: 100 %    Lines, drains, airway:  Trialysis catheter - 7/10  Esparza Catheter -     Physical Examination:  Examination  General: Patient sleepy but easily aroused; lying comfortably in NAD. Obese body habitus.  HEENT: normocephalic, atraumatic, EOMI; no thrush noted  Neck: No thyromegaly or masses   Cardiac: RRR, no murmurs appreciated, no extra heart sounds  Pulmonary/Chest: CTAB, symmetric chest rise, no wheezing or crackles  GI: Soft, tender to palpation, distended, normoactive bowel sounds  Extremities: no edema,  clubbing, or cyanosis  Skin: dry, warm, intact. No bruising or rashes.  Neuro: Alert and oriented    Lab data:  CBC:   Lab Results   Component Value Date    WBC 21.14 (H) 07/21/2019    HGB 9.7 (L) 07/21/2019     07/21/2019    MCV 87 07/21/2019    RDW 17.8 (H) 07/21/2019       Estimated Creatinine Clearance: 20.6 mL/min (A) (based on SCr of 3 mg/dL (H)).    Microbiology Data  Blood culture (7/11) - negative  Blood culture (7/19) - negative  Respiratory culture - pending  C Diff - negative    Radiology:  CT Abdomen/Pelvis (7/18)  The liver, spleen, gallbladder appear normal.  There is a small amount of perihepatic fluid.  The pancreas and adrenal glands are unremarkable.  There is no hydronephrosis.  Multiple cysts are present in the left kidney.  There is an approximately 15 x 4 cm hematoma within the lesser sac.  Within this hematoma there are areas of increased attenuation suggesting active extravasation.  There is moderate fat stranding and hemorrhage in the region of the hepatic flexure and thickening of the right lateral conal fascia.  No bowel obstruction.  No free fluid.     CT Chest (7/18)  There is no CT evidence of pulmonary thromboembolism.  No enlarged hilar or mediastinal lymph nodes are seen.  No suspicious pulmonary nodules or masses are noted.  There is an area of pneumonia in the left upper lobe and mild dependent atelectasis in both lower lobes.  There is a trace left pleural fluid collection.    Assessment     Jose Martin Hutchins is a 78 y.o. male with multiple comorbidities including chronic inflammatory demyelinating polyneuritis. He was transferred to [facility] for higher level of care and need for interventional radiology for a retroperitoneal hematoma.     Patient acutely ill due to combination of retroperitoneal hematoma as well as upper lobe pneumonia found on CT chest. White count spike to 30 within the past 2 days and 2 episodes of hypotension. Will cover pneumonia since patient  has risk factors for nosocomial organisms and aspiration (seizure like episode).    Patient has been on prolonged courses of high dose steroids, therefore can be considered immunosuppressed. For this reason, organisms other than classic bacteria should be considered.     Recommendations     Left Upper Lobe Pneumonia  - Continue Vancomycin with goal trough 15-20  - Continue Cefepiime 2g IV q24  - Continue Metronidazole 500mg IV q8h  - Continue Doxycycline 100mg IV q12h for atypical coverage  - Ideally would want bronch for fungal cultures, PJP, and AFB  - Will consider PJP prophylaxis if steroids are to continue  - Recommend Chest X-ray for tomorrow morning    Thank you for the consult. Please call with any questions.    Emma Jean Baptiste MD  [facility] Internal Medicine Resident, -I

## 2019-07-21 NOTE — ASSESSMENT & PLAN NOTE
Pt transferred from [facility] with the concern for intraperitoneal hemorrhage for IR consult.   CT abdomen: Acute approximately 15 x 4 cm hematoma within the region of the lesser sac with signs of active contrast extravasation.  IR consulted, currently no need for embolization at this time  GS consulted, potential for OR if vitals remain unstable  Will continue to monitor H/H

## 2019-07-21 NOTE — PROGRESS NOTES
Mr. Hutchins c/o bilat ear pain that has gotten worse when he swallows. Notified Dr. Boone and Dr. Sousa.

## 2019-07-21 NOTE — PT/OT/SLP EVAL
Occupational Therapy   Evaluation    Name: Jose Martin Hutchins  MRN: [id number]  Admitting Diagnosis:  Intraperitoneal hemorrhage     The primary encounter diagnosis was ANDIE (acute kidney injury). Diagnoses of Intraperitoneal hemorrhage, CIDP (chronic inflammatory demyelinating polyneuropathy), Chronic inflammatory demyelinating polyneuritis, Chest pain, Anemia, unspecified type, Sepsis, due to unspecified organism, Decreased urine output, Type 2 diabetes mellitus with hyperglycemia, without long-term current use of insulin, and Pneumonia of left upper lobe due to infectious organism were also pertinent to this visit.      Recommendations:     Discharge Recommendations: rehabilitation facility  Discharge Equipment Recommendations:  (wc, HB, julio lift on order per wife's report)  Barriers to discharge:  None    Assessment:     Jose Martin Hutchins is a 78 y.o. male with a medical diagnosis of Intraperitoneal hemorrhage.  He presents with debility and total assist with ADLS and mobility. Continue with OT POC. Performance deficits affecting function: weakness, impaired endurance, impaired self care skills, impaired functional mobilty, gait instability, impaired balance, impaired cognition, decreased coordination, decreased upper extremity function, decreased lower extremity function, decreased safety awareness, decreased ROM, impaired coordination, edema, impaired cardiopulmonary response to activity.      Rehab Prognosis: Fair; patient would benefit from acute skilled OT services to address these deficits and reach maximum level of function.       Plan:     Patient to be seen 5 x/week to address the above listed problems via self-care/home management, therapeutic activities, therapeutic exercises  · Plan of Care Expires:    · Plan of Care Reviewed with: patient    Subjective     Chief Complaint: none  Patient/Family Comments/goals: none    Occupational Profile:  Living Environment: lives with wife in [facility] with walk n  shower with bench  Previous level of function: pt was indep-Lynn ~ 6 weeks ago ambulated with walker and performed ADLS  Lynn; total assist except feeding for past 6 weeks.  Roles and Routines: sedentary  Equipment Used at Home:  rollator, walker, rolling, other (see comments)(BSc, transport,  bench. lift chair, )  Assistance upon Discharge: wife, family    Pain/Comfort:  · Pain Rating 1: 5/10  · Location - Side 1: Right  · Location - Orientation 1: midline  · Location 1: abdomen  · Pain Addressed 1: Pre-medicate for activity, Reposition, Distraction, Nurse notified  · Pain Rating Post-Intervention 1: (did not rate)    Patients cultural, spiritual, Denominational conflicts given the current situation: no    Objective:     Communicated with: nurse prior to session.  Patient found HOB elevated with blood pressure cuff, central line, becerril catheter, oxygen, peripheral IV, pulse ox (continuous), SCD, telemetry upon OT entry to room.    General Precautions: Standard, fall, seizure   Orthopedic Precautions:N/A   Braces: N/A     Occupational Performance:    Bed Mobility:    · Patient completed Rolling/Turning to Left with  moderate assistance and with side rail  · Patient completed Rolling/Turning to Right with moderate assistance and with side rail  · Patient completed Scooting/Bridging with total assistance and 2 persons  · Patient completed Supine to Sit with maximal assistance and 2 persons  · Patient completed Sit to Supine with total assist x 2         Activities of Daily Living:  · Feeding:  total assistance .  · Grooming: total assistance .  · Upper Body Dressing: total assistance '  · Lower Body Dressing: total assistance .  · Toileting: total assistance .    Cognitive/Visual Perceptual:  Cognitive/Psychosocial Skills:     -       Oriented to: Person   -       Follows Commands/attention:Inattentive and Follows one-step commands  -       Communication: clear/fluent  -       Memory: Poor immediate recall  -        Safety awareness/insight to disability: impaired   -       Mood/Affect/Coping skills/emotional control: Lethargic  Visual/Perceptual:      -Intact .    Physical Exam:  Balance:    -       sitting:  poor total assist  Postural examination/scapula alignment:    -       Rounded shoulders  -       Forward head  -       Posterior pelvic tilt  Edema:  Moderate B legs  Dominant hand:    -       ight  Upper Extremity Range of Motion:     -       Right Upper Extremity: Deficits: 75% limited AROM  -       Left Upper Extremity: Deficits: AROM limited 75%  Upper Extremity Strength:    -       Right Upper Extremity: Deficits: shld 2+/5 distally 3-/5  -       Left Upper Extremity: Deficits: same   Strength:    -       Right Upper Extremity: Deficits: fair minus  -       Left Upper Extremity: Deficits: same    AMPAC 6 Click ADL:  AMPA Total Score:      Treatment & Education:  Role of OT and POC  Pt. Sat up only 1 min 2/2 dizziness; unable to get BP reading while sitting 2/2 increasing dizziness and had to lie back down in bed.  Supine /63.  Education:    Patient left right sidelying with all lines intact, call button in reach, bed alarm on, nurse notified and family present    GOALS:   Multidisciplinary Problems     Occupational Therapy Goals        Problem: Occupational Therapy Goal    Goal Priority Disciplines Outcome Interventions   Occupational Therapy Goal     OT, PT/OT Ongoing (interventions implemented as appropriate)    Description:  Goals to be met by: 8/20/2019    Patient will increase functional independence with ADLs by performing:    Feeding with Set-up Assistance.  UE Dressing with Minimal Assistance.  LE Dressing with Moderate Assistance.  Grooming while seated with Set-up Assistance.  Toileting from bedside commode with Moderate Assistance for hygiene and clothing management.   Sitting at edge of bed x20 minutes with Supervision.  Rolling to Bilateral with Modified [place].   Supine to sit with  Modified [place].  Step transfer with Minimal Assistance  Toilet transfer to bedside commode with Minimal Assistance.  Increased functional strength to WFL for ADLS.  Upper extremity exercise program 10 reps per handout, with supervision.                      History:     Past Medical History:   Diagnosis Date    A-fib     Coronary artery disease     GERD (gastroesophageal reflux disease)     Hypertension     Kidney function abnormal     Rheumatoid arthritis        Past Surgical History:   Procedure Laterality Date    APPENDECTOMY      CORONARY ARTERY BYPASS GRAFT (CABG)      3 vessel    HERNIA REPAIR      INSERTION, CATHETER, VASCULAR, DUAL LUMEN N/A 7/10/2019    Performed by Jovan Montilla MD at [facility] ENDO    MULTIPLE TOOTH EXTRACTIONS  2019    3 teeth    TONSILLECTOMY         Time Tracking:     OT Date of Treatment: 07/20/19  OT Start Time: 1141  OT Stop Time: 1207  OT Total Time (min): 26 min    Billable Minutes:Evaluation 15  Total Time 15    Loyda Barron OT  7/20/2019

## 2019-07-21 NOTE — PLAN OF CARE
Problem: Occupational Therapy Goal  Goal: Occupational Therapy Goal  Goals to be met by: 8/20/2019    Patient will increase functional independence with ADLs by performing:    Feeding with Set-up Assistance.  UE Dressing with Minimal Assistance.  LE Dressing with Moderate Assistance.  Grooming while seated with Set-up Assistance.  Toileting from bedside commode with Moderate Assistance for hygiene and clothing management.   Sitting at edge of bed x20 minutes with Supervision.  Rolling to Bilateral with Modified [place].   Supine to sit with Modified [place].  Step transfer with Minimal Assistance  Toilet transfer to bedside commode with Minimal Assistance.  Increased functional strength to WFL for ADLS.  Upper extremity exercise program 10 reps per handout, with supervision.    Outcome: Ongoing (interventions implemented as appropriate)  OT initial eval completed. Pt. Presents with decline in ADL and fx mobility-requires total assist with ADLS and mobility.  Pt. Would benefit from IPR.  Continue with OT POC.

## 2019-07-21 NOTE — PLAN OF CARE
Problem: Adult Inpatient Plan of Care  Goal: Plan of Care Review  Outcome: Ongoing (interventions implemented as appropriate)  Patient's urine output improving tonight with added Lasix dose. Patients mental status also improving, having full conversations. However, patient experiences excruciating pain with any movement and palpation to abdomen. Patient does not ask for pain medicine but I feel this is necessary to get him moving with PT/OT. H/H stable this morning. Remains on 1L NC. Magic Mouthwash at bedside and used 2x but patient explains that the lidocaine irritates mouth sores. Patient states he wants to eat, so maybe he will be able to tolerate a mechanical soft or pureed diet today. Wife at bedside and updated with POC. See flowsheet for details. Will cont to monitor.

## 2019-07-21 NOTE — PLAN OF CARE
"Problem: SLP Goal  Goal: SLP Goal  Short Term Goals:  1. Pt will participate in BSS to determine least restrictive diet.--ongoing  2. Pt will tolerate clear liquids PO diet with no overt s/s of aspiration. --MET 7/21  3. Pt will safely consume full liquid diet w/ thin liquids w/ no overt s/s of aspiration.     Outcome: Ongoing (interventions implemented as appropriate)  7/21/19: Pt seen this PM for ongoing clinical swallow eval. Family and RN report poor PO intake 2/2 complaints of odynophagia and oral cavity pain. Pt with limited participation in PO trials 2/2 pt's c/o abdominal pain/cramping and "burning mouth" sensation. Pt consuming thin liquids and puree consistencies without clinical s/s of aspiration. SLP recs: Full Thin Liquid Diet with universal swallow precautions (upright at 90 degrees, alternate sips/bites, 1 bite/sip at a time, remain upright for 30 mins after meals, whole meds 1 by, etc.) SLP will continue to follow to advance diet as tolerated.   JOSÉ LUIS Suazo. CCC-SLP  Speech-Language Pathologist       "

## 2019-07-21 NOTE — PLAN OF CARE
Problem: Adult Inpatient Plan of Care  Goal: Plan of Care Review  Outcome: Revised  H/H remain stable. No signs of bleeding noted. Mr. Hutchins did not work with PT/OT today d/t pain. Morphine IVP and PO pain med given with some relief of pain. He is advanced to a full liquid diet. BG checked every 6 hours. Bed alarm remains on. Family has been at his bedside for the majority of the day. Call bell remains in reach. He agreed to being turned once pain meds started to work. He remains on IV antibiotics. Will cont to monitor and will report off to oncoming nurse to assume care.

## 2019-07-22 LAB
ALBUMIN SERPL BCP-MCNC: 2.9 G/DL (ref 3.5–5.2)
ALP SERPL-CCNC: 66 U/L (ref 55–135)
ALT SERPL W/O P-5'-P-CCNC: 39 U/L (ref 10–44)
ANION GAP SERPL CALC-SCNC: 12 MMOL/L (ref 8–16)
APTT BLDCRRT: 44.2 SEC (ref 21–32)
APTT BLDCRRT: 44.2 SEC (ref 21–32)
AST SERPL-CCNC: 56 U/L (ref 10–40)
BASOPHILS # BLD AUTO: ABNORMAL K/UL (ref 0–0.2)
BASOPHILS NFR BLD: 0 % (ref 0–1.9)
BILIRUB SERPL-MCNC: 0.7 MG/DL (ref 0.1–1)
BUN SERPL-MCNC: 53 MG/DL (ref 8–23)
CALCIUM SERPL-MCNC: 8.1 MG/DL (ref 8.7–10.5)
CHLORIDE SERPL-SCNC: 91 MMOL/L (ref 95–110)
CO2 SERPL-SCNC: 28 MMOL/L (ref 23–29)
CREAT SERPL-MCNC: 2.4 MG/DL (ref 0.5–1.4)
DIFFERENTIAL METHOD: ABNORMAL
EOSINOPHIL # BLD AUTO: ABNORMAL K/UL (ref 0–0.5)
EOSINOPHIL NFR BLD: 0 % (ref 0–8)
ERYTHROCYTE [DISTWIDTH] IN BLOOD BY AUTOMATED COUNT: 17.8 % (ref 11.5–14.5)
EST. GFR  (AFRICAN AMERICAN): 29 ML/MIN/1.73 M^2
EST. GFR  (NON AFRICAN AMERICAN): 25 ML/MIN/1.73 M^2
FIBRINOGEN PPP-MCNC: 226 MG/DL (ref 182–366)
GLUCOSE SERPL-MCNC: 161 MG/DL (ref 70–110)
HCT VFR BLD AUTO: 26.6 % (ref 40–54)
HGB BLD-MCNC: 9 G/DL (ref 14–18)
INR PPP: 1.2 (ref 0.8–1.2)
INR PPP: 1.2 (ref 0.8–1.2)
LYMPHOCYTES # BLD AUTO: ABNORMAL K/UL (ref 1–4.8)
LYMPHOCYTES NFR BLD: 10 % (ref 18–48)
MAGNESIUM SERPL-MCNC: 1.7 MG/DL (ref 1.6–2.6)
MCH RBC QN AUTO: 29.5 PG (ref 27–31)
MCHC RBC AUTO-ENTMCNC: 33.8 G/DL (ref 32–36)
MCV RBC AUTO: 87 FL (ref 82–98)
MONOCYTES # BLD AUTO: ABNORMAL K/UL (ref 0.3–1)
MONOCYTES NFR BLD: 1 % (ref 4–15)
NEUTROPHILS # BLD AUTO: ABNORMAL K/UL (ref 1.8–7.7)
NEUTROPHILS NFR BLD: 88 % (ref 38–73)
NEUTS BAND NFR BLD MANUAL: 1 %
PHOSPHATE SERPL-MCNC: 4.5 MG/DL (ref 2.7–4.5)
PLATELET # BLD AUTO: 147 K/UL (ref 150–350)
PLATELET BLD QL SMEAR: ABNORMAL
PMV BLD AUTO: 8.5 FL (ref 9.2–12.9)
POCT GLUCOSE: 125 MG/DL (ref 70–110)
POCT GLUCOSE: 139 MG/DL (ref 70–110)
POCT GLUCOSE: 160 MG/DL (ref 70–110)
POCT GLUCOSE: 186 MG/DL (ref 70–110)
POCT GLUCOSE: 197 MG/DL (ref 70–110)
POCT GLUCOSE: 256 MG/DL (ref 70–110)
POTASSIUM SERPL-SCNC: 3 MMOL/L (ref 3.5–5.1)
PROT SERPL-MCNC: 5.1 G/DL (ref 6–8.4)
PROTHROMBIN TIME: 12 SEC (ref 9–12.5)
PROTHROMBIN TIME: 12 SEC (ref 9–12.5)
RBC # BLD AUTO: 3.05 M/UL (ref 4.6–6.2)
SODIUM SERPL-SCNC: 131 MMOL/L (ref 136–145)
VANCOMYCIN TROUGH SERPL-MCNC: 22.6 UG/ML (ref 10–22)
WBC # BLD AUTO: 14.87 K/UL (ref 3.9–12.7)

## 2019-07-22 PROCEDURE — 99231 SBSQ HOSP IP/OBS SF/LOW 25: CPT | Mod: ,,, | Performed by: SURGERY

## 2019-07-22 PROCEDURE — 97110 THERAPEUTIC EXERCISES: CPT

## 2019-07-22 PROCEDURE — 93010 EKG 12-LEAD: ICD-10-PCS | Mod: ,,, | Performed by: INTERNAL MEDICINE

## 2019-07-22 PROCEDURE — 93010 ELECTROCARDIOGRAM REPORT: CPT | Mod: ,,, | Performed by: INTERNAL MEDICINE

## 2019-07-22 PROCEDURE — 63600175 PHARM REV CODE 636 W HCPCS: Performed by: FAMILY MEDICINE

## 2019-07-22 PROCEDURE — 63600175 PHARM REV CODE 636 W HCPCS: Performed by: STUDENT IN AN ORGANIZED HEALTH CARE EDUCATION/TRAINING PROGRAM

## 2019-07-22 PROCEDURE — 97530 THERAPEUTIC ACTIVITIES: CPT

## 2019-07-22 PROCEDURE — 85007 BL SMEAR W/DIFF WBC COUNT: CPT

## 2019-07-22 PROCEDURE — 25000003 PHARM REV CODE 250: Performed by: STUDENT IN AN ORGANIZED HEALTH CARE EDUCATION/TRAINING PROGRAM

## 2019-07-22 PROCEDURE — 25000003 PHARM REV CODE 250: Performed by: INTERNAL MEDICINE

## 2019-07-22 PROCEDURE — 92526 ORAL FUNCTION THERAPY: CPT

## 2019-07-22 PROCEDURE — 80202 ASSAY OF VANCOMYCIN: CPT

## 2019-07-22 PROCEDURE — 25000003 PHARM REV CODE 250: Performed by: FAMILY MEDICINE

## 2019-07-22 PROCEDURE — 87529 HSV DNA AMP PROBE: CPT

## 2019-07-22 PROCEDURE — 85384 FIBRINOGEN ACTIVITY: CPT

## 2019-07-22 PROCEDURE — 84100 ASSAY OF PHOSPHORUS: CPT

## 2019-07-22 PROCEDURE — 94761 N-INVAS EAR/PLS OXIMETRY MLT: CPT

## 2019-07-22 PROCEDURE — S0030 INJECTION, METRONIDAZOLE: HCPCS | Performed by: INTERNAL MEDICINE

## 2019-07-22 PROCEDURE — 85610 PROTHROMBIN TIME: CPT

## 2019-07-22 PROCEDURE — S0030 INJECTION, METRONIDAZOLE: HCPCS | Performed by: STUDENT IN AN ORGANIZED HEALTH CARE EDUCATION/TRAINING PROGRAM

## 2019-07-22 PROCEDURE — 85730 THROMBOPLASTIN TIME PARTIAL: CPT

## 2019-07-22 PROCEDURE — 83735 ASSAY OF MAGNESIUM: CPT

## 2019-07-22 PROCEDURE — 97802 MEDICAL NUTRITION INDIV IN: CPT

## 2019-07-22 PROCEDURE — 80053 COMPREHEN METABOLIC PANEL: CPT

## 2019-07-22 PROCEDURE — 85027 COMPLETE CBC AUTOMATED: CPT

## 2019-07-22 PROCEDURE — S0028 INJECTION, FAMOTIDINE, 20 MG: HCPCS | Performed by: FAMILY MEDICINE

## 2019-07-22 PROCEDURE — 36415 COLL VENOUS BLD VENIPUNCTURE: CPT

## 2019-07-22 PROCEDURE — 93005 ELECTROCARDIOGRAM TRACING: CPT

## 2019-07-22 PROCEDURE — 99231 PR SUBSEQUENT HOSPITAL CARE,LEVL I: ICD-10-PCS | Mod: ,,, | Performed by: SURGERY

## 2019-07-22 PROCEDURE — 63600175 PHARM REV CODE 636 W HCPCS: Performed by: INTERNAL MEDICINE

## 2019-07-22 PROCEDURE — 11000001 HC ACUTE MED/SURG PRIVATE ROOM

## 2019-07-22 RX ORDER — FUROSEMIDE 10 MG/ML
20 INJECTION INTRAMUSCULAR; INTRAVENOUS DAILY
Status: DISCONTINUED | OUTPATIENT
Start: 2019-07-23 | End: 2019-07-26

## 2019-07-22 RX ORDER — POTASSIUM CHLORIDE 7.45 MG/ML
10 INJECTION INTRAVENOUS ONCE
Status: DISCONTINUED | OUTPATIENT
Start: 2019-07-22 | End: 2019-07-22

## 2019-07-22 RX ORDER — VANCOMYCIN HCL IN 5 % DEXTROSE 1G/250ML
1000 PLASTIC BAG, INJECTION (ML) INTRAVENOUS
Status: DISCONTINUED | OUTPATIENT
Start: 2019-07-22 | End: 2019-07-24

## 2019-07-22 RX ORDER — RAMELTEON 8 MG/1
8 TABLET ORAL NIGHTLY PRN
Status: DISCONTINUED | OUTPATIENT
Start: 2019-07-22 | End: 2019-07-29 | Stop reason: HOSPADM

## 2019-07-22 RX ORDER — POTASSIUM CHLORIDE 20 MEQ/15ML
20 SOLUTION ORAL ONCE
Status: DISCONTINUED | OUTPATIENT
Start: 2019-07-22 | End: 2019-07-22

## 2019-07-22 RX ORDER — POTASSIUM CHLORIDE 14.9 MG/ML
20 INJECTION INTRAVENOUS ONCE
Status: COMPLETED | OUTPATIENT
Start: 2019-07-22 | End: 2019-07-22

## 2019-07-22 RX ORDER — CLOTRIMAZOLE 10 MG/1
10 LOZENGE ORAL; TOPICAL
Status: DISCONTINUED | OUTPATIENT
Start: 2019-07-22 | End: 2019-07-28

## 2019-07-22 RX ORDER — POTASSIUM CHLORIDE 20 MEQ/15ML
40 SOLUTION ORAL ONCE
Status: DISCONTINUED | OUTPATIENT
Start: 2019-07-22 | End: 2019-07-22

## 2019-07-22 RX ADMIN — CLOTRIMAZOLE 10 MG: 10 LOZENGE ORAL; TOPICAL at 08:07

## 2019-07-22 RX ADMIN — FAMOTIDINE 20 MG: 10 INJECTION, SOLUTION INTRAVENOUS at 09:07

## 2019-07-22 RX ADMIN — POTASSIUM CHLORIDE 20 MEQ: 200 INJECTION, SOLUTION INTRAVENOUS at 11:07

## 2019-07-22 RX ADMIN — LACTOBACILLUS TAB 1 TABLET: TAB at 05:07

## 2019-07-22 RX ADMIN — FUROSEMIDE 20 MG: 10 INJECTION, SOLUTION INTRAMUSCULAR; INTRAVENOUS at 09:07

## 2019-07-22 RX ADMIN — DULOXETINE 30 MG: 30 CAPSULE, DELAYED RELEASE ORAL at 09:07

## 2019-07-22 RX ADMIN — INSULIN DETEMIR 12 UNITS: 100 INJECTION, SOLUTION SUBCUTANEOUS at 08:07

## 2019-07-22 RX ADMIN — NYSTATIN 500000 UNITS: 100000 SUSPENSION ORAL at 09:07

## 2019-07-22 RX ADMIN — CLOTRIMAZOLE 10 MG: 10 LOZENGE ORAL; TOPICAL at 02:07

## 2019-07-22 RX ADMIN — RAMELTEON 8 MG: 8 TABLET, FILM COATED ORAL at 12:07

## 2019-07-22 RX ADMIN — METRONIDAZOLE 500 MG: 500 INJECTION, SOLUTION INTRAVENOUS at 02:07

## 2019-07-22 RX ADMIN — LACTOBACILLUS TAB 1 TABLET: TAB at 09:07

## 2019-07-22 RX ADMIN — NYSTATIN 500000 UNITS: 100000 SUSPENSION ORAL at 12:07

## 2019-07-22 RX ADMIN — FOLIC ACID 1 MG: 1 TABLET ORAL at 09:07

## 2019-07-22 RX ADMIN — INSULIN ASPART 1 UNITS: 100 INJECTION, SOLUTION INTRAVENOUS; SUBCUTANEOUS at 08:07

## 2019-07-22 RX ADMIN — HYDROCODONE BITARTRATE AND ACETAMINOPHEN 1 TABLET: 5; 325 TABLET ORAL at 12:07

## 2019-07-22 RX ADMIN — CLOTRIMAZOLE 10 MG: 10 LOZENGE ORAL; TOPICAL at 07:07

## 2019-07-22 RX ADMIN — VANCOMYCIN HYDROCHLORIDE 1000 MG: 1 INJECTION, POWDER, LYOPHILIZED, FOR SOLUTION INTRAVENOUS at 05:07

## 2019-07-22 RX ADMIN — LACTOBACILLUS TAB 1 TABLET: TAB at 12:07

## 2019-07-22 RX ADMIN — METRONIDAZOLE 500 MG: 500 INJECTION, SOLUTION INTRAVENOUS at 07:07

## 2019-07-22 RX ADMIN — DOXYCYCLINE 100 MG: 100 INJECTION, POWDER, LYOPHILIZED, FOR SOLUTION INTRAVENOUS at 03:07

## 2019-07-22 RX ADMIN — CLOTRIMAZOLE 10 MG: 10 LOZENGE ORAL; TOPICAL at 09:07

## 2019-07-22 RX ADMIN — METRONIDAZOLE 500 MG: 500 INJECTION, SOLUTION INTRAVENOUS at 10:07

## 2019-07-22 RX ADMIN — DOCUSATE SODIUM 100 MG: 100 CAPSULE, LIQUID FILLED ORAL at 08:07

## 2019-07-22 RX ADMIN — CEFEPIME 2 G: 2 INJECTION, POWDER, FOR SOLUTION INTRAVENOUS at 09:07

## 2019-07-22 RX ADMIN — TAMSULOSIN HYDROCHLORIDE 0.4 MG: 0.4 CAPSULE ORAL at 09:07

## 2019-07-22 RX ADMIN — PREDNISONE 15 MG: 10 TABLET ORAL at 09:07

## 2019-07-22 NOTE — PLAN OF CARE
Problem: Oral Intake Inadequate  Goal: Improved Oral Intake  Outcome: Ongoing (interventions implemented as appropriate)    Recommendation:   1. Add Boost Plus bid. D/C Boost Breeze.   2. Add Boost pudding.   3. Encourage intake at meals as tolerated.    Goals:   Pt will consume at least 50-75% intake at meals  Nutrition Goal Status: new  Communication of RD Recs: reviewed with RN

## 2019-07-22 NOTE — ASSESSMENT & PLAN NOTE
Consult Neuro and appreciate rec  Currently on Prednisone 15 mg  Consider IVIG once more hemodynamically stable.

## 2019-07-22 NOTE — ASSESSMENT & PLAN NOTE
Unclear etiology, concern for Pneumonia  WBC on admission 28 and patient was on pressors for hypotension  Blood cultures from outside hospital: NGTD  ID consulted: Continue Cefepime, Flagyl, Vancomycin, Doxycycline with recommendation for Pulm Consult for Bronchoscopy  Consulted Pulm  7/19 Blood cultures: NGTD  WBC downtrending to 14

## 2019-07-22 NOTE — PLAN OF CARE
"Problem: SLP Goal  Goal: SLP Goal  Short Term Goals:  1. Pt will participate in BSS to determine least restrictive diet.--ongoing  2. Pt will tolerate clear liquids PO diet with no overt s/s of aspiration. --MET 7/21  3. Pt will safely consume full liquid diet w/ thin liquids w/ no overt s/s of aspiration.  4. Pt will tolerate pureed trials with no outward s/s of dysphagia.   5. Rec: ENT consult and dietician consult to ensure added nutritional support.       Outcome: Ongoing (interventions implemented as appropriate)  7/22/19: Pt seen this AM for ongoing PO trials and possible diet upgrade. Family and RN report poor PO intake 2/2 complaints of odynophagia and oral cavity pain. Pt with limited participation in PO trials 2/2 pt's c/o abdominal pain/cramping and "burning mouth" sensation. Pt consuming thin liquids and puree consistencies without clinical s/s of aspiration. SLP recs: PUREED diet with continued oral supplements (thin liquids, boost breeze supplements). Continue universal swallow precautions (upright at 90 degrees, alternate sips/bites, 1 bite/sip at a time, remain upright for 30 mins after meals, whole meds 1 by 1. SLP will continue to follow to advance diet as tolerated. SLP spoke with primary team re: Dietician consult and ENT consult to evaluate painful swallowing.   HANNAH Sotelo, [facility]-SLP  Speech Pathologist 7/22/2019      "

## 2019-07-22 NOTE — ASSESSMENT & PLAN NOTE
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 Our service is consulted for bronchoscopy with JAIMIE findings on CXR/CT in the setting of chronic immunosuppression.     - Reviewed imaging studies. DDx likely from aspiration pneumonia (although uncommon location). Fungal (e.g PJP) in the setting of immunosuppression could be on differential.  - Plan to proceed with bronchoscopy tomorrow on 7/23/19. Will need NPO at midnight. Will discuss with anesthesia re: bronch with mouth piece.   - On board abx - vanc, cefepime, Flaygl, doxycycline. ID following. Consider de-escalate when appropriate (first vancomycin).      None

## 2019-07-22 NOTE — PT/OT/SLP PROGRESS
"Speech Language Pathology   Dysphagia Treatment    Patient Name:  Jose Martin Hutchins   MRN:  [id number]  Admitting Diagnosis: Intraperitoneal hemorrhage    Recommendations:                 General Recommendations:  ENT evaluation, Dysphagia therapy and dietician consult  Diet recommendations:  Puree, Liquid Diet Level: Thin   Aspiration Precautions: 1 bite/sip at a time, Alternating bites/sips, Assistance with meals, Avoid talking while eating, Check for pocketing/oral residue, Eliminate distractions, Feed only when awake/alert, Frequent oral care, HOB to 90 degrees, Meds whole 1 at a time, Monitor for s/s of aspiration, Remain upright 30 minutes post meal, Small bites/sips and Standard aspiration precautions   General Precautions: Standard, fall      Subjective     Pt seen at bedside for possible diet upgrade. Pt's sister both at bedside and report overall dcr'd PO intake. Pt reported pain when swallowing and consumed liquids during AM tray.   Patient goals: "Everything hurts when I eat."    Pain/Comfort:  · Pain Rating 1: 4/10  · Location 1: (mouth pain reported)    Objective:     Has the patient been evaluated by SLP for swallowing?   Yes  Keep patient NPO? No   Current Respiratory Status: room air      Swallowing: Pt observed with full liquid tray in room. He is alert and awake. Pt reports he has pain when swallowing. Pt consumed small sips of broth via cup (no coughing, no choking or change in voice). Pt with overt mouth sores along inside cheek, upper lip, alveolar ridge, sides of tongue). Pt consumed bites of applesauce x4 with no outward signs of pharyngeal dysphagia. Pt with limited intake overall. Pt did require verbal cues to take in more PO trials.   Pt also with hoarse, breathy voice noted during speech tasks. Sisters reported weight loss, pain during swallow s/p extraction of teeth back in May 2019. Pt being followed by infection diseases.   Pt is requesting mashed potatoes but no gravy.  Will speak with " "Dietician and team re: diet upgrade; however,  Pt with specific requests for dietary textures and preferences. SLP feels that pt MAY not be able to maintain oral nutrition via current pureed/full liquid diet at this time given (oral cavity pain and sores found in oral cavity).   SLP spoke with primary team resident placing dietician consult and ENT consult. Pt to have bronch next date, 7/23 per ICU rounds.       Assessment:     Jose Martin Hutchins is a 78 y.o. male presents with poor PO intake 2/2 complaints of odynophagia and oral cavity pain. Pt with limited participation in PO trials 2/2 pt's c/o abdominal pain/cramping and "burning mouth" sensation. Pt consuming thin liquids and puree consistencies without clinical s/s of aspiration or airway threat. SLP recs: PUREED diet and thin liquids with oral supplements. Continue  universal swallow precautions (upright at 90 degrees, alternate sips/bites, 1 bite/sip at a time, remain upright for 30 mins after meals, whole meds 1 by, etc.) SLP will continue to follow to advance diet as tolerated.    Goals:   Multidisciplinary Problems     SLP Goals        Problem: SLP Goal    Goal Priority Disciplines Outcome   SLP Goal     SLP Ongoing (interventions implemented as appropriate)   Description:  Short Term Goals:  1. Pt will participate in BSS to determine least restrictive diet.--ongoing  2. Pt will tolerate clear liquids PO diet with no overt s/s of aspiration. --MET 7/21  3. Pt will safely consume full liquid diet w/ thin liquids w/ no overt s/s of aspiration.  4. Pt will tolerate pureed trials with no outward s/s of dysphagia.   5. Rec: ENT consult and dietician consult to ensure added nutritional support.                         Plan:     · Patient to be seen:  3 x/week   · Plan of Care expires:  08/19/19  · Plan of Care reviewed with:  patient(sisters at bedside)   · SLP Follow-Up:  Yes       Discharge recommendations:  (TBD) pending PT and OT recs.       Time " Tracking:     SLP Treatment Date:   07/22/19  Speech Start Time:  1052  Speech Stop Time:  1104     Speech Total Time (min):  12 min    Billable Minutes: Treatment Swallowing Dysfunction 12        HANNAH Sotelo, CCC-SLP  07/22/2019

## 2019-07-22 NOTE — SUBJECTIVE & OBJECTIVE
Interval History: Patient is doing well today. Patient feels better today than yesterday. Patient continues to have dry mouth. Patient was scheduled for bronchoscopy this morning for fungal cultures but was rescheduled until tomorrow. Denies any fever, chills, SOB, chest pain, nausea or vomiting.     Review of Systems   Constitutional: Negative for activity change, appetite change, chills, fatigue and fever.   Respiratory: Negative for cough, choking, shortness of breath and wheezing.    Cardiovascular: Negative for chest pain and leg swelling.   Gastrointestinal: Positive for constipation. Negative for abdominal pain, blood in stool, diarrhea and nausea.   Skin: Negative for rash and wound.   Neurological: Positive for weakness. Negative for dizziness, light-headedness and headaches.     Objective:     Vital Signs (Most Recent):  Temp: 97.6 °F (36.4 °C) (07/22/19 0703)  Pulse: 82 (07/22/19 0806)  Resp: 18 (07/22/19 0806)  BP: 127/69 (07/22/19 0806)  SpO2: (!) 93 % (07/22/19 0806) Vital Signs (24h Range):  Temp:  [97.5 °F (36.4 °C)-98 °F (36.7 °C)] 97.6 °F (36.4 °C)  Pulse:  [81-94] 82  Resp:  [12-46] 18  SpO2:  [90 %-99 %] 93 %  BP: ()/(56-76) 127/69     Weight: 77.1 kg (170 lb)  Body mass index is 25.1 kg/m².    Intake/Output Summary (Last 24 hours) at 7/22/2019 0914  Last data filed at 7/22/2019 0700  Gross per 24 hour   Intake 1000 ml   Output 2995 ml   Net -1995 ml      Physical Exam   Constitutional: He is oriented to person, place, and time. He appears well-developed.   HENT:   Head: Normocephalic and atraumatic.   Mouth/Throat: No oropharyngeal exudate.   Eyes: EOM are normal. Right eye exhibits no discharge. Left eye exhibits no discharge. No scleral icterus.   Neck: Normal range of motion. No thyromegaly present.   Cardiovascular: Normal rate, regular rhythm, normal heart sounds and intact distal pulses. Exam reveals no gallop and no friction rub.   No murmur heard.  Trialysis Cath in the left  subclavian    Pulmonary/Chest: Effort normal and breath sounds normal. No stridor. No respiratory distress. He has no wheezes. He has no rales. He exhibits no tenderness.   Abdominal: Soft. Bowel sounds are normal. He exhibits no distension. There is tenderness. There is no guarding.   Genitourinary:   Genitourinary Comments: Esparza in place   Musculoskeletal: He exhibits no edema or deformity.   Neurological: He is alert and oriented to person, place, and time.   Alert and oriented to self, place, and time.   Strength: 3/5 in bilateral upper extremities; 2/5 in the bilateral lower extremities   Skin: Skin is warm. No rash noted. No erythema.   Nursing note and vitals reviewed.      Significant Labs:   Blood Culture: No results for input(s): LABBLOO in the last 48 hours.  CBC:   Recent Labs   Lab 07/20/19  1141 07/21/19  0410 07/22/19  0430   WBC  --  21.14* 14.87*   HGB 7.9* 9.7* 9.0*   HCT 24.1* 28.8* 26.6*   PLT  --  156 147*     CMP:   Recent Labs   Lab 07/20/19  1141 07/21/19  0410 07/22/19  0430   * 131* 131*   K 4.9 3.8 3.0*   CL 92* 92* 91*   CO2 24 26 28   * 86 161*   BUN 62* 60* 53*   CREATININE 3.3* 3.0* 2.4*   CALCIUM 8.0* 8.2* 8.1*   PROT  --  5.5* 5.1*   ALBUMIN  --  3.3* 2.9*   BILITOT  --  0.7 0.7   ALKPHOS  --  66 66   AST  --  57* 56*   ALT  --  41 39   ANIONGAP 12 13 12   EGFRNONAA 17* 19* 25*     Magnesium:   Recent Labs   Lab 07/21/19  0410 07/22/19  0430   MG 1.9 1.7       Significant Imaging:     Chest X-ray: Medical support devices project in stable radiographic position.  Cardiomediastinal silhouette is unchanged.  There are low lung volumes bilaterally with elevation of the right hemidiaphragm.  There is continued patchy consolidation projecting over the left upper lung zone.  No definite new confluent airspace consolidation or pneumothorax appreciated.

## 2019-07-22 NOTE — ASSESSMENT & PLAN NOTE
Continue Insulin determir 12 units QHS along with moderate dose correcting scale.   POCT glucose QID  AM glucose goal < 180  Will continue to monitor.

## 2019-07-22 NOTE — PROGRESS NOTES
"[facility]  Neurology  Progress Note    Patient Name: Jose Martin Hutchins  MRN: [id number]  Admission Date: 7/19/2019  Hospital Length of Stay: 3 days  Code Status: Full Code   Attending Provider: Dr. Rhys Jones  Primary Care Physician: Sam Washington MD   Principal Problem:Intraperitoneal hemorrhage    Subjective: Jose Martin Hutchins is a 78 y.o. 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 Admitted originally for elective plasmapheresis. Family reports significant improvement following initial 2 plasma exchange treatments, before he decompensated again due to internal bleeding +/- underlying infection. Upon arrival to unit patient was noted to have gaze deviation and was briefly unresponsive when transferred from stretcher to bed, his SBP was in the 70s at the time, neurology consulted for possible seizure activity. EEG performed 7/19/19 suggestive of moderate diffuse or multifocal cerebral dysfunction but no epileptiform activity seen.      Interval History: No acute events overnight. Per family and pt, he is more alert, but not oriented to time, date, place, however improving mentation. Pt c/o dysphagia, odynophagia, "burning" in his throat when swallowing. Pt otherwise has no complaints. Pt able to work with PT/OT. Pt states he has lower extremity weakness and b/l LE numbness, that is not new. Pt denies f/c/s/n/v//d, chest pain, sob, headache, dizziness, vision change.      Current Facility-Administered Medications   Medication Dose Route Frequency Provider Last Rate Last Dose    0.9%  NaCl infusion (for blood administration)   Intravenous Q24H PRN Mary Lou Gee MD        0.9%  NaCl infusion (for blood " administration)   Intravenous Q24H PRN Mary Lou Gee MD        acetaminophen tablet 650 mg  650 mg Oral Q8H PRN Mary Lou Gee MD   650 mg at 07/20/19 2124    ceFEPIme (MAXIPIME) 2 g in dextrose 5 % 50 mL IVPB  2 g Intravenous Daily Mary Lou Gee  mL/hr at 07/22/19 0904 2 g at 07/22/19 0904    dextrose 50 % in water (D50W) injection 12.5 g  12.5 g Intravenous PRN Mary Lou Gee MD        dextrose 50 % in water (D50W) injection 25 g  25 g Intravenous PRN Mary Lou Gee MD        docusate sodium capsule 100 mg  100 mg Oral BID Mary Lou Gee MD   100 mg at 07/21/19 2055    doxycycline (VIBRAMYCIN) 100mg in dextrose 5% 250 mL IVPB (ready to mix)  100 mg Intravenous Q12H Mary Lou Gee  mL/hr at 07/22/19 0353 100 mg at 07/22/19 0353    DULoxetine DR capsule 30 mg  30 mg Oral Daily Mary Lou Gee MD   30 mg at 07/22/19 0904    famotidine (PF) injection 20 mg  20 mg Intravenous Daily Mary Lou Gee MD   20 mg at 07/22/19 0905    folic acid tablet 1 mg  1 mg Oral Daily Mary Lou Gee MD   1 mg at 07/22/19 0905    furosemide injection 20 mg  20 mg Intravenous BID Mary Lou Gee MD   20 mg at 07/22/19 0905    glucagon (human recombinant) injection 1 mg  1 mg Intramuscular PRN Mary Lou Gee MD        glucose chewable tablet 16 g  16 g Oral PRN Mary Lou Gee MD        glucose chewable tablet 16 g  16 g Oral PRN Mary Lou Gee MD        glucose chewable tablet 24 g  24 g Oral PRN Mary Lou Gee MD        glucose chewable tablet 24 g  24 g Oral PRN Mary Lou Gee MD        HYDROcodone-acetaminophen 5-325 mg per tablet 1 tablet  1 tablet Oral Q6H PRN Mary Lou Gee MD   1 tablet at 07/21/19 1538    insulin aspart U-100 pen 0-5 Units  0-5 Units Subcutaneous QID (AC + HS) PRN Mary Lou Gee MD        insulin detemir U-100 pen 12 Units  12 Units Subcutaneous QHS Mary Lou Gee MD   12 Units at 07/21/19 2056    Lactobacillus acidoph-L.bulgar 1 million cell  tablet 1 tablet  1 tablet Oral TID  Mary Lou Gee MD   1 tablet at 07/22/19 0904    magic mouthwash (diphenhydrAMINE 12.5 mg/5 mL 20 mL, aluminum & magnesium hydroxide-simethicone (MYLANTA) 20 mL, lidocaine HCl 2% (XYLOCAINE) 20 mL) solution  15 mL Swish & Spit Q4H PRN Mary Lou Gee MD   15 mL at 07/21/19 0435    metronidazole IVPB 500 mg  500 mg Intravenous Q8H Mary Lou Gee  mL/hr at 07/22/19 1025 500 mg at 07/22/19 1025    morphine injection 2 mg  2 mg Intravenous Q4H PRN Mary Lou Gee MD   2 mg at 07/21/19 1027    nitroGLYCERIN SL tablet 0.4 mg  0.4 mg Sublingual Q5 Min PRN Mary Lou Gee MD        nystatin 100,000 unit/mL suspension 500,000 Units  500,000 Units Oral TID  Mary Lou Gee MD   500,000 Units at 07/22/19 0912    polyethylene glycol packet 17 g  17 g Oral BID Mary Lou Gee MD        predniSONE tablet 15 mg  15 mg Oral Daily Mary Lou Gee MD   15 mg at 07/22/19 0903    ramelteon tablet 8 mg  8 mg Oral Nightly PRN Mary Lou Gee MD   8 mg at 07/22/19 0022    sodium chloride 0.9% flush 10 mL  10 mL Intravenous PRN Mary Lou Gee MD        sodium chloride 0.9% flush 10 mL  10 mL Intravenous PRN Mary Lou Gee MD        sodium chloride 0.9% flush 10 mL  10 mL Intravenous PRN Mary Lou Gee MD        tamsulosin 24 hr capsule 0.4 mg  0.4 mg Oral Daily Mary Lou Gee MD   0.4 mg at 07/22/19 0903    vancomycin in dextrose 5 % 1 gram/250 mL IVPB 1,000 mg  1,000 mg Intravenous Q24H Mary Lou Gee MD   1,000 mg at 07/21/19 1027       Review of Systems   Constitutional: Negative.    HENT: Negative.    Eyes: Negative.    Respiratory: Negative.    Cardiovascular: Negative.    Gastrointestinal: Negative.    Genitourinary: Negative.    Musculoskeletal: Negative.    Skin: Negative.    Neurological: Negative.    Psychiatric/Behavioral: Negative.      Objective:     Vital Signs (Most Recent):  Temp: 97.6 °F (36.4 °C) (07/22/19 0703)  Pulse: 78 (07/22/19  1100)  Resp: 19 (07/22/19 1100)  BP: 118/75 (07/22/19 1100)  SpO2: 97 % (07/22/19 1100) Vital Signs (24h Range):  Temp:  [97.5 °F (36.4 °C)-98 °F (36.7 °C)] 97.6 °F (36.4 °C)  Pulse:  [78-94] 78  Resp:  [12-37] 19  SpO2:  [90 %-99 %] 97 %  BP: (101-162)/(56-80) 118/75     Weight: 77.1 kg (170 lb)  Body mass index is 25.1 kg/m².    Physical Exam   Constitutional: He appears well-developed and well-nourished. No distress.   HENT:   Head: Normocephalic and atraumatic.   Nose: Nose normal.   Mouth/Throat: Mucous membranes are dry. Oral lesions present.   Eyes: Pupils are equal, round, and reactive to light. Conjunctivae and EOM are normal.   Neck: Normal range of motion. Neck supple.   Cardiovascular: Normal rate, regular rhythm, normal heart sounds and intact distal pulses.   Pulmonary/Chest: Effort normal and breath sounds normal.   Abdominal: Soft. Bowel sounds are normal.   Neurological: He has an abnormal Finger-Nose-Finger Test.   Reflex Scores:       Patellar reflexes are 1+ on the right side and 1+ on the left side.       Achilles reflexes are 1+ on the right side and 1+ on the left side.  Skin: Skin is warm and dry. He is not diaphoretic.   Psychiatric: He has a normal mood and affect. His speech is normal.       NEUROLOGICAL EXAMINATION:     MENTAL STATUS   Oriented to person.   Disoriented to place. Disoriented to city and area.   Disoriented to time. Disoriented to year.   Attention: normal. Concentration: decreased.   Speech: speech is normal   Level of consciousness: alertPraxis: normal     CRANIAL NERVES     CN II   Visual fields full to confrontation.     CN III, IV, VI   Pupils are equal, round, and reactive to light.  Extraocular motions are normal.   CN III: no CN III palsy  CN VI: no CN VI palsy    CN V   Facial sensation intact.     CN VII   Facial expression full, symmetric.     CN VIII   CN VIII normal.     CN IX, X   CN IX normal.     CN XI   CN XI normal.     CN XII   CN XII normal.     MOTOR  EXAM   Muscle bulk: normal  Overall muscle tone: normal    Strength   Right biceps: 5/5  Left biceps: 5/5  Right triceps: 5/5  Left triceps: 5/5  Right wrist flexion: 5/5  Left wrist flexion: 5/5  Right wrist extension: 5/5  Left wrist extension: 5/5  Right interossei: 5/5  Left interossei: 5/5  Right hamstrin/5  Left hamstrin/5  Right glutei: 2/5  Left glutei: 2/5  Right anterior tibial: 2/5  Left anterior tibial: 2/5  Right posterior tibial: 2/5  Left posterior tibial: 2/5  Right gastroc: 2/5  Left gastroc: 2/5    REFLEXES     Reflexes   Right patellar: 1+  Left patellar: 1+  Right achilles: 1+  Left achilles: 1+    SENSORY EXAM   Right leg light touch: decreased from knee  Left leg light touch: decreased from knee    GAIT AND COORDINATION      Coordination   Finger to nose coordination: abnormal    Tremor   Resting tremor: absent  Intention tremor: present      Significant Labs: All pertinent lab results from the past 24 hours have been reviewed.    Significant Imaging: I have reviewed all pertinent imaging results/findings within the past 24 hours.    Assessment and Plan:     79 yo man w/ hx of HTN, paroxysmal afib, CAD, CIDP, recurrent infections, consulted for possible seizure activity, and admitted for hemodynamic instability d/t retroperitoneal hemorrhage. Pt had brief unresponsiveness associated with acute hypotension. Episode most likely related to hypotension. EEG 19 suggests moderate diffuse or multifocal cerebral dysfunction but no epileptiform activity seen. No further episodes of seizure activity, altered mentation, or LOC.    Acute encephalopathy in setting of hypotension, sepsis, anemia, ANDIE. Transient unresponsiveness.  - acutely hypotensive during episode of transient unresponsiveness. Multiple factors for AMS includes sepsis, hypotension, ANDIE. Currently improving after antibiotics and more stable BPs.  - continue telemetry, q 4 h neurochecks, stat CT head w/ worsening mental  status/decreased LOC  - SBP<180, MAPs> 70; pressors as needed  - BG<180 SSi as needed  - Recommend not starting any AEDs unless he has continued seizure activity while hemodynamically stable  - 2 mg Ativan IV for generalized seizures lasting > 5 min  - Spot EEG w/ diffuse slowing consistent with encephalopathy, no epileptiform activity reported   - metabolic abnormalities/empiric antibiotics per primary; avoid cefepime as this can worsen AMS.  - delirium precautions, limit sedation  -ST consulted, pt with dysphagia, odynophagia  - Consider repeating EEG when metabolic abnormalities/sepsis improve.  -Pt started on Cefepime for left upper lobe pneumonia. If mental status declines we aware of encephalopathy could be an adverse reaction to cefepime. Also cefepime decrease seizure threshold.  -Follow up with outpatient neurology in 2-3 weeks     CIDP  - Diagnosed by outpatient neurologist several months ago per wife  - no improvement to prednisone 60 mg qd previously, admitted for elective PE with significant response per family  - recommend withholding additional plasma exchange therapies given concern for iatrogenic coagulapathy  - continue steroids 15 mg qd  - can consider IVIG while inpatient once clinically stable  - patient will likely benefit from subcutaneous IG as outpatient for continued treatment of CIDP once outside of acute illness     Bilateral upper extremities tremors  -Asymmetric hands tremors R>L, mostly when arms not completely relaxed. Tremor absent at rest.   -Per pt and family has been present for year.   -Seems like essential tremor type tremors. Never treated in the past.   -Defer initiation of any treatment at this time. Could be evaluated as outpatient.    Active Diagnoses:    Diagnosis Date Noted POA    PRINCIPAL PROBLEM:  Intraperitoneal hemorrhage [K66.1]  Unknown    Anemia of chronic disease [D63.8] 07/21/2019 Unknown    Pneumonia of left upper lobe due to infectious organism [J18.1]  Yes     Seizure-like activity [R56.9] 07/19/2019 Unknown    Sepsis [A41.9] 07/19/2019 Unknown    Type 2 diabetes mellitus, without long-term current use of insulin [E11.9] 07/19/2019 Unknown    ANDIE (acute kidney injury) [N17.9] 07/19/2019 Unknown    Pressure injury of right buttock, stage 3 [L89.313] 07/12/2019 Yes    Urinary retention [R33.9] 07/12/2019 Yes    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 Please page neurology resident on call for any further questions. Thank you for consult      Panda Matthews MD  Neurology  [facility]

## 2019-07-22 NOTE — ASSESSMENT & PLAN NOTE
Pt transferred from [facility] with intraperitoneal hemorrhage requesting IR consult for embolization  CT abdomen: Acute approximately 15 x 4 cm hematoma within the region of the lesser sac with signs of active contrast extravasation.  IR consulted, currently no need for embolization at this time  Surgery consulted, potential for OR if vitals remain unstable  Will continue to monitor H/H

## 2019-07-22 NOTE — SUBJECTIVE & OBJECTIVE
Interval History:   Mr. Hutchins is alert and awake today. He is breathing comfortably on room air, and denies any shortness of breath. He denies any change in muscle strength.     Objective:     Vital Signs (Most Recent):  Temp: 97.6 °F (36.4 °C) (07/22/19 0703)  Pulse: 78 (07/22/19 1100)  Resp: 19 (07/22/19 1100)  BP: 118/75 (07/22/19 1100)  SpO2: 97 % (07/22/19 1100) Vital Signs (24h Range):  Temp:  [97.5 °F (36.4 °C)-98 °F (36.7 °C)] 97.6 °F (36.4 °C)  Pulse:  [78-94] 78  Resp:  [12-46] 19  SpO2:  [90 %-99 %] 97 %  BP: ()/(56-80) 118/75     Weight: 77.1 kg (170 lb)  Body mass index is 25.1 kg/m².      Intake/Output Summary (Last 24 hours) at 7/22/2019 1148  Last data filed at 7/22/2019 1104  Gross per 24 hour   Intake 950 ml   Output 3427 ml   Net -2477 ml       Physical Exam   Constitutional: He appears well-developed and well-nourished. No distress.   HENT:   Head: Normocephalic and atraumatic.   Eyes: Conjunctivae and EOM are normal. No scleral icterus.   Cardiovascular: Normal rate, regular rhythm and normal heart sounds.   No murmur heard.  Pulmonary/Chest:   Comfortable on room air. Clear to ascultation to anterior and lateral fields.   Abdominal: Soft. Bowel sounds are normal. There is no tenderness.   Musculoskeletal: He exhibits no edema.   Neurological: He is alert.   3+/5 strength UEs. 2/5 LE.   Skin: Skin is warm. Capillary refill takes less than 2 seconds.            Lines/Drains/Airways     Central Venous Catheter Line                 Trialysis (Dialysis) Catheter 07/10/19 left subclavian 12 days          Drain                 Urethral Catheter 07/11/19 1810 Latex 16 Fr. 10 days                Significant Labs:    CBC/Anemia Profile:  Recent Labs   Lab 07/21/19  0410 07/22/19  0430   WBC 21.14* 14.87*   HGB 9.7* 9.0*   HCT 28.8* 26.6*    147*   MCV 87 87   RDW 17.8* 17.8*        Chemistries:  Recent Labs   Lab 07/21/19  0410 07/22/19  0430   * 131*   K 3.8 3.0*   CL 92* 91*    CO2 26 28   BUN 60* 53*   CREATININE 3.0* 2.4*   CALCIUM 8.2* 8.1*   ALBUMIN 3.3* 2.9*   PROT 5.5* 5.1*   BILITOT 0.7 0.7   ALKPHOS 66 66   ALT 41 39   AST 57* 56*   MG 1.9 1.7   PHOS 5.1* 4.5     No results for input(s): PH, PCO2, PO2, HCO3, POCSATURATED, BE in the last 72 hours.    Microbiology Results (last 7 days)     Procedure Component Value Units Date/Time    Fungus culture [[id number]]     Order Status:  No result Specimen:  Respiratory from BAL, JAIMIE     Culture, Anaerobe [[id number]]     Order Status:  No result Specimen:  Body Fluid from Lung, JAIMIE     Aerobic culture [[id number]]     Order Status:  No result Specimen:  Body Fluid from Lung, JAIMIE     AFB Culture & Smear [[id number]]     Order Status:  No result Specimen:  Respiratory from BAL, JAIMIE     Blood culture [[id number]] Collected:  07/19/19 1349    Order Status:  Completed Specimen:  Blood Updated:  07/21/19 1822     Blood Culture, Routine No Growth to date      No Growth to date      No Growth to date    Narrative:       Collection has been rescheduled by ESS at 07/19/2019 12:08 Reason:   Unable to collect  07/19/2019 13:49 left hand  Collection has been rescheduled by ESS at 07/19/2019 12:08 Reason:   Unable to collect  07/19/2019 13:49 left hand    Blood culture [[id number]] Collected:  07/19/19 1403    Order Status:  Completed Specimen:  Blood Updated:  07/21/19 1822     Blood Culture, Routine No Growth to date      No Growth to date      No Growth to date    Narrative:       Collection has been rescheduled by ESS at 07/19/2019 12:08 Reason:   Unable to collect  Collection has been rescheduled by ESS at 07/19/2019 12:08 Reason:   Unable to collect    Culture, Respiratory with Gram Stain [[id number]]     Order Status:  No result Specimen:  Respiratory from Sputum, Expectorated     Gram stain [[id number]]     Order Status:  No result Specimen:  Sputum         Significant Imaging:  Reviewed CXR and CT - Opacity in lower JAIMIE likely from aspiration without  obstruction of airway. Tiny L pleural effusion. CXR without evidence of mucous plug.

## 2019-07-22 NOTE — PROGRESS NOTES
[facility]  PROGRESS NOTE    HPI: Jose Martin Hutchins is a 78 y.o. male 78M transferred here overnight from [facility] for epigastric pain for about 24 hours. CT there indicated fluid collection consistent with blood and contrast blush in the lesser sac. Never had this pain before. He was in the hospital getting plasmaphoresis for his neuropathy. Hx of appendectomy and RIHR years ago. Also CABG years ago. No NV. Got several units of pRBCs. Now on levo. Minimal urine output. Has HD access via left subclavian plasmaphoresis catheter. Transferred here for IR evaluation. No fevers. Never had EGD.    IR consulted and repeat CT scan was obtained to evaluate for progression of bleed.  IR reviewed imaging and found that blush was seen on venous phase and not on arterial phase.  Given likely venous nature of this and no significant increase in size the deferred any intervention at this time.    INTERVAL HISTORY:  Off pressors.  Overall improved.  Hemodynamics stable.  H&H stable.  No signs of active bleeding.      VITALS:  Temp:  [97.4 °F (36.3 °C)-97.9 °F (36.6 °C)] 97.4 °F (36.3 °C)  Pulse:  [73-94] 73  Resp:  [14-37] 22  SpO2:  [90 %-99 %] 95 %  BP: (103-162)/(61-80) 126/66    I&Os:  I/O last 3 completed shifts:  In: 1816.7 [I.V.:170; Blood:296.7; IV Piggyback:1350]  Out: 5710 [Urine:5710]    PHYSICAL EXAM:  GEN:  No acute distress, alert orient x3  HEENT:  Anicteric sclera  CV:  Mild tachycardia  RESP:  Nonlabored breathing  ABD:  Soft and compressible, TTP but improved, no rebound, no guarding, + distension - stable  EXT:  No significant edema    LABS:  CBC:   Recent Labs   Lab 07/22/19  0430   WBC 14.87*   RBC 3.05*   HGB 9.0*   HCT 26.6*   *   MCV 87   MCH 29.5   MCHC 33.8     CMP:   Recent Labs   Lab 07/22/19  0430   *   CALCIUM 8.1*   ALBUMIN 2.9*   PROT 5.1*   *   K 3.0*   CO2 28   CL 91*   BUN 53*   CREATININE 2.4*   ALKPHOS 66   ALT 39   AST 56*   BILITOT 0.7     Labs  within the past 24 hours have been reviewed.      ASSESSMENT & PLAN:  78 y.o. male   Intraperitoneal hemorrhage  - repeat CT shows relatively stable collection of blood  - IR recommend no acute interventions at this time  - continue to trend H/H  - will defer surgical intervention unless patient becomes unstable and unresponsive to fluid resuscitation/transfusion  - may need calcium and FFP if total transfusion requirements or greater than 4 PRBC units/24 hrs  - etiology bleeds uncertain at this time  - will continue to follow

## 2019-07-22 NOTE — ASSESSMENT & PLAN NOTE
Hold home BP medications  Levophed discontinued 7/20  BP goal < 140/80  Will continue to monitor blood cultures

## 2019-07-22 NOTE — PROGRESS NOTES
[facility]ious Disease Progress Note     Primary Team: Family Medicine  Consultant Attending: Alessia  Date of Admit: 7/19/2019    Summary of history     Jose Martin Hutchins is a 78 y.o. male with a relevant history of dyslipidemia, CAD, HTN, mitral regurgitation, tricuspid regurgitation, chronic renal disease, GERD, rheumatoid arthritis, and chronic inflammatory demyelinating polyneuritis.     The patient presented to [facility] on 7/19/2019 as a direct admit to the ICU from [facility] for high level of care and interventional radiology services due to suspected retroperitoneal hematoma.     The patient was initially admitted at [facility] for elective plasmapheresis planned by an outside neurologist for his worsening CIPD. During admission, he had a fever of 102 at which time urine cultures, blood cultures, and urinalysis were done. Cultures remained negative but urinalysis was suspect for UTI which was treated with vancomycin and ceftriaxone for 7 days. Per family, they report he was improving after the two initial plasma exchange treatments but acutely decompensated and became hypotensive yesterday afternoon. At this time, hemoglobin dropped from 11 to 9 and CT chest and abdomen showed upper lobe pneumonia and large hematoma in the lesser sac.     Upon arrival to the [facility] ICU, he became unresponsive while being transferred to the bed with systolic BP in the 70's. He has remained afebrile.    Interval events     Stepped down from ICU to floor 7/22.  Bronchoscopy planned for 7/23.      Subjective     Patient awake and talking this morning. He reports no complaints overnight. States his abdominal pain has decreased significantly and is only mildly tender. Denies any fever, chills, nausea, or vomiting.    Current Medications:     Infusions:   norepinephrine bitartrate-D5W Stopped (07/20/19 0715)        Scheduled:   ceFEPime (MAXIPIME) IVPB  2 g Intravenous Daily    docusate sodium  100 mg  Oral BID    doxycycline (VIBRAMYCIN) IVPB  100 mg Intravenous Q12H    DULoxetine  30 mg Oral Daily    famotidine (PF)  20 mg Intravenous Daily    folic acid  1 mg Oral Daily    furosemide  20 mg Intravenous BID    insulin detemir U-100  12 Units Subcutaneous QHS    Lactobacillus acidoph-L.bulgar  1 tablet Oral TID WM    metronidazole  500 mg Intravenous Q8H    nystatin  500,000 Units Oral TID WM    polyethylene glycol  17 g Oral BID    potassium chloride 10%  40 mEq Oral Once    predniSONE  15 mg Oral Daily    tamsulosin  0.4 mg Oral Daily    vancomycin (VANCOCIN) IVPB  1,000 mg Intravenous Q24H        PRN:  sodium chloride, sodium chloride, acetaminophen, dextrose 50 % in water (D50W), dextrose 50 % in water (D50W), glucagon (human recombinant), glucose, glucose, glucose, glucose, HYDROcodone-acetaminophen, insulin aspart U-100, magic mouthwash (diphenhydrAMINE 12.5 mg/5 mL 20 mL, aluminum & magnesium hydroxide-simethicone (MYLANTA) 20 mL, lidocaine HCl 2% (XYLOCAINE) 20 mL) solution, morphine, nitroGLYCERIN, ramelteon, sodium chloride 0.9%, sodium chloride 0.9%, sodium chloride 0.9%    Antibiotics and Day Number of Therapy:  Vancomycin:  - current  Cefepime:  - current  Metronidazole:  - current  Doxycycline:  - current    Objective   Last 24 Hour Vital Signs:  BP  Min: 85/56  Max: 135/76  Temp  Av.7 °F (36.5 °C)  Min: 97.5 °F (36.4 °C)  Max: 98 °F (36.7 °C)  Pulse  Av.9  Min: 81  Max: 94  Resp  Av.4  Min: 12  Max: 46  SpO2  Av.1 %  Min: 90 %  Max: 99 %    Lines, drains, airway:  Trialysis catheter - 7/10  Esparza Catheter -     Physical Examination:  Examination  General: Patient awake and talking upon entering room; lying comfortably in NAD. Obese body habitus.  HEENT: normocephalic, atraumatic, EOMI; shallow ulcer on the tip of tongue with grey/white base  Neck: No thyromegaly or masses   Cardiac: RRR, no murmurs appreciated, no extra heart  sounds  Pulmonary/Chest: CTAB, symmetric chest rise, no wheezing or crackles  GI: Soft, no tenderness to palpation, distended, normoactive bowel sounds  Extremities: no edema, clubbing, or cyanosis  Skin: dry, warm, intact. No bruising or rashes.  Neuro: Alert and oriented    Lab data:  CBC:   Lab Results   Component Value Date    WBC 14.87 (H) 07/22/2019    HGB 9.0 (L) 07/22/2019     (L) 07/22/2019    MCV 87 07/22/2019    RDW 17.8 (H) 07/22/2019       Estimated Creatinine Clearance: 25.8 mL/min (A) (based on SCr of 2.4 mg/dL (H)).    Microbiology Data  Blood culture (7/11) - negative  Blood culture (7/19) - negative  Respiratory culture - pending  C Diff - negative    Radiology:  CT Abdomen/Pelvis (7/18)  The liver, spleen, gallbladder appear normal.  There is a small amount of perihepatic fluid.  The pancreas and adrenal glands are unremarkable.  There is no hydronephrosis.  Multiple cysts are present in the left kidney.  There is an approximately 15 x 4 cm hematoma within the lesser sac.  Within this hematoma there are areas of increased attenuation suggesting active extravasation.  There is moderate fat stranding and hemorrhage in the region of the hepatic flexure and thickening of the right lateral conal fascia.  No bowel obstruction.  No free fluid.     CT Chest (7/18)  There is no CT evidence of pulmonary thromboembolism.  No enlarged hilar or mediastinal lymph nodes are seen.  No suspicious pulmonary nodules or masses are noted.  There is an area of pneumonia in the left upper lobe and mild dependent atelectasis in both lower lobes.  There is a trace left pleural fluid collection.    Chest X-Ray (7/22)  Medical support devices project in stable radiographic position.  Cardiomediastinal silhouette is unchanged.  There are low lung volumes bilaterally with elevation of the right hemidiaphragm.  There is continued patchy consolidation projecting over the left upper lung zone.  No definite new confluent  airspace consolidation or pneumothorax appreciated.    Assessment     Jose Martin Hutchins is a 78 y.o. male with multiple comorbidities including chronic inflammatory demyelinating polyneuritis. He was transferred to [facility] for higher level of care and need for interventional radiology for a retroperitoneal hematoma.     Patient acutely ill due to combination of retroperitoneal hematoma as well as upper lobe pneumonia found on CT chest. White count spike to 30 within the past 2 days and 2 episodes of hypotension. Will cover pneumonia since patient has risk factors for nosocomial organisms and aspiration (seizure like episode).    Patient has been on prolonged courses of high dose steroids, therefore can be considered immunosuppressed. For this reason, organisms other than classic bacteria should be considered.     Recommendations     Left Upper Lobe Pneumonia  - Continue Vancomycin with goal trough 15-20 for 7 day duration  - Continue Cefepime 2g IV q24 for 7 day duration; will watch closely for mental status changes  - Continue Metronidazole 500mg IV q8h for 7 day duration  - Continue Doxycycline 100mg IV q12h for atypical coverage for 5 day duration  - Will follow culture results from bronch on 7/23    Thrush  - Recommend changing Nystatin to Clotrimazole 10 mg 5 times daily  - Will need EKG to obtain QTc for possible future use of fluconazole    Thank you for the consult. Please call with any questions.    Emma Jean Baptiste MD  [facility] Internal Medicine Resident, [facility]

## 2019-07-22 NOTE — ASSESSMENT & PLAN NOTE
Patient presented to hospital with Esparza in place for urinary retention  Due to low urinary output, Lasix was started   On Lasix 20mg BID, Will decrease to Lasix daily  Continue to monitor urine output

## 2019-07-22 NOTE — NURSING TRANSFER
Nursing Transfer Note      7/22/2019     Transfer To: [id number]    Transfer via bed    Transfer with cardiac monitoring    Transported by RN and transport    Medicines sent: yes    Chart send with patient: Yes    Notified: spouse, sisters    Patient reassessed at: 07/22/2019 @ 1135    Upon arrival to floor: cardiac monitor applied, patient oriented to room, call bell in reach and bed in lowest position, RN @ bedside

## 2019-07-22 NOTE — PLAN OF CARE
VN rounds:  VN cued into pt's room with pt's permission.  Pt resting in bed in low position with call bell and 3 family members present. Fall risk protocol discussed with pt.  VN instructed to call for assistance.  Pt aware and agreeable.  Pt denies pain, nausea and anxiety.  No acute distress noted.  Allowed time for questions.  Will continue to be available and intervene as needed.

## 2019-07-22 NOTE — PROGRESS NOTES
[facility] Medicine  Progress Note    Patient Name: Jose Martin Hutchins  MRN: [id number]  Patient Class: IP- Inpatient   Admission Date: 7/19/2019  Length of Stay: 3 days  Attending Physician: Al Boone III, MD  Primary Care Provider: Sam Washington MD        Subjective:     Principal Problem:Intraperitoneal hemorrhage      HPI:  79 yo male with pmhx of HTN, CAD, HLD, CKD, GERD, RA, CIDP, Guillain Burre syndrome and A.fib was transferred from [facility] for IR to drain his intraperitoneal hemorrhage. He was admitted in OSH 8 days ago. He was initially admitted for elective plasmapheresis since his CIDP was worsening. During his stay he became septic and was found to have UTI. He was treated for UTI. While he was in the general floor, he developed fever, hypotensive and became hemodynamically unstable with a drop in his h/h. He had a CT abdomen done which was positive for intraperitoneal hemorrhage that needed to be assessed by IR for possible draining and embolizing the source.   During his transfer to [facility], EMS noticed that the pt had few episodes where his eyes rolled back and his body was shaking. He was also noticed to have similar episode when he was first seen in the ICU. During those episodes his BP was noticeably low as well. Pt was awake upon verbal command. He knows his name but is not oriented to time. He denies any chest pain, sob, nausea, vomiting. He does complaint of pain in his right side of the abdomen.  He has elevated WBC, but no growth from cultures in the outside facility.            Interval History: Patient is doing well today. Patient feels better today than yesterday. Patient continues to have dry mouth. Patient was scheduled for bronchoscopy this morning for fungal cultures but was rescheduled until tomorrow. Denies any fever, chills, SOB, chest pain, nausea or vomiting.     Review of Systems   Constitutional: Negative for activity change, appetite change,  chills, fatigue and fever.   Respiratory: Negative for cough, choking, shortness of breath and wheezing.    Cardiovascular: Negative for chest pain and leg swelling.   Gastrointestinal: Positive for constipation. Negative for abdominal pain, blood in stool, diarrhea and nausea.   Skin: Negative for rash and wound.   Neurological: Positive for weakness. Negative for dizziness, light-headedness and headaches.     Objective:     Vital Signs (Most Recent):  Temp: 97.6 °F (36.4 °C) (07/22/19 0703)  Pulse: 82 (07/22/19 0806)  Resp: 18 (07/22/19 0806)  BP: 127/69 (07/22/19 0806)  SpO2: (!) 93 % (07/22/19 0806) Vital Signs (24h Range):  Temp:  [97.5 °F (36.4 °C)-98 °F (36.7 °C)] 97.6 °F (36.4 °C)  Pulse:  [81-94] 82  Resp:  [12-46] 18  SpO2:  [90 %-99 %] 93 %  BP: ()/(56-76) 127/69     Weight: 77.1 kg (170 lb)  Body mass index is 25.1 kg/m².    Intake/Output Summary (Last 24 hours) at 7/22/2019 0914  Last data filed at 7/22/2019 0700  Gross per 24 hour   Intake 1000 ml   Output 2995 ml   Net -1995 ml      Physical Exam   Constitutional: He is oriented to person, place, and time. He appears well-developed.   HENT:   Head: Normocephalic and atraumatic.   Mouth/Throat: No oropharyngeal exudate.   Eyes: EOM are normal. Right eye exhibits no discharge. Left eye exhibits no discharge. No scleral icterus.   Neck: Normal range of motion. No thyromegaly present.   Cardiovascular: Normal rate, regular rhythm, normal heart sounds and intact distal pulses. Exam reveals no gallop and no friction rub.   No murmur heard.  Trialysis Cath in the left subclavian    Pulmonary/Chest: Effort normal and breath sounds normal. No stridor. No respiratory distress. He has no wheezes. He has no rales. He exhibits no tenderness.   Abdominal: Soft. Bowel sounds are normal. He exhibits no distension. There is tenderness. There is no guarding.   Genitourinary:   Genitourinary Comments: Esparza in place   Musculoskeletal: He exhibits no edema or  deformity.   Neurological: He is alert and oriented to person, place, and time.   Alert and oriented to self, place, and time.   Strength: 3/5 in bilateral upper extremities; 2/5 in the bilateral lower extremities   Skin: Skin is warm. No rash noted. No erythema.   Nursing note and vitals reviewed.      Significant Labs:   Blood Culture: No results for input(s): LABBLOO in the last 48 hours.  CBC:   Recent Labs   Lab 07/20/19  1141 07/21/19  0410 07/22/19  0430   WBC  --  21.14* 14.87*   HGB 7.9* 9.7* 9.0*   HCT 24.1* 28.8* 26.6*   PLT  --  156 147*     CMP:   Recent Labs   Lab 07/20/19  1141 07/21/19  0410 07/22/19  0430   * 131* 131*   K 4.9 3.8 3.0*   CL 92* 92* 91*   CO2 24 26 28   * 86 161*   BUN 62* 60* 53*   CREATININE 3.3* 3.0* 2.4*   CALCIUM 8.0* 8.2* 8.1*   PROT  --  5.5* 5.1*   ALBUMIN  --  3.3* 2.9*   BILITOT  --  0.7 0.7   ALKPHOS  --  66 66   AST  --  57* 56*   ALT  --  41 39   ANIONGAP 12 13 12   EGFRNONAA 17* 19* 25*     Magnesium:   Recent Labs   Lab 07/21/19  0410 07/22/19  0430   MG 1.9 1.7       Significant Imaging:     Chest X-ray: Medical support devices project in stable radiographic position.  Cardiomediastinal silhouette is unchanged.  There are low lung volumes bilaterally with elevation of the right hemidiaphragm.  There is continued patchy consolidation projecting over the left upper lung zone.  No definite new confluent airspace consolidation or pneumothorax appreciated.        Assessment/Plan:      * Intraperitoneal hemorrhage  Pt transferred from [facility] with intraperitoneal hemorrhage requesting IR consult for embolization  CT abdomen: Acute approximately 15 x 4 cm hematoma within the region of the lesser sac with signs of active contrast extravasation.  IR consulted, currently no need for embolization at this time  Surgery consulted, potential for OR if vitals remain unstable  Will continue to monitor H/H      Anemia of chronic disease  Patient required 2u  RBC's  H/H this AM: 9.0/26.6  Will continue to follow H/H    ANDIE (acute kidney injury)  BUN/Cr upon admission: 54/2.8. Baseline 35/1.2.   Likely 2/2 medications vs intravascular volume depletion.   Will start IVF and continue to monitor.   Current BUN/Cr: 53/2.4  Avoid nephrotoxic agents.       Type 2 diabetes mellitus, without long-term current use of insulin  Continue Insulin determir 12 units QHS along with moderate dose correcting scale.   POCT glucose QID  AM glucose goal < 180  Will continue to monitor.       Sepsis  Unclear etiology, concern for Pneumonia  WBC on admission 28 and patient was on pressors for hypotension  Blood cultures from outside hospital: NGTD  ID consulted: Continue Cefepime, Flagyl, Vancomycin, Doxycycline with recommendation for Pulm Consult for Bronchoscopy  Consulted Pulm  7/19 Blood cultures: NGTD  WBC downtrending to 14      Seizure-like activity  Seizure like activity noticed, likely 2/2 hypotensive episodes.  EEG ordered.   Neuro consulted  Neuro checks q4h, will do stat CT with worsening mental status or decreased LOC.   Speech eval for swallowing.   2 mg Ativan IV for generalized seizures lasting > 5 min  Delirium and seizure precautions.       Urinary retention  Patient presented to hospital with Esparza in place for urinary retention  Due to low urinary output, Lasix was started   On Lasix 20mg BID, Will decrease to Lasix daily  Continue to monitor urine output        Pressure injury of right buttock, stage 3  Wound care consulted.       Paroxysmal atrial fibrillation  Patient with history of A.fib in the outside facility  Currently NSR  Will continue to monitor.     Essential hypertension  Hold home BP medications  Levophed discontinued 7/20  BP goal < 140/80  Will continue to monitor blood cultures      CIDP (chronic inflammatory demyelinating polyneuropathy)  Consult Neuro and appreciate rec  Currently on Prednisone 15 mg  Consider IVIG once more hemodynamically stable.            VTE Risk Mitigation (From admission, onward)        Ordered     IP VTE HIGH RISK PATIENT  Once      07/19/19 1452     Place JANE hose  Until discontinued      07/19/19 0910     Place sequential compression device  Until discontinued      07/19/19 0910          Critical care time spent on the evaluation and treatment of severe organ dysfunction, review of pertinent labs and imaging studies, discussions with consulting providers and discussions with patient/family: >30 minutes.      Mary Lou Gee, PGY-2  [facility]  07/22/2019 9:26 AM

## 2019-07-22 NOTE — PLAN OF CARE
Problem: Physical Therapy Goal  Goal: Physical Therapy Goal  Goals to be met by: 2019     Patient will increase functional independence with mobility by performin. Supine to sit with MInimal Assistance  2. Rolling to Left and Right with Minimal Assistance.  3. Sit to stand transfer with Moderate Assistance  3. Bed to chair transfer with Moderate Assistance using Rolling Walker  4. Sitting at edge of bed x10 minutes with Minimal Assistance  3. Lower extremity exercise program x12 reps per handout, with assistance as needed     Outcome: Ongoing (interventions implemented as appropriate)  Patient with improved UE movement and bed mobility; experienced minimal dizziness, moreso weakness while sitting EOB x 3 minutes prior to lying down; initial BP supine~113/70 HR 77; sitting BP unable to obtain; returned to supine with /79 HR 78; sitting up with HOB max elevated in chair position /84 HR 78; later taken after~10 min and /79 HR 77; will cont with POC.

## 2019-07-22 NOTE — ASSESSMENT & PLAN NOTE
BUN/Cr upon admission: 54/2.8. Baseline 35/1.2.   Likely 2/2 medications vs intravascular volume depletion.   Will start IVF and continue to monitor.   Current BUN/Cr: 53/2.4  Avoid nephrotoxic agents.

## 2019-07-22 NOTE — PROGRESS NOTES
[facility]  Pulmonology  Progress Note    Patient Name: Jose Martin Hutchins  MRN: [id number]  Admission Date: 7/19/2019  Hospital Length of Stay: 3 days  Code Status: Full Code  Attending Provider: Al Boone III, MD  Primary Care Provider: Sam Washington MD   Principal Problem: Intraperitoneal hemorrhage    Subjective:     Interval History:   Mr. Hutchins is alert and awake today. He is breathing comfortably on room air, and denies any shortness of breath. He denies any change in muscle strength.     Objective:     Vital Signs (Most Recent):  Temp: 97.6 °F (36.4 °C) (07/22/19 0703)  Pulse: 78 (07/22/19 1100)  Resp: 19 (07/22/19 1100)  BP: 118/75 (07/22/19 1100)  SpO2: 97 % (07/22/19 1100) Vital Signs (24h Range):  Temp:  [97.5 °F (36.4 °C)-98 °F (36.7 °C)] 97.6 °F (36.4 °C)  Pulse:  [78-94] 78  Resp:  [12-46] 19  SpO2:  [90 %-99 %] 97 %  BP: ()/(56-80) 118/75     Weight: 77.1 kg (170 lb)  Body mass index is 25.1 kg/m².      Intake/Output Summary (Last 24 hours) at 7/22/2019 1148  Last data filed at 7/22/2019 1104  Gross per 24 hour   Intake 950 ml   Output 3427 ml   Net -2477 ml       Physical Exam   Constitutional: He appears well-developed and well-nourished. No distress.   HENT:   Head: Normocephalic and atraumatic.   Eyes: Conjunctivae and EOM are normal. No scleral icterus.   Cardiovascular: Normal rate, regular rhythm and normal heart sounds.   No murmur heard.  Pulmonary/Chest:   Comfortable on room air. Clear to ascultation to anterior and lateral fields.   Abdominal: Soft. Bowel sounds are normal. There is no tenderness.   Musculoskeletal: He exhibits no edema.   Neurological: He is alert.   3+/5 strength UEs. 2/5 LE.   Skin: Skin is warm. Capillary refill takes less than 2 seconds.            Lines/Drains/Airways     Central Venous Catheter Line                 Trialysis (Dialysis) Catheter 07/10/19 left subclavian 12 days          Drain                 Urethral Catheter  07/11/19 1810 Latex 16 Fr. 10 days                Significant Labs:    CBC/Anemia Profile:  Recent Labs   Lab 07/21/19  0410 07/22/19  0430   WBC 21.14* 14.87*   HGB 9.7* 9.0*   HCT 28.8* 26.6*    147*   MCV 87 87   RDW 17.8* 17.8*        Chemistries:  Recent Labs   Lab 07/21/19  0410 07/22/19  0430   * 131*   K 3.8 3.0*   CL 92* 91*   CO2 26 28   BUN 60* 53*   CREATININE 3.0* 2.4*   CALCIUM 8.2* 8.1*   ALBUMIN 3.3* 2.9*   PROT 5.5* 5.1*   BILITOT 0.7 0.7   ALKPHOS 66 66   ALT 41 39   AST 57* 56*   MG 1.9 1.7   PHOS 5.1* 4.5     No results for input(s): PH, PCO2, PO2, HCO3, POCSATURATED, BE in the last 72 hours.    Microbiology Results (last 7 days)     Procedure Component Value Units Date/Time    Fungus culture [[id number]]     Order Status:  No result Specimen:  Respiratory from BAL, JAIMIE     Culture, Anaerobe [[id number]]     Order Status:  No result Specimen:  Body Fluid from Lung, JAIMIE     Aerobic culture [[id number]]     Order Status:  No result Specimen:  Body Fluid from Lung, JAIMIE     AFB Culture & Smear [[id number]]     Order Status:  No result Specimen:  Respiratory from BAL, JAIMIE     Blood culture [[id number]] Collected:  07/19/19 1349    Order Status:  Completed Specimen:  Blood Updated:  07/21/19 1822     Blood Culture, Routine No Growth to date      No Growth to date      No Growth to date    Narrative:       Collection has been rescheduled by ESS at 07/19/2019 12:08 Reason:   Unable to collect  07/19/2019 13:49 left hand  Collection has been rescheduled by ESS at 07/19/2019 12:08 Reason:   Unable to collect  07/19/2019 13:49 left hand    Blood culture [[id number]] Collected:  07/19/19 1403    Order Status:  Completed Specimen:  Blood Updated:  07/21/19 1822     Blood Culture, Routine No Growth to date      No Growth to date      No Growth to date    Narrative:       Collection has been rescheduled by ESS at 07/19/2019 12:08 Reason:   Unable to collect  Collection has been rescheduled by ESS at  07/19/2019 12:08 Reason:   Unable to collect    Culture, Respiratory with Gram Stain [[id number]]     Order Status:  No result Specimen:  Respiratory from Sputum, Expectorated     Gram stain [[id number]]     Order Status:  No result Specimen:  Sputum         Significant Imaging:  Reviewed CXR and CT - Opacity in lower JAIMIE likely from aspiration without obstruction of airway. Tiny L pleural effusion. CXR without evidence of mucous plug.    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 Our service is consulted for bronchoscopy with JAIMIE findings on CXR/CT in the setting of chronic immunosuppression.     - Reviewed imaging studies. DDx likely from aspiration pneumonia (although uncommon location). Fungal (e.g PJP) in the setting of immunosuppression could be on differential.  - Plan to proceed with bronchoscopy tomorrow on 7/23/19. Will need NPO at midnight. Will discuss with anesthesia re: bronch with mouth piece.   - On board abx - vanc, cefepime, Flaygl, doxycycline. ID following. Consider de-escalate when appropriate (first vancomycin).         Will follow patient.     Primitivo Marsh MD, -III  Pulmonology  [facility]

## 2019-07-22 NOTE — PROGRESS NOTES
Pharmacokinetic Assessment Follow Up: IV Vancomycin    Vancomycin serum concentration assessment(s):    The trough level was drawned correctly and can be used to guide therapy at this time.    Vancomycin Regimen Plan:    Continue regimen to Vancomycin 1000 mg IV every 24hour with next serum trough concentration measured at 1630 prior to next dose on 7/23     Pharmacy will continue to follow and monitor vancomycin.    Please contact pharmacy at extension [phone] for questions regarding this assessment.    Thank you for the consult,   Carolina Piper     Patient brief summary:  Jose Martin Hutchins is a 78 y.o. male initiated on antimicrobial therapy with IV Vancomycin for treatment of suspected bacteremia    The patient did not receive a loading dose, followed by the current treatment regimen: vancomycin 1000 mg IV every 24 hours    Drug Allergies:   Review of patient's allergies indicates:   Allergen Reactions    Sulfa (sulfonamide antibiotics) Hives    Ramucirumab Palpitations       Actual Body Weight:   77kg    Renal Function:   Estimated Creatinine Clearance: 25.8 mL/min (A) (based on SCr of 2.4 mg/dL (H)).,     Dialysis Method (if applicable):  none     CBC (last 72 hours):  Recent Labs   Lab Result Units 07/19/19  1349 07/19/19  1612 07/19/19  1945 07/19/19  2359 07/20/19  0416 07/20/19  1141 07/21/19  0410 07/22/19  0430   WBC K/uL  --   --   --   --  25.27*  --  21.14* 14.87*   Hemoglobin g/dL 8.4* 7.8* 7.6* 7.2* 6.8*  6.8* 7.9* 9.7* 9.0*   Hemoglobin A1C %  --  7.4*  --   --   --   --   --   --    Hematocrit % 25.3* 23.8* 22.8* 21.9* 20.7*  20.7* 24.1* 28.8* 26.6*   Platelets K/uL  --   --   --   --  218  --  156 147*   Gran% %  --   --   --   --  91.0*  --  94.0* 88.0*   Lymph% %  --   --   --   --  1.0*  --  2.0* 10.0*   Mono% %  --   --   --   --  1.0*  --  0.0* 1.0*   Eosinophil% %  --   --   --   --  0.0  --  0.0 0.0   Basophil% %  --   --   --   --  0.0  --  0.0 0.0   Differential Method   --   --   --    --  Manual  --  Manual Manual       Metabolic Panel (last 72 hours):  Recent Labs   Lab Result Units 07/19/19  1552 07/20/19  0416 07/20/19  0903 07/20/19  1141 07/21/19  0410 07/22/19  0430   Sodium mmol/L  --  130*  --  128* 131* 131*   Sodium Urine Random mmol/L  --   --  23  --   --   --    Potassium mmol/L  --  4.9  --  4.9 3.8 3.0*   Chloride mmol/L  --  92*  --  92* 92* 91*   Chloride, Rand Ur mmol/L  --   --  <20*  --   --   --    CO2 mmol/L  --  26  --  24 26 28   Glucose mg/dL  --  171*  --  202* 86 161*   Glucose, UA  Negative  --   --   --   --   --    BUN, Bld mg/dL  --  61*  --  62* 60* 53*   Creatinine mg/dL  --  3.4*  --  3.3* 3.0* 2.4*   Creatinine, Random Ur mg/dL  --   --  86.6  --   --   --    Albumin g/dL  --  3.1*  --   --  3.3* 2.9*   Total Bilirubin mg/dL  --  0.6  --   --  0.7 0.7   Alkaline Phosphatase U/L  --  59  --   --  66 66   AST U/L  --  46*  --   --  57* 56*   ALT U/L  --  37  --   --  41 39   Magnesium mg/dL  --  2.0  --   --  1.9 1.7   Phosphorus mg/dL  --  5.5*  --   --  5.1* 4.5       Vancomycin Administrations:  vancomycin given in the last 96 hours                     vancomycin in dextrose 5 % 1 gram/250 mL IVPB 1,000 mg (mg) 1,000 mg New Bag 07/21/19 1027     1,000 mg New Bag 07/20/19 1000     1,000 mg New Bag 07/19/19 1107                      Drug levels (last 3 results):  Recent Labs   Lab Result Units 07/22/19  1015   Vancomycin-Trough ug/mL 22.6*       Microbiologic Results:  Microbiology Results (last 7 days)       Procedure Component Value Units Date/Time    Fungus culture [[id number]]     Order Status:  No result Specimen:  Respiratory from BAL, JAIMIE     Culture, Anaerobe [[id number]]     Order Status:  No result Specimen:  Body Fluid from Lung, JAIMIE     Aerobic culture [[id number]]     Order Status:  No result Specimen:  Body Fluid from Lung, JAIMIE     AFB Culture & Smear [[id number]]     Order Status:  No result Specimen:  Respiratory from BAL, JAIMIE     Blood culture  [[id number]] Collected:  07/19/19 1349    Order Status:  Completed Specimen:  Blood Updated:  07/21/19 1822     Blood Culture, Routine No Growth to date      No Growth to date      No Growth to date    Narrative:       Collection has been rescheduled by ESS at 07/19/2019 12:08 Reason:   Unable to collect  07/19/2019 13:49 left hand  Collection has been rescheduled by ESS at 07/19/2019 12:08 Reason:   Unable to collect  07/19/2019 13:49 left hand    Blood culture [[id number]] Collected:  07/19/19 1403    Order Status:  Completed Specimen:  Blood Updated:  07/21/19 1822     Blood Culture, Routine No Growth to date      No Growth to date      No Growth to date    Narrative:       Collection has been rescheduled by ESS at 07/19/2019 12:08 Reason:   Unable to collect  Collection has been rescheduled by ESS at 07/19/2019 12:08 Reason:   Unable to collect    Culture, Respiratory with Gram Stain [[id number]]     Order Status:  No result Specimen:  Respiratory from Sputum, Expectorated     Gram stain [[id number]]     Order Status:  No result Specimen:  Sputum

## 2019-07-22 NOTE — PT/OT/SLP PROGRESS
"Physical Therapy Treatment    Patient Name:  Jose Martin Hutchins   MRN:  [id number]    Recommendations:     Discharge Recommendations:  (TBD)   Discharge Equipment Recommendations: wheelchair, manual, hospital bed, lift device   Barriers to discharge: Decreased caregiver support    Assessment:     Jose Martin Hutchins is a 78 y.o. male admitted with a medical diagnosis of Intraperitoneal hemorrhage.  He presents with the following impairments/functional limitations:  weakness, impaired endurance, gait instability, impaired functional mobilty, impaired self care skills, impaired balance, decreased upper extremity function, decreased lower extremity function, decreased coordination, decreased safety awareness, decreased ROM, impaired joint extensibility, impaired fine motor, abnormal tone, impaired cardiopulmonary response to activity, impaired coordination Patient with improved UE movement and bed mobility; experienced minimal dizziness, moreso weakness while sitting EOB x 3 minutes prior to lying down; initial BP supine~113/70 HR 77; sitting BP unable to obtain; returned to supine with /79 HR 78; sitting up with HOB max elevated in chair position /84 HR 78; later taken after~10 min and /79 HR 77; will cont with POC..    Rehab Prognosis: Good; patient would benefit from acute skilled PT services to address these deficits and reach maximum level of function.    Recent Surgery: Procedure(s) (LRB):  BRONCHOSCOPY, FIBEROPTIC, Bronchoalveolar Lavage +/- biospsy (Bilateral)      Plan:     During this hospitalization, patient to be seen 6 x/week to address the identified rehab impairments via gait training, therapeutic activities, therapeutic exercises, neuromuscular re-education and progress toward the following goals:    · Plan of Care Expires:  07/20/19    Subjective     Chief Complaint: weakness  Patient/Family Comments/goals: "I'm in a daze."  Pain/Comfort:  · Pain Rating 1: (No c/o pain, just stiffness " in R shld with ROM)      Objective:     Communicated with nurse prior to session.  Patient found with SCD, PureWick, blood pressure cuff, peripheral IV, telemetry, bed alarm upon PT entry to room.     General Precautions: Standard, fall   Orthopedic Precautions:N/A   Braces: N/A     Functional Mobility:  · Bed Mobility:   · Rolling L/R: minimal and moderate with use of side rail  · Supine to Sit: moderate assistance and maximal assistance with use of rail  · Sit to Supine: moderate assistance and maximal assistance  · Scooting: maxA x 2 using drawsheet toward HOB; toward HOB while seated EOB with max A x 2 in 3 increments      AM-PAC 6 CLICK MOBILITY  Turning over in bed (including adjusting bedclothes, sheets and blankets)?: 2  Sitting down on and standing up from a chair with arms (e.g., wheelchair, bedside commode, etc.): 2  Moving from lying on back to sitting on the side of the bed?: 2  Moving to and from a bed to a chair (including a wheelchair)?: 2  Need to walk in hospital room?: 1  Climbing 3-5 steps with a railing?: 1  Basic Mobility Total Score: 10       Therapeutic Activities and Exercises:   Patient performed skills as described above with VCs and increased time to complete; multiple rolls for cleansing of BM and application of adult brief; moved to EOB and sat x 3 minutes with c/o some dizziness but moreso weakness; scooted toward HOB in 3 increments then returned to supine; transferred to HOB using drawsheet; placed bed in chair position; pt performed BLE AAROM ex 5 reps each for heel slides, TKEs, APs x 10; cont with POC.    Patient left with bed in chair position with all lines intact, call button in reach, bed alarm on and sisters and nurse present..    GOALS:   Multidisciplinary Problems     Physical Therapy Goals        Problem: Physical Therapy Goal    Goal Priority Disciplines Outcome Goal Variances Interventions   Physical Therapy Goal     PT, PT/OT Ongoing (interventions implemented as  appropriate)     Description:  Goals to be met by: 2019     Patient will increase functional independence with mobility by performin. Supine to sit with MInimal Assistance  2. Rolling to Left and Right with Minimal Assistance.  3. Sit to stand transfer with Moderate Assistance  3. Bed to chair transfer with Moderate Assistance using Rolling Walker  4. Sitting at edge of bed x10 minutes with Minimal Assistance  3. Lower extremity exercise program x12 reps per handout, with assistance as needed                      Time Tracking:     PT Received On: 19  PT Start Time: 1502     PT Stop Time: 1549  PT Total Time (min): 47 min with OT    Billable Minutes: Therapeutic Activity 17 minutes    Treatment Type: Treatment  PT/PTA: PT     PTA Visit Number: 0     Isabela Tinoco, PT  2019

## 2019-07-22 NOTE — PLAN OF CARE
Pt vss for shift without need of levo. H+H stable with no signs of bleeding. Pt slept well for shift with wife at bedside and pt is ready to work with PT/OT. Pt remains NPO for possible bronchoscopy. UO wonderful for shift. Call bell within reach, no signs of distress. Will continue to monitor.

## 2019-07-23 LAB
ACID FAST MOD KINY STN SPEC: NORMAL
ALBUMIN SERPL BCP-MCNC: 3 G/DL (ref 3.5–5.2)
ALP SERPL-CCNC: 70 U/L (ref 55–135)
ALT SERPL W/O P-5'-P-CCNC: 39 U/L (ref 10–44)
ANION GAP SERPL CALC-SCNC: 9 MMOL/L (ref 8–16)
APTT BLDCRRT: 42 SEC (ref 21–32)
AST SERPL-CCNC: 56 U/L (ref 10–40)
BASOPHILS # BLD AUTO: 0.01 K/UL (ref 0–0.2)
BASOPHILS NFR BLD: 0.1 % (ref 0–1.9)
BILIRUB SERPL-MCNC: 1 MG/DL (ref 0.1–1)
BUN SERPL-MCNC: 44 MG/DL (ref 8–23)
CALCIUM SERPL-MCNC: 8.5 MG/DL (ref 8.7–10.5)
CHLORIDE SERPL-SCNC: 92 MMOL/L (ref 95–110)
CO2 SERPL-SCNC: 29 MMOL/L (ref 23–29)
CREAT SERPL-MCNC: 1.9 MG/DL (ref 0.5–1.4)
DIFFERENTIAL METHOD: ABNORMAL
EOSINOPHIL # BLD AUTO: 0 K/UL (ref 0–0.5)
EOSINOPHIL NFR BLD: 0 % (ref 0–8)
ERYTHROCYTE [DISTWIDTH] IN BLOOD BY AUTOMATED COUNT: 17.7 % (ref 11.5–14.5)
EST. GFR  (AFRICAN AMERICAN): 38 ML/MIN/1.73 M^2
EST. GFR  (NON AFRICAN AMERICAN): 33 ML/MIN/1.73 M^2
GLUCOSE SERPL-MCNC: 222 MG/DL (ref 70–110)
HCT VFR BLD AUTO: 27.5 % (ref 40–54)
HGB BLD-MCNC: 9.1 G/DL (ref 14–18)
INR PPP: 1.2 (ref 0.8–1.2)
LYMPHOCYTES # BLD AUTO: 0.2 K/UL (ref 1–4.8)
LYMPHOCYTES NFR BLD: 1.9 % (ref 18–48)
MAGNESIUM SERPL-MCNC: 1.6 MG/DL (ref 1.6–2.6)
MCH RBC QN AUTO: 29.1 PG (ref 27–31)
MCHC RBC AUTO-ENTMCNC: 33.1 G/DL (ref 32–36)
MCV RBC AUTO: 88 FL (ref 82–98)
MONOCYTES # BLD AUTO: 0.6 K/UL (ref 0.3–1)
MONOCYTES NFR BLD: 5.1 % (ref 4–15)
NEUTROPHILS # BLD AUTO: 10.2 K/UL (ref 1.8–7.7)
NEUTROPHILS NFR BLD: 92.9 % (ref 38–73)
PHOSPHATE SERPL-MCNC: 3.5 MG/DL (ref 2.7–4.5)
PLATELET # BLD AUTO: 163 K/UL (ref 150–350)
PMV BLD AUTO: 9 FL (ref 9.2–12.9)
POCT GLUCOSE: 224 MG/DL (ref 70–110)
POCT GLUCOSE: 229 MG/DL (ref 70–110)
POCT GLUCOSE: 248 MG/DL (ref 70–110)
POCT GLUCOSE: 253 MG/DL (ref 70–110)
POTASSIUM SERPL-SCNC: 3.4 MMOL/L (ref 3.5–5.1)
PROT SERPL-MCNC: 5.3 G/DL (ref 6–8.4)
PROTHROMBIN TIME: 12.8 SEC (ref 9–12.5)
RBC # BLD AUTO: 3.13 M/UL (ref 4.6–6.2)
SODIUM SERPL-SCNC: 130 MMOL/L (ref 136–145)
VANCOMYCIN TROUGH SERPL-MCNC: 20.8 UG/ML (ref 10–22)
WBC # BLD AUTO: 11.49 K/UL (ref 3.9–12.7)

## 2019-07-23 PROCEDURE — 87106 FUNGI IDENTIFICATION YEAST: CPT

## 2019-07-23 PROCEDURE — 80053 COMPREHEN METABOLIC PANEL: CPT

## 2019-07-23 PROCEDURE — 97530 THERAPEUTIC ACTIVITIES: CPT

## 2019-07-23 PROCEDURE — 99900025 HC BRONCHOSCOPY-ASST (STAT)

## 2019-07-23 PROCEDURE — 63600175 PHARM REV CODE 636 W HCPCS: Performed by: FAMILY MEDICINE

## 2019-07-23 PROCEDURE — 31622 DX BRONCHOSCOPE/WASH: CPT

## 2019-07-23 PROCEDURE — 25000003 PHARM REV CODE 250: Performed by: STUDENT IN AN ORGANIZED HEALTH CARE EDUCATION/TRAINING PROGRAM

## 2019-07-23 PROCEDURE — 88305 TISSUE EXAM BY PATHOLOGIST: CPT | Mod: 26,,, | Performed by: PATHOLOGY

## 2019-07-23 PROCEDURE — 88112 CYTOPATH CELL ENHANCE TECH: CPT | Mod: 26,,, | Performed by: PATHOLOGY

## 2019-07-23 PROCEDURE — S0030 INJECTION, METRONIDAZOLE: HCPCS | Performed by: STUDENT IN AN ORGANIZED HEALTH CARE EDUCATION/TRAINING PROGRAM

## 2019-07-23 PROCEDURE — 87015 SPECIMEN INFECT AGNT CONCNTJ: CPT

## 2019-07-23 PROCEDURE — 87102 FUNGUS ISOLATION CULTURE: CPT

## 2019-07-23 PROCEDURE — 88305 TISSUE EXAM BY PATHOLOGIST: CPT | Performed by: PATHOLOGY

## 2019-07-23 PROCEDURE — 11000001 HC ACUTE MED/SURG PRIVATE ROOM

## 2019-07-23 PROCEDURE — 63600175 PHARM REV CODE 636 W HCPCS: Performed by: STUDENT IN AN ORGANIZED HEALTH CARE EDUCATION/TRAINING PROGRAM

## 2019-07-23 PROCEDURE — 84100 ASSAY OF PHOSPHORUS: CPT

## 2019-07-23 PROCEDURE — 85025 COMPLETE CBC W/AUTO DIFF WBC: CPT

## 2019-07-23 PROCEDURE — S0028 INJECTION, FAMOTIDINE, 20 MG: HCPCS | Performed by: STUDENT IN AN ORGANIZED HEALTH CARE EDUCATION/TRAINING PROGRAM

## 2019-07-23 PROCEDURE — 87116 MYCOBACTERIA CULTURE: CPT

## 2019-07-23 PROCEDURE — 27000221 HC OXYGEN, UP TO 24 HOURS

## 2019-07-23 PROCEDURE — 87070 CULTURE OTHR SPECIMN AEROBIC: CPT

## 2019-07-23 PROCEDURE — 36415 COLL VENOUS BLD VENIPUNCTURE: CPT

## 2019-07-23 PROCEDURE — 87206 SMEAR FLUORESCENT/ACID STAI: CPT | Mod: 91

## 2019-07-23 PROCEDURE — 63600175 PHARM REV CODE 636 W HCPCS: Performed by: INTERNAL MEDICINE

## 2019-07-23 PROCEDURE — 83735 ASSAY OF MAGNESIUM: CPT

## 2019-07-23 PROCEDURE — 63600175 PHARM REV CODE 636 W HCPCS

## 2019-07-23 PROCEDURE — 80202 ASSAY OF VANCOMYCIN: CPT

## 2019-07-23 PROCEDURE — 88112 CYTOLOGY SPECIMEN- MEDICAL CYTOLOGY (FLUID/WASH/BRUSH): ICD-10-PCS | Mod: 26,,, | Performed by: PATHOLOGY

## 2019-07-23 PROCEDURE — 88305 CYTOLOGY SPECIMEN- MEDICAL CYTOLOGY (FLUID/WASH/BRUSH): ICD-10-PCS | Mod: 26,,, | Performed by: PATHOLOGY

## 2019-07-23 PROCEDURE — 87206 SMEAR FLUORESCENT/ACID STAI: CPT

## 2019-07-23 PROCEDURE — 85730 THROMBOPLASTIN TIME PARTIAL: CPT

## 2019-07-23 PROCEDURE — 25000003 PHARM REV CODE 250: Performed by: FAMILY MEDICINE

## 2019-07-23 PROCEDURE — 94761 N-INVAS EAR/PLS OXIMETRY MLT: CPT

## 2019-07-23 PROCEDURE — 87205 SMEAR GRAM STAIN: CPT

## 2019-07-23 PROCEDURE — 85610 PROTHROMBIN TIME: CPT

## 2019-07-23 RX ORDER — NALOXONE HYDROCHLORIDE 0.4 MG/ML
0.2 INJECTION, SOLUTION INTRAMUSCULAR; INTRAVENOUS; SUBCUTANEOUS
Status: DISCONTINUED | OUTPATIENT
Start: 2019-07-23 | End: 2019-07-29 | Stop reason: HOSPADM

## 2019-07-23 RX ORDER — LIDOCAINE HYDROCHLORIDE 40 MG/ML
10 SOLUTION TOPICAL ONCE
Status: DISCONTINUED | OUTPATIENT
Start: 2019-07-23 | End: 2019-07-23

## 2019-07-23 RX ORDER — LIDOCAINE HYDROCHLORIDE 40 MG/ML
4 SOLUTION TOPICAL ONCE
Status: DISCONTINUED | OUTPATIENT
Start: 2019-07-23 | End: 2019-07-23

## 2019-07-23 RX ORDER — LIDOCAINE HYDROCHLORIDE 40 MG/ML
4 INJECTION, SOLUTION RETROBULBAR ONCE
Status: DISCONTINUED | OUTPATIENT
Start: 2019-07-23 | End: 2019-07-26

## 2019-07-23 RX ORDER — ALBUTEROL SULFATE 2.5 MG/.5ML
SOLUTION RESPIRATORY (INHALATION)
Status: DISPENSED
Start: 2019-07-23 | End: 2019-07-23

## 2019-07-23 RX ORDER — MIDAZOLAM HYDROCHLORIDE 1 MG/ML
1 INJECTION INTRAMUSCULAR; INTRAVENOUS
Status: DISCONTINUED | OUTPATIENT
Start: 2019-07-23 | End: 2019-07-28

## 2019-07-23 RX ORDER — LIDOCAINE HYDROCHLORIDE 40 MG/ML
10 INJECTION, SOLUTION RETROBULBAR ONCE
Status: DISCONTINUED | OUTPATIENT
Start: 2019-07-23 | End: 2019-07-26

## 2019-07-23 RX ORDER — FENTANYL CITRATE 50 UG/ML
25 INJECTION, SOLUTION INTRAMUSCULAR; INTRAVENOUS
Status: DISCONTINUED | OUTPATIENT
Start: 2019-07-23 | End: 2019-07-29 | Stop reason: HOSPADM

## 2019-07-23 RX ORDER — LIDOCAINE HYDROCHLORIDE 20 MG/ML
2 JELLY TOPICAL ONCE
Status: DISCONTINUED | OUTPATIENT
Start: 2019-07-23 | End: 2019-07-26

## 2019-07-23 RX ORDER — NALOXONE HCL 0.4 MG/ML
VIAL (ML) INJECTION
Status: COMPLETED
Start: 2019-07-23 | End: 2019-07-23

## 2019-07-23 RX ORDER — LIDOCAINE HYDROCHLORIDE 20 MG/ML
JELLY TOPICAL
Status: DISCONTINUED | OUTPATIENT
Start: 2019-07-23 | End: 2019-07-29 | Stop reason: HOSPADM

## 2019-07-23 RX ADMIN — METRONIDAZOLE 500 MG: 500 INJECTION, SOLUTION INTRAVENOUS at 12:07

## 2019-07-23 RX ADMIN — CEFEPIME 2 G: 2 INJECTION, POWDER, FOR SOLUTION INTRAVENOUS at 11:07

## 2019-07-23 RX ADMIN — DOCUSATE SODIUM 100 MG: 100 CAPSULE, LIQUID FILLED ORAL at 09:07

## 2019-07-23 RX ADMIN — CLOTRIMAZOLE 10 MG: 10 LOZENGE ORAL; TOPICAL at 05:07

## 2019-07-23 RX ADMIN — CLOTRIMAZOLE 10 MG: 10 LOZENGE ORAL; TOPICAL at 09:07

## 2019-07-23 RX ADMIN — FUROSEMIDE 20 MG: 10 INJECTION, SOLUTION INTRAMUSCULAR; INTRAVENOUS at 12:07

## 2019-07-23 RX ADMIN — DULOXETINE 30 MG: 30 CAPSULE, DELAYED RELEASE ORAL at 12:07

## 2019-07-23 RX ADMIN — LACTOBACILLUS TAB 1 TABLET: TAB at 04:07

## 2019-07-23 RX ADMIN — METRONIDAZOLE 500 MG: 500 INJECTION, SOLUTION INTRAVENOUS at 03:07

## 2019-07-23 RX ADMIN — NALOXONE HYDROCHLORIDE 0.2 MG: 0.4 INJECTION, SOLUTION INTRAMUSCULAR; INTRAVENOUS; SUBCUTANEOUS at 09:07

## 2019-07-23 RX ADMIN — MIDAZOLAM HYDROCHLORIDE 1 MG: 1 INJECTION, SOLUTION INTRAMUSCULAR; INTRAVENOUS at 09:07

## 2019-07-23 RX ADMIN — INSULIN DETEMIR 12 UNITS: 100 INJECTION, SOLUTION SUBCUTANEOUS at 09:07

## 2019-07-23 RX ADMIN — DOXYCYCLINE 100 MG: 100 INJECTION, POWDER, LYOPHILIZED, FOR SOLUTION INTRAVENOUS at 04:07

## 2019-07-23 RX ADMIN — METRONIDAZOLE 500 MG: 500 INJECTION, SOLUTION INTRAVENOUS at 06:07

## 2019-07-23 RX ADMIN — FENTANYL CITRATE 50 MCG: 50 INJECTION, SOLUTION INTRAMUSCULAR; INTRAVENOUS at 09:07

## 2019-07-23 RX ADMIN — FAMOTIDINE 20 MG: 10 INJECTION, SOLUTION INTRAVENOUS at 12:07

## 2019-07-23 RX ADMIN — CLOTRIMAZOLE 10 MG: 10 LOZENGE ORAL; TOPICAL at 06:07

## 2019-07-23 RX ADMIN — VANCOMYCIN HYDROCHLORIDE 1000 MG: 1 INJECTION, POWDER, LYOPHILIZED, FOR SOLUTION INTRAVENOUS at 07:07

## 2019-07-23 RX ADMIN — INSULIN ASPART 1 UNITS: 100 INJECTION, SOLUTION INTRAVENOUS; SUBCUTANEOUS at 05:07

## 2019-07-23 RX ADMIN — FENTANYL CITRATE 25 MCG: 50 INJECTION, SOLUTION INTRAMUSCULAR; INTRAVENOUS at 09:07

## 2019-07-23 RX ADMIN — INSULIN ASPART 1 UNITS: 100 INJECTION, SOLUTION INTRAVENOUS; SUBCUTANEOUS at 09:07

## 2019-07-23 RX ADMIN — DOCUSATE SODIUM 100 MG: 100 CAPSULE, LIQUID FILLED ORAL at 12:07

## 2019-07-23 RX ADMIN — FOLIC ACID 1 MG: 1 TABLET ORAL at 12:07

## 2019-07-23 RX ADMIN — CLOTRIMAZOLE 10 MG: 10 LOZENGE ORAL; TOPICAL at 01:07

## 2019-07-23 RX ADMIN — LACTOBACILLUS TAB 1 TABLET: TAB at 12:07

## 2019-07-23 RX ADMIN — TAMSULOSIN HYDROCHLORIDE 0.4 MG: 0.4 CAPSULE ORAL at 12:07

## 2019-07-23 RX ADMIN — PREDNISONE 15 MG: 10 TABLET ORAL at 12:07

## 2019-07-23 RX ADMIN — INSULIN ASPART 2 UNITS: 100 INJECTION, SOLUTION INTRAVENOUS; SUBCUTANEOUS at 04:07

## 2019-07-23 NOTE — ASSESSMENT & PLAN NOTE
Patient presented to hospital with Esparza in place for urinary retention  Due to low urinary output, Lasix was started   Continue Lasix 20mg daily  Continue to monitor urine output

## 2019-07-23 NOTE — SUBJECTIVE & OBJECTIVE
Interval History:  Mr. Hutchins is feeling fine this morning prior to bronchoscopy. No event overnight.     Objective:     Vital Signs (Most Recent):  Temp: 97.4 °F (36.3 °C) (07/23/19 1131)  Pulse: 87 (07/23/19 1131)  Resp: (!) 22 (07/23/19 1131)  BP: 108/70 (07/23/19 1131)  SpO2: 98 % (07/23/19 1110) Vital Signs (24h Range):  Temp:  [96.1 °F (35.6 °C)-98.7 °F (37.1 °C)] 97.4 °F (36.3 °C)  Pulse:  [73-98] 87  Resp:  [14-22] 22  SpO2:  [92 %-100 %] 98 %  BP: ()/(51-89) 108/70     Weight: 76.7 kg (169 lb 1.5 oz)  Body mass index is 24.97 kg/m².      Intake/Output Summary (Last 24 hours) at 7/23/2019 1146  Last data filed at 7/23/2019 0516  Gross per 24 hour   Intake 1100 ml   Output 1300 ml   Net -200 ml       Physical Exam   Constitutional: He is oriented to person, place, and time. He appears well-developed and well-nourished.   Comfortable, awake.   HENT:   Head: Normocephalic and atraumatic.   Noted round facies. Dry mouth with thrush on tongue, no oral thrush.   Eyes: Conjunctivae and EOM are normal. No scleral icterus.   Neck: Neck supple.   Cardiovascular: Normal rate, regular rhythm and normal heart sounds.   No murmur heard.  Pulmonary/Chest:   Anterior and lateral breath sounds clear. Diminished lower breath sounds.   Abdominal: Soft. There is no tenderness.   Musculoskeletal:   3+/5 UE and 2/5 LE strength.   Neurological: He is alert and oriented to person, place, and time.   Skin: Skin is warm. Capillary refill takes less than 2 seconds.       Lines/Drains/Airways     Central Venous Catheter Line                 Trialysis (Dialysis) Catheter 07/10/19 left subclavian 13 days          Drain                 Urethral Catheter 07/11/19 1810 Latex 16 Fr. 11 days                Significant Labs:    CBC/Anemia Profile:  Recent Labs   Lab 07/22/19  0430 07/23/19  0530   WBC 14.87* 11.49   HGB 9.0* 9.1*   HCT 26.6* 27.5*   * 163   MCV 87 88   RDW 17.8* 17.7*        Chemistries:  Recent Labs   Lab  07/22/19  0430 07/23/19  0530   * 130*   K 3.0* 3.4*   CL 91* 92*   CO2 28 29   BUN 53* 44*   CREATININE 2.4* 1.9*   CALCIUM 8.1* 8.5*   ALBUMIN 2.9* 3.0*   PROT 5.1* 5.3*   BILITOT 0.7 1.0   ALKPHOS 66 70   ALT 39 39   AST 56* 56*   MG 1.7 1.6   PHOS 4.5 3.5     Significant Imaging:  No new imaging study

## 2019-07-23 NOTE — PLAN OF CARE
Problem: Adult Inpatient Plan of Care  Goal: Plan of Care Review  Outcome: Ongoing (interventions implemented as appropriate)  O2 saturation 99% on nasal cannula 2 lpm .Will continue to monitor.

## 2019-07-23 NOTE — PROGRESS NOTES
[facility] Medicine  Progress Note    Patient Name: Jose Martin Hutchins  MRN: [id number]  Patient Class: IP- Inpatient   Admission Date: 7/19/2019  Length of Stay: 4 days  Attending Physician: Al Boone III, MD  Primary Care Provider: Sam Washington MD        Subjective:     Principal Problem:Intraperitoneal hemorrhage      HPI:  77 yo male with pmhx of HTN, CAD, HLD, CKD, GERD, RA, CIDP, Guillain Burre syndrome and A.fib was transferred from [facility] for IR to drain his intraperitoneal hemorrhage. He was admitted in [facility] 8 days ago. He was initially admitted for elective plasmapheresis since his CIDP was worsening. During his stay he became septic and was found to have UTI. He was treated for UTI. While he was in the general floor, he developed fever, hypotensive and became hemodynamically unstable with a drop in his h/h. He had a CT abdomen done which was positive for intraperitoneal hemorrhage that needed to be assessed by IR for possible draining and embolizing the source.   During his transfer to [facility], EMS noticed that the pt had few episodes where his eyes rolled back and his body was shaking. He was also noticed to have similar episode when he was first seen in the ICU. During those episodes his BP was noticeably low as well. Pt was awake upon verbal command. He knows his name but is not oriented to time. He denies any chest pain, sob, nausea, vomiting. He does complaint of pain in his right side of the abdomen.  He has elevated WBC, but no growth from cultures in the outside facility.            Overview/Hospital Course:  No notes on file    Interval History: Patient continues to feel improvement. Patient is tolerating diet with no nausea or vomiting. Patient had bowel movement yesterday. Patient is continuing to work with PT/OT. Continues to have some throat pain with swallowing. Pulm will take patient for bronchoscopy today.     Review of Systems   Constitutional:  Negative for activity change, appetite change, chills, fatigue and fever.   Respiratory: Negative for cough, choking, shortness of breath and wheezing.    Cardiovascular: Negative for chest pain and leg swelling.   Gastrointestinal: Negative for abdominal pain, blood in stool, constipation, diarrhea and nausea.   Skin: Negative for rash and wound.   Neurological: Positive for weakness. Negative for dizziness, light-headedness and headaches.     Objective:     Vital Signs (Most Recent):  Temp: 96.1 °F (35.6 °C) (07/23/19 0707)  Pulse: 75 (07/23/19 0753)  Resp: 20 (07/23/19 0707)  BP: 115/74 (07/23/19 0707)  SpO2: (!) 93 % (07/23/19 0325) Vital Signs (24h Range):  Temp:  [96.1 °F (35.6 °C)-98.7 °F (37.1 °C)] 96.1 °F (35.6 °C)  Pulse:  [73-87] 75  Resp:  [16-27] 20  SpO2:  [92 %-97 %] 93 %  BP: ()/(56-80) 115/74     Weight: 76.7 kg (169 lb 1.5 oz)  Body mass index is 24.97 kg/m².    Intake/Output Summary (Last 24 hours) at 7/23/2019 0846  Last data filed at 7/23/2019 0516  Gross per 24 hour   Intake 1450 ml   Output 2052 ml   Net -602 ml      Physical Exam   Constitutional: He is oriented to person, place, and time. He appears well-developed.   HENT:   Head: Normocephalic and atraumatic.   Mouth/Throat: No oropharyngeal exudate.   Eyes: EOM are normal. Right eye exhibits no discharge. Left eye exhibits no discharge. No scleral icterus.   Neck: Normal range of motion. No thyromegaly present.   Cardiovascular: Normal rate, regular rhythm, normal heart sounds and intact distal pulses. Exam reveals no gallop and no friction rub.   No murmur heard.  Trialysis Cath in the left subclavian    Pulmonary/Chest: Effort normal and breath sounds normal. No stridor. No respiratory distress. He has no wheezes. He has no rales. He exhibits no tenderness.   Abdominal: Soft. Bowel sounds are normal. He exhibits no distension. There is tenderness. There is no guarding.   Genitourinary:   Genitourinary Comments: Esparza in place    Musculoskeletal: He exhibits no edema or deformity.   Neurological: He is alert and oriented to person, place, and time.   Alert and oriented to self, place, and time.   Strength: 3/5 in bilateral upper extremities; 2/5 in the bilateral lower extremities   Skin: Skin is warm. No rash noted. No erythema.   Nursing note and vitals reviewed.      Significant Labs:   Blood Culture: No results for input(s): LABBLOO in the last 48 hours.  CBC:   Recent Labs   Lab 07/22/19 0430 07/23/19  0530   WBC 14.87* 11.49   HGB 9.0* 9.1*   HCT 26.6* 27.5*   * 163     CMP:   Recent Labs   Lab 07/22/19 0430 07/23/19  0530   * 130*   K 3.0* 3.4*   CL 91* 92*   CO2 28 29   * 222*   BUN 53* 44*   CREATININE 2.4* 1.9*   CALCIUM 8.1* 8.5*   PROT 5.1* 5.3*   ALBUMIN 2.9* 3.0*   BILITOT 0.7 1.0   ALKPHOS 66 70   AST 56* 56*   ALT 39 39   ANIONGAP 12 9   EGFRNONAA 25* 33*     Magnesium:   Recent Labs   Lab 07/22/19 0430 07/23/19  0530   MG 1.7 1.6       Significant Imaging: All imaging reviewed        Assessment/Plan:      * Intraperitoneal hemorrhage  Pt transferred from [facility] with intraperitoneal hemorrhage requesting IR consult for embolization  CT abdomen: Acute approximately 15 x 4 cm hematoma within the region of the lesser sac with signs of active contrast extravasation.  IR consulted, currently no need for embolization at this time  Surgery consulted, potential for OR if vitals remain unstable  Will continue to monitor H/H      Anemia of chronic disease  Patient required 2u RBC's  H/H this AM: 9.0/26.6  Will continue to follow H/H    ANDIE (acute kidney injury)  BUN/Cr upon admission: 54/2.8. Baseline 35/1.2.   Likely 2/2 medications vs intravascular volume depletion.   Will start IVF and continue to monitor.   Cr continuing to downtrend  Avoid nephrotoxic agents.       Type 2 diabetes mellitus, without long-term current use of insulin  Continue Insulin determir 12 units QHS along with moderate dose  correcting scale.   POCT glucose QID  AM glucose goal < 180  Will continue to monitor.       Sepsis  Unclear etiology, concern for Pneumonia  WBC on admission 28 and patient was on pressors for hypotension  Blood cultures from outside hospital: NGTD  ID consulted: Continue Cefepime, Flagyl, Vancomycin, Doxycycline with recommendation for Pulm Consult for Bronchoscopy  Consulted Pulm for bronchoscopy for fungal cultures  7/19 Blood cultures: NGTD  WBC downtrending to 11      Seizure-like activity  Seizure like activity noticed, likely 2/2 hypotensive episodes.  EEG: No signs of seizure activity  2 mg Ativan IV for generalized seizures lasting > 5 min  Delirium and seizure precautions.       Urinary retention  Patient presented to hospital with Esparza in place for urinary retention  Due to low urinary output, Lasix was started   Continue Lasix 20mg daily  Continue to monitor urine output        Pressure injury of right buttock, stage 3  Wound care consulted.       Paroxysmal atrial fibrillation  Patient with history of A.fib in the outside facility  Currently NSR  HR goal < 120  Will continue to monitor.     Essential hypertension  Hold home BP medications  Levophed discontinued 7/20  BP goal < 140/80        CIDP (chronic inflammatory demyelinating polyneuropathy)  Consult Neuro and appreciate rec; Neurology signed off  Continue Prednisone 15mg daily  Patient to follow up with Neuro outpatient after discharge        VTE Risk Mitigation (From admission, onward)        Ordered     IP VTE HIGH RISK PATIENT  Once      07/19/19 1452     Place JANE hose  Until discontinued      07/19/19 0910     Place sequential compression device  Until discontinued      07/19/19 0910          Dispo: Pending Bronchoscopy, Pending ID final recs      Mary Lou Gee, PGY-2  [facility]  07/23/2019 8:58 AM

## 2019-07-23 NOTE — ASSESSMENT & PLAN NOTE
78 y.o. male   Intraperitoneal hemorrhage  - repeat CT shows relatively stable collection of blood  - IR recommend no acute interventions at this time  - continue to trend H/H  - will defer surgical intervention unless patient becomes unstable and unresponsive to fluid resuscitation/transfusion  - may need calcium and FFP if total transfusion requirements or greater than 4 PRBC units/24 hrs  - etiology bleeds uncertain at this time  - OK for diet as his appetite dictates    General surgery will sign off. Please contact us if additional questions or deterioration in his condition.

## 2019-07-23 NOTE — ASSESSMENT & PLAN NOTE
Noted not on any anticoagulation in the setting of retroperitoneal hematoma. Now s/p drainage and H/H stable. CHADVASC is at least 5. Patient with very limited mobility given CIDP. Need to address anticoagulation. Defer to primary team/PCP.

## 2019-07-23 NOTE — ASSESSMENT & PLAN NOTE
Seizure like activity noticed, likely 2/2 hypotensive episodes.  EEG: No signs of seizure activity  2 mg Ativan IV for generalized seizures lasting > 5 min  Delirium and seizure precautions.

## 2019-07-23 NOTE — PROCEDURES
Bronchoscopy Procedure Note      Date of Operation: 07/23/2019    Pre-op Diagnosis: abnormal ct scan    Post-op Diagnosis: abnormal ct scan    Surgeons: Yuko Velasquez MD (fellow) &  Kunal Franks MD(staff)    Anesthesia:   Versed 3 mg (in divided doses)  Fentanyl 125 mcg (in divided doses)  2% lidocaine jelly  2% lidocaine  (infiltrated onto cords during procedure)  1% lidocaine (infiltrated onto cords and into airways during procedure)    Operation: Flexible fiberoptic bronchoscopy, diagnostic     Specimen: BAL   Estimated Blood Loss: Minimal    Indications:   Abnormal CT scan with concerns for atypical infection    Consent:   The risks, benefits, complications, treatment options and expected outcomes were discussed with the patient. The possibilities of reaction to medication, pulmonary aspiration, pneumothorax, bleeding, failure to diagnose their condition, and creating a complication requiring transfusion or operation were discussed with the patient. All questions were answered, and the consent was signed and placed in the chart.    Description of Procedure:  The patient was seen in the pulmonary lab, identified as  Jose Martin Hutchins with 1940  and the procedure verified as Flexible Fiberoptic Bronchoscopy.  A Time Out was conducted, and the above information confirmed.     After the induction of topical oral anesthesia, the patient was positioned supine, and the bronchoscope was passed through the mouth without difficulty. The vocal cords were visualized and  2% buffered lidocaine 4 ml was topically placed onto the cords. The cords were unremarkable in appearance with normal motion. The scope was then passed into the trachea.  1% buffered lidocaine 2 ml was used topically on the ludivina.  Careful inspection of the tracheal lumen was accomplished. The scope was sequentially passed into the left main and then left upper and lower bronchi and segmental bronchi. The scope was then withdrawn and advanced  into the right main bronchus and then into the RUL, RML, and RLL bronchi and segmental bronchi. There was mild erythema noted on the R UL but no endobronchial abnormalities noted in any area of the tracheobronchial tree.      cc instilled and 40 cc returned. Fluid sent for cultures and cytology.     The scope was then withdrawn, and the patient was taken to the recovery area he did have some difficulty waking up right after the procedure and was given 0.2 of narcan. He then had no further issues with recovery.     Yuko Velasquez M.D.  [facility] Pulmonary & Critical Care Fellow

## 2019-07-23 NOTE — PLAN OF CARE
Problem: Occupational Therapy Goal  Goal: Occupational Therapy Goal  Goals to be met by: 8/20/2019    Patient will increase functional independence with ADLs by performing:    Feeding with Set-up Assistance.  UE Dressing with Minimal Assistance.  LE Dressing with Moderate Assistance.  Grooming while seated with Set-up Assistance.  Toileting from bedside commode with Moderate Assistance for hygiene and clothing management.   Sitting at edge of bed x20 minutes with Supervision.  Rolling to Bilateral with Modified [place].   Supine to sit with Modified [place].  Step transfer with Minimal Assistance  Toilet transfer to bedside commode with Minimal Assistance.  Increased functional strength to WFL for ADLS.  Upper extremity exercise program 10 reps per handout, with supervision.     Outcome: Ongoing (interventions implemented as appropriate)  Patient tolerated EOB sitting /c minimal drop in BP, but asymptomatic this date. Patient with delayed processing of commands. Patient will benefit from continued post-acute OT to address functional deficits.

## 2019-07-23 NOTE — PT/OT/SLP PROGRESS
Occupational Therapy  Visit Attempt    Patient Name:  Jose Martin Hutchins   MRN:  [id number]    Patient not seen today secondary to MARITZA for bronchoscopy. Will follow-up later, as time permits.    GOOD Steve  7/23/2019

## 2019-07-23 NOTE — PROGRESS NOTES
[facility]  Pulmonology  Progress Note    Patient Name: Jose Martin Hutchins  MRN: [id number]  Admission Date: 7/19/2019  Hospital Length of Stay: 4 days  Code Status: Full Code  Attending Provider: Al Boone III, MD  Primary Care Provider: Sam Washington MD   Principal Problem: Intraperitoneal hemorrhage    Subjective:     Interval History:  Mr. Hutchins is feeling fine this morning prior to bronchoscopy. No event overnight.     Objective:     Vital Signs (Most Recent):  Temp: 97.4 °F (36.3 °C) (07/23/19 1131)  Pulse: 87 (07/23/19 1131)  Resp: (!) 22 (07/23/19 1131)  BP: 108/70 (07/23/19 1131)  SpO2: 98 % (07/23/19 1110) Vital Signs (24h Range):  Temp:  [96.1 °F (35.6 °C)-98.7 °F (37.1 °C)] 97.4 °F (36.3 °C)  Pulse:  [73-98] 87  Resp:  [14-22] 22  SpO2:  [92 %-100 %] 98 %  BP: ()/(51-89) 108/70     Weight: 76.7 kg (169 lb 1.5 oz)  Body mass index is 24.97 kg/m².      Intake/Output Summary (Last 24 hours) at 7/23/2019 1146  Last data filed at 7/23/2019 0516  Gross per 24 hour   Intake 1100 ml   Output 1300 ml   Net -200 ml       Physical Exam   Constitutional: He is oriented to person, place, and time. He appears well-developed and well-nourished.   Comfortable, awake.   HENT:   Head: Normocephalic and atraumatic.   Noted round facies. Dry mouth with thrush on tongue, no oral thrush.   Eyes: Conjunctivae and EOM are normal. No scleral icterus.   Neck: Neck supple.   Cardiovascular: Normal rate, regular rhythm and normal heart sounds.   No murmur heard.  Pulmonary/Chest:   Anterior and lateral breath sounds clear. Diminished lower breath sounds.   Abdominal: Soft. There is no tenderness.   Musculoskeletal:   3+/5 UE and 2/5 LE strength.   Neurological: He is alert and oriented to person, place, and time.   Skin: Skin is warm. Capillary refill takes less than 2 seconds.       Lines/Drains/Airways     Central Venous Catheter Line                 Trialysis (Dialysis) Catheter 07/10/19 left  subclavian 13 days          Drain                 Urethral Catheter 07/11/19 1810 Latex 16 Fr. 11 days                Significant Labs:    CBC/Anemia Profile:  Recent Labs   Lab 07/22/19  0430 07/23/19  0530   WBC 14.87* 11.49   HGB 9.0* 9.1*   HCT 26.6* 27.5*   * 163   MCV 87 88   RDW 17.8* 17.7*        Chemistries:  Recent Labs   Lab 07/22/19  0430 07/23/19  0530   * 130*   K 3.0* 3.4*   CL 91* 92*   CO2 28 29   BUN 53* 44*   CREATININE 2.4* 1.9*   CALCIUM 8.1* 8.5*   ALBUMIN 2.9* 3.0*   PROT 5.1* 5.3*   BILITOT 0.7 1.0   ALKPHOS 66 70   ALT 39 39   AST 56* 56*   MG 1.7 1.6   PHOS 4.5 3.5     Significant Imaging:  No new imaging study    Assessment/Plan:     Pneumonia of left upper lobe   Initially presented to OSH with sepsis presentation concerning for JAIMIE PNA. S/p bronchoscopy today with no apparent structural abnormality noted. Lavage and cultures collected.     - Will follow up cultures result.   - Defer to ID regarding abx and duration. Currently on board abx - vanc, cefepime, Flagyl, Doxy. Also on antifungal for oral thrush.        Paroxysmal atrial fibrillation  Noted not on any anticoagulation in the setting of retroperitoneal hematoma. Now s/p drainage and H/H stable. CHADVASC is at least 5. Patient with very limited mobility given CIDP. Need to address anticoagulation. Defer to primary team/PCP.      Will sign off. Please call if there is any question.     Primitivo Marsh MD, -III  Pulmonology  [facility]

## 2019-07-23 NOTE — PT/OT/SLP PROGRESS
Occupational Therapy   Treatment    Name: Jose Martin Hutchins  MRN: [id number]  Admitting Diagnosis:  Intraperitoneal hemorrhage       Recommendations:     Discharge Recommendations: (TBD)  Discharge Equipment Recommendations:  (wc, HB, julio lift on order per wife's report)  Barriers to discharge:  None    Assessment:     Jose Martin Hutchins is a 78 y.o. male with a medical diagnosis of Intraperitoneal hemorrhage.  He presents with increased AROM and decreased edema in arms and legs. Poor sitting EOB tolerance 2/2 likely hypotension. Continue with OT POC.. Performance deficits affecting function are weakness, impaired endurance, impaired self care skills, impaired functional mobilty, gait instability, impaired balance, decreased coordination, decreased upper extremity function, decreased lower extremity function, decreased safety awareness, decreased ROM, impaired coordination.     Rehab Prognosis:  Fair; patient would benefit from acute skilled OT services to address these deficits and reach maximum level of function.       Plan:     Patient to be seen 5 x/week to address the above listed problems via self-care/home management, therapeutic activities, therapeutic exercises  · Plan of Care Expires:    · Plan of Care Reviewed with: patient, family    Subjective     Pain/Comfort:  · Pain Rating 1: (no pain just stiffness R shld with ROM)  · Location - Side 1: Right    Objective:     Communicated with: nurse prior to session.  Patient found HOB elevated with bed alarm, telemetry, peripheral IV upon OT entry to room.    General Precautions: Standard, fall, aspiration   Orthopedic Precautions:N/A   Braces: N/A     Occupational Performance:     Bed Mobility:    · Patient completed Rolling/Turning to Left with  minimum assistance and with side rail  · Patient completed Rolling/Turning to Right with minimum assistance and with side rail  · Patient completed Scooting/Bridging with maximal assistance and drawsheet  "persons  · Patient completed Supine to Sit with moderate assistance, maximal assistance and 1 persons  · Patient completed Sit to Supine with moderate assistance and maximal assistance     Activities of Daily Living:  · Toileting: total assistance pt said he was having BM but did not ask for bed pan; clean up in bed and applied brief with 2 person assist; pt rolled side to side wih min assist  Using rail.      [facility] 6 Click ADL: 12    Treatment & Education:  Role of OT and POC  Pt. more alert and vocal today but sleepy and stated he felt like he was in a" daze like in another world." He kept closing his eyes but  able to arouse with min cueing throughout session.  He sat EOB only ~3 min 2/2 "weakness" in his words but highly suspect hypotension/dizziness. Unable to get BP reading in sitting,machine read /error.  Once back in supine, /79 and Pt. Indicated he "felt better and less weak." , Bed raised to put pt in chair position; /84.  No weakness reported.  He performed AAROM x 3 reps for shld flex with 3 sec hold and slow reversal for strengthening; very fatigued at this point, closing his eyes more but still able to talk to therapists. Intermittent "jittery" /jerkiness in legs and arms noted after sitting EOB.    Patient left HOB elevated with all lines intact, call button in reach, bed alarm on, nurse notified and sisters presentEducation:      GOALS:   Multidisciplinary Problems     Occupational Therapy Goals        Problem: Occupational Therapy Goal    Goal Priority Disciplines Outcome Interventions   Occupational Therapy Goal     OT, PT/OT Ongoing (interventions implemented as appropriate)    Description:  Goals to be met by: 8/20/2019    Patient will increase functional independence with ADLs by performing:    Feeding with Set-up Assistance.  UE Dressing with Minimal Assistance.  LE Dressing with Moderate Assistance.  Grooming while seated with Set-up Assistance.  Toileting from bedside commode with " Moderate Assistance for hygiene and clothing management.   Sitting at edge of bed x20 minutes with Supervision.  Rolling to Bilateral with Modified [place].   Supine to sit with Modified [place].  Step transfer with Minimal Assistance  Toilet transfer to bedside commode with Minimal Assistance.  Increased functional strength to WFL for ADLS.  Upper extremity exercise program 10 reps per handout, with supervision.                      Time Tracking:     OT Date of Treatment: 07/22/19  OT Start Time: 1502  OT Stop Time: 1549  OT Total Time (min): 47 min    Billable Minutes:Therapeutic Activity 15  Therapeutic Exercise 15  Total Time 30    Loyda Barron OT  7/22/2019

## 2019-07-23 NOTE — PLAN OF CARE
Problem: Adult Inpatient Plan of Care  Goal: Plan of Care Review  Outcome: Ongoing (interventions implemented as appropriate)  Pt remains alert, disoriented to place and time but reorients well. Minimal complaints of pain since transfer from ICU. Worked well with PT/OT. Improved tolerance of oral food/liquid, but intake remains poor. Pt to be NPO after midnight for bronchoscopy in AM. Good output in [place], clear yellow urine. VSS. Safety precautions maintained.

## 2019-07-23 NOTE — PLAN OF CARE
TN spoke with Olga from [facility] [phone] regarding pt discharge plan. Per Olga, they are still following patient. Olga concerned that pt has not been working with therapy. TN informed her that OT attempted to see patient today but pt off unit for bronchoscopy. TN questioned if pt could come over for PICC for abx since pt has not finished IV abx. Olga to follow up regarding pt IV abx tomorrow with SW.       07/23/19 6488   Discharge Reassessment   Assessment Type Discharge Planning Reassessment   Provided patient/caregiver education on the expected discharge date and the discharge plan Yes   Do you have any problems affording any of your prescribed medications? No   Discharge Plan A Rehab   Anticipated Discharge Disposition Rehab

## 2019-07-23 NOTE — PT/OT/SLP PROGRESS
Occupational Therapy   Treatment      Name: Jose Martin Hutchins  MRN: [id number]  Admitting Diagnosis:  Intraperitoneal hemorrhage  1 Day Post-Op    Recommendations:     Discharge Recommendations: (TBD)  Discharge Equipment Recommendations:  wheelchair, manual, hospital bed, lift device  Barriers to discharge:  None    Assessment:   Patient tolerated EOB sitting /c minimal drop in BP, but asymptomatic this date. Patient with delayed processing of commands. Patient will benefit from continued post-acute OT to address functional deficits.     Jose Martin Hutchins is a 78 y.o. male with a medical diagnosis of Intraperitoneal hemorrhage. Performance deficits affecting function are weakness, impaired endurance, impaired self care skills, impaired functional mobilty, gait instability, impaired balance, impaired cognition, decreased upper extremity function, decreased lower extremity function, decreased coordination, decreased safety awareness, decreased ROM, impaired skin, impaired coordination.     Rehab Prognosis:  Fair+; patient would benefit from acute skilled OT services to address these deficits and reach maximum level of function.       Plan:     Patient to be seen 5 x/week to address the above listed problems via self-care/home management, therapeutic activities, therapeutic exercises  · Plan of Care Expires:    · Plan of Care Reviewed with: patient, family    Subjective     Pain/Comfort:  · Pain Rating 1: 0/10  · Pain Rating Post-Intervention 1: 0/10    Objective:     Communicated with: nurseYojana prior to session.  Patient found HOB elevated with bed alarm, telemetry, peripheral IV upon OT entry to room.    General Precautions: Standard, fall, aspiration   Orthopedic Precautions:N/A   Braces: N/A     Bed Mobility:    · Patient completed Rolling/Turning to Left with  minimum assistance, moderate assistance and with side rail  · Patient completed Rolling/Turning to Right with minimum assistance, moderate  assistance and with side rail  · Patient completed Scooting/Bridging with ModA to EOB; MaxA of 2 laterally to HOB  · Patient completed Supine to Sit with moderate assistance, with side rail and increased VCs  · Patient completed Sit to Supine with maximal assistance and 2 persons     Functional Mobility/Transfers:  · N/A    Activities of Daily Living:  · N/A    [facility] 6 Click ADL: 12    Treatment & Education:  Patient with bed mob as noted above -- increased time, effort and VCs for all transitions. Patient sat EOB ~25 mins /c ModA-SBA 2* posterior leaning and lifting B feet off ground. Patient given proprioceptive input to BLEs to keep feet on ground. Patient tolerated core/postural work while EOB. Difficulty attempting to scoot laterally to HOB 2* delayed/decreased command following of task. Patient's HOB elevated fully to modified chair position. PCT notified of patient and linens soiled.     Patient left HOB 60* with all lines intact, call button in reach, bed alarm on, nurse notified and family members presentEducation:      GOALS:   Multidisciplinary Problems     Occupational Therapy Goals        Problem: Occupational Therapy Goal    Goal Priority Disciplines Outcome Interventions   Occupational Therapy Goal     OT, PT/OT Ongoing (interventions implemented as appropriate)    Description:  Goals to be met by: 8/20/2019    Patient will increase functional independence with ADLs by performing:    Feeding with Set-up Assistance.  UE Dressing with Minimal Assistance.  LE Dressing with Moderate Assistance.  Grooming while seated with Set-up Assistance.  Toileting from bedside commode with Moderate Assistance for hygiene and clothing management.   Sitting at edge of bed x20 minutes with Supervision.  Rolling to Bilateral with Modified [place].   Supine to sit with Modified [place].  Step transfer with Minimal Assistance  Toilet transfer to bedside commode with Minimal Assistance.  Increased functional  strength to WFL for ADLS.  Upper extremity exercise program 10 reps per handout, with supervision.                      Time Tracking:     OT Date of Treatment: 07/23/19  OT Start Time: 1351  OT Stop Time: 1426  OT Total Time (min): 35 mins co-tx with PT    Billable Minutes:Therapeutic Activity 17    GOOD Steve  7/23/2019

## 2019-07-23 NOTE — PROGRESS NOTES
[facility]  General Surgery  Progress Note    Subjective:     History of Present Illness:  No notes on file    Post-Op Info:  Procedure(s) (LRB):  BRONCHOSCOPY, FIBEROPTIC, Bronchoalveolar Lavage +/- biospsy (Bilateral)         Interval History: No acute events. Tolerating diet without nausea, emesis or abdominal pain. Hgb stable.    Medications:  Continuous Infusions:  Scheduled Meds:   ceFEPime (MAXIPIME) IVPB  2 g Intravenous Daily    clotrimazole  10 mg Oral 5x Daily    docusate sodium  100 mg Oral BID    doxycycline (VIBRAMYCIN) IVPB  100 mg Intravenous Q12H    DULoxetine  30 mg Oral Daily    famotidine (PF)  20 mg Intravenous Daily    folic acid  1 mg Oral Daily    furosemide  20 mg Intravenous Daily    insulin detemir U-100  12 Units Subcutaneous QHS    Lactobacillus acidoph-L.bulgar  1 tablet Oral TID WM    lidocaine (PF)  10 mL Other Once    lidocaine (PF)  4 mL Nebulization Once    lidocaine HCL 2%  2 mL Topical (Top) Once    lidocaine HCL 4%  4 mL Topical Once    metronidazole  500 mg Intravenous Q8H    polyethylene glycol  17 g Oral BID    predniSONE  15 mg Oral Daily    tamsulosin  0.4 mg Oral Daily    vancomycin (VANCOCIN) IVPB  1,000 mg Intravenous Q24H     PRN Meds:sodium chloride, sodium chloride, acetaminophen, benzocaine, dextrose 50 % in water (D50W), dextrose 50 % in water (D50W), fentaNYL, glucagon (human recombinant), glucose, glucose, glucose, glucose, HYDROcodone-acetaminophen, insulin aspart U-100, lidocaine HCL 2%, magic mouthwash (diphenhydrAMINE 12.5 mg/5 mL 20 mL, aluminum & magnesium hydroxide-simethicone (MYLANTA) 20 mL, lidocaine HCl 2% (XYLOCAINE) 20 mL) solution, midazolam, morphine, nitroGLYCERIN, ramelteon, sodium chloride 0.9%, sodium chloride 0.9%, sodium chloride 0.9%     Review of patient's allergies indicates:   Allergen Reactions    Sulfa (sulfonamide antibiotics) Hives    Ramucirumab Palpitations     Objective:     Vital Signs (Most  Recent):  Temp: 96.1 °F (35.6 °C) (07/23/19 0707)  Pulse: 75 (07/23/19 0753)  Resp: 20 (07/23/19 0707)  BP: 115/74 (07/23/19 0707)  SpO2: (!) 93 % (07/23/19 0325) Vital Signs (24h Range):  Temp:  [96.1 °F (35.6 °C)-98.7 °F (37.1 °C)] 96.1 °F (35.6 °C)  Pulse:  [73-87] 75  Resp:  [16-27] 20  SpO2:  [92 %-97 %] 93 %  BP: ()/(56-80) 115/74     Weight: 76.7 kg (169 lb 1.5 oz)  Body mass index is 24.97 kg/m².    Intake/Output - Last 3 Shifts       07/21 0700 - 07/22 0659 07/22 0700 - 07/23 0659 07/23 0700 - 07/24 0659    P.O.  150     I.V. (mL/kg)       Blood       IV Piggyback 1000 1300     Total Intake(mL/kg) 1000 (13) 1450 (18.9)     Urine (mL/kg/hr) 3735 (2) 2252 (1.2)     Stool 0 0     Total Output 3735 2252     Net -2735 -802            Stool Occurrence 1 x 2 x           Physical Exam    General: well-appearing, well nourished, afebrile  Neuro: AAOx3  Cardiac: normal rate; good perfusion  Pulm: unlabored; normal effort  Abdomen: soft, non-tender this AM, obese  Extremities: no edema      Significant Labs:  CBC:   Recent Labs   Lab 07/23/19  0530   WBC 11.49   RBC 3.13*   HGB 9.1*   HCT 27.5*      MCV 88   MCH 29.1   MCHC 33.1     CMP:   Recent Labs   Lab 07/23/19  0530   *   CALCIUM 8.5*   ALBUMIN 3.0*   PROT 5.3*   *   K 3.4*   CO2 29   CL 92*   BUN 44*   CREATININE 1.9*   ALKPHOS 70   ALT 39   AST 56*   BILITOT 1.0       Significant Diagnostics:  I have reviewed all pertinent imaging results/findings within the past 24 hours.    Assessment/Plan:     * Intraperitoneal hemorrhage  78 y.o. male   Intraperitoneal hemorrhage  - repeat CT shows relatively stable collection of blood  - IR recommend no acute interventions at this time  - continue to trend H/H  - will defer surgical intervention unless patient becomes unstable and unresponsive to fluid resuscitation/transfusion  - may need calcium and FFP if total transfusion requirements or greater than 4 PRBC units/24 hrs  - etiology bleeds  uncertain at this time  - OK for diet as his appetite dictates    General surgery will sign off. Please contact us if additional questions or deterioration in his condition.        CHARITO Rivero MD  General Surgery  [facility]

## 2019-07-23 NOTE — ASSESSMENT & PLAN NOTE
Pt transferred from [facility] with intraperitoneal hemorrhage requesting IR consult for embolization  CT abdomen: Acute approximately 15 x 4 cm hematoma within the region of the lesser sac with signs of active contrast extravasation.  IR consulted, currently no need for embolization at this time  Surgery consulted, potential for OR if vitals remain unstable  Will continue to monitor H/H

## 2019-07-23 NOTE — ASSESSMENT & PLAN NOTE
Initially presented to OSH with sepsis presentation concerning for JAIMIE PNA. S/p bronchoscopy today with no apparent structural abnormality noted. Lavage and cultures collected.     - Will follow up cultures result.   - Defer to ID regarding abx and duration. Currently on board abx - vanc, cefepime, Flagyl, Doxy. Also on antifungal for oral thrush.

## 2019-07-23 NOTE — PLAN OF CARE
Attempted to perform Virtual round, pt off unit for procedure at this time.  Will be available to intervene if needed.

## 2019-07-23 NOTE — PLAN OF CARE
"Problem: Occupational Therapy Goal  Goal: Occupational Therapy Goal  Goals to be met by: 2019    Patient will increase functional independence with ADLs by performing:    Feeding with Set-up Assistance.  UE Dressing with Minimal Assistance.  LE Dressing with Moderate Assistance.  Grooming while seated with Set-up Assistance.  Toileting from bedside commode with Moderate Assistance for hygiene and clothing management.   Sitting at edge of bed x20 minutes with Supervision.  Rolling to Bilateral with Modified [place].   Supine to sit with Modified [place].  Step transfer with Minimal Assistance  Toilet transfer to bedside commode with Minimal Assistance.  Increased functional strength to WFL for ADLS.  Upper extremity exercise program 10 reps per handout, with supervision.     Outcome: Ongoing (interventions implemented as appropriate)   Poor sitting tolerance EOB 2/2 "weakness" per pt 's report but suspect pt being dizzy; unable to get BP  while sitting EOB 2/2 error.  Supine BP after sittin/79; HR78. Continue with OT POC.      "

## 2019-07-23 NOTE — ASSESSMENT & PLAN NOTE
Consult Neuro and appreciate rec; Neurology signed off  Continue Prednisone 15mg daily  Patient to follow up with Neuro outpatient after discharge

## 2019-07-23 NOTE — ASSESSMENT & PLAN NOTE
Patient with history of A.fib in the outside facility  Currently NSR  HR goal < 120  Will continue to monitor.

## 2019-07-23 NOTE — ASSESSMENT & PLAN NOTE
Unclear etiology, concern for Pneumonia  WBC on admission 28 and patient was on pressors for hypotension  Blood cultures from outside hospital: NGTD  ID consulted: Continue Cefepime, Flagyl, Vancomycin, Doxycycline with recommendation for Pulm Consult for Bronchoscopy  Consulted Pulm for bronchoscopy for fungal cultures  7/19 Blood cultures: NGTD  WBC downtrending to 11

## 2019-07-23 NOTE — SUBJECTIVE & OBJECTIVE
Interval History: Patient continues to feel improvement. Patient is tolerating diet with no nausea or vomiting. Patient had bowel movement yesterday. Patient is continuing to work with PT/OT. Continues to have some throat pain with swallowing. Pulm will take patient for bronchoscopy today.     Review of Systems   Constitutional: Negative for activity change, appetite change, chills, fatigue and fever.   Respiratory: Negative for cough, choking, shortness of breath and wheezing.    Cardiovascular: Negative for chest pain and leg swelling.   Gastrointestinal: Negative for abdominal pain, blood in stool, constipation, diarrhea and nausea.   Skin: Negative for rash and wound.   Neurological: Positive for weakness. Negative for dizziness, light-headedness and headaches.     Objective:     Vital Signs (Most Recent):  Temp: 96.1 °F (35.6 °C) (07/23/19 0707)  Pulse: 75 (07/23/19 0753)  Resp: 20 (07/23/19 0707)  BP: 115/74 (07/23/19 0707)  SpO2: (!) 93 % (07/23/19 0325) Vital Signs (24h Range):  Temp:  [96.1 °F (35.6 °C)-98.7 °F (37.1 °C)] 96.1 °F (35.6 °C)  Pulse:  [73-87] 75  Resp:  [16-27] 20  SpO2:  [92 %-97 %] 93 %  BP: ()/(56-80) 115/74     Weight: 76.7 kg (169 lb 1.5 oz)  Body mass index is 24.97 kg/m².    Intake/Output Summary (Last 24 hours) at 7/23/2019 0846  Last data filed at 7/23/2019 0516  Gross per 24 hour   Intake 1450 ml   Output 2052 ml   Net -602 ml      Physical Exam   Constitutional: He is oriented to person, place, and time. He appears well-developed.   HENT:   Head: Normocephalic and atraumatic.   Mouth/Throat: No oropharyngeal exudate.   Eyes: EOM are normal. Right eye exhibits no discharge. Left eye exhibits no discharge. No scleral icterus.   Neck: Normal range of motion. No thyromegaly present.   Cardiovascular: Normal rate, regular rhythm, normal heart sounds and intact distal pulses. Exam reveals no gallop and no friction rub.   No murmur heard.  Trialysis Cath in the left subclavian     Pulmonary/Chest: Effort normal and breath sounds normal. No stridor. No respiratory distress. He has no wheezes. He has no rales. He exhibits no tenderness.   Abdominal: Soft. Bowel sounds are normal. He exhibits no distension. There is tenderness. There is no guarding.   Genitourinary:   Genitourinary Comments: Esparza in place   Musculoskeletal: He exhibits no edema or deformity.   Neurological: He is alert and oriented to person, place, and time.   Alert and oriented to self, place, and time.   Strength: 3/5 in bilateral upper extremities; 2/5 in the bilateral lower extremities   Skin: Skin is warm. No rash noted. No erythema.   Nursing note and vitals reviewed.      Significant Labs:   Blood Culture: No results for input(s): LABBLOO in the last 48 hours.  CBC:   Recent Labs   Lab 07/22/19 0430 07/23/19  0530   WBC 14.87* 11.49   HGB 9.0* 9.1*   HCT 26.6* 27.5*   * 163     CMP:   Recent Labs   Lab 07/22/19 0430 07/23/19  0530   * 130*   K 3.0* 3.4*   CL 91* 92*   CO2 28 29   * 222*   BUN 53* 44*   CREATININE 2.4* 1.9*   CALCIUM 8.1* 8.5*   PROT 5.1* 5.3*   ALBUMIN 2.9* 3.0*   BILITOT 0.7 1.0   ALKPHOS 66 70   AST 56* 56*   ALT 39 39   ANIONGAP 12 9   EGFRNONAA 25* 33*     Magnesium:   Recent Labs   Lab 07/22/19 0430 07/23/19  0530   MG 1.7 1.6       Significant Imaging: All imaging reviewed

## 2019-07-23 NOTE — PROGRESS NOTES
[facility] Infectious Disease Progress Note     Primary Team: Family Medicine  Consultant Attending: Alessia  Date of Admit: 7/19/2019    Summary of history     Jose Martin Hutchins is a 78 y.o. male with a relevant history of dyslipidemia, CAD, HTN, mitral regurgitation, tricuspid regurgitation, chronic renal disease, GERD, rheumatoid arthritis, and chronic inflammatory demyelinating polyneuritis.     The patient presented to [facility] on 7/19/2019 as a direct admit to the ICU from [facility] for high level of care and interventional radiology services due to suspected retroperitoneal hematoma.     The patient was initially admitted at [facility] for elective plasmapheresis planned by an outside neurologist for his worsening CIPD. During admission, he had a fever of 102 at which time urine cultures, blood cultures, and urinalysis were done. Cultures remained negative but urinalysis was suspect for UTI which was treated with vancomycin and ceftriaxone for 7 days. Per family, they report he was improving after the two initial plasma exchange treatments but acutely decompensated and became hypotensive yesterday afternoon. At this time, hemoglobin dropped from 11 to 9 and CT chest and abdomen showed upper lobe pneumonia and large hematoma in the lesser sac.     Upon arrival to the [facility] ICU, he became unresponsive while being transferred to the bed with systolic BP in the 70's. He has remained afebrile.    Interval events     Stepped down from ICU to floor 7/22.  Bronchoscopy planned for 7/23.    Subjective     Patient reports no complaints overnight. Denies any fever, chills, nausea, vomiting, or pain/discomfort.    Current Medications:     Scheduled:   ceFEPime (MAXIPIME) IVPB  2 g Intravenous Daily    clotrimazole  10 mg Oral 5x Daily    docusate sodium  100 mg Oral BID    doxycycline (VIBRAMYCIN) IVPB  100 mg Intravenous Q12H    DULoxetine  30 mg Oral Daily    famotidine (PF)  20 mg Intravenous  "Daily    folic acid  1 mg Oral Daily    furosemide  20 mg Intravenous Daily    insulin detemir U-100  12 Units Subcutaneous QHS    Lactobacillus acidoph-L.bulgar  1 tablet Oral TID WM    metronidazole  500 mg Intravenous Q8H    polyethylene glycol  17 g Oral BID    predniSONE  15 mg Oral Daily    tamsulosin  0.4 mg Oral Daily    vancomycin (VANCOCIN) IVPB  1,000 mg Intravenous Q24H        PRN:  sodium chloride, sodium chloride, acetaminophen, dextrose 50 % in water (D50W), dextrose 50 % in water (D50W), glucagon (human recombinant), glucose, glucose, glucose, glucose, HYDROcodone-acetaminophen, insulin aspart U-100, magic mouthwash (diphenhydrAMINE 12.5 mg/5 mL 20 mL, aluminum & magnesium hydroxide-simethicone (MYLANTA) 20 mL, lidocaine HCl 2% (XYLOCAINE) 20 mL) solution, morphine, nitroGLYCERIN, ramelteon, sodium chloride 0.9%, sodium chloride 0.9%, sodium chloride 0.9%    Antibiotics and Day Number of Therapy:  Vancomycin:  - current  Cefepime:  - current  Metronidazole:  - current  Doxycycline:  - current    Objective   Last 24 Hour Vital Signs:  BP  Min: 96/56  Max: 162/80  Temp  Av.7 °F (36.5 °C)  Min: 96.1 °F (35.6 °C)  Max: 98.7 °F (37.1 °C)  Pulse  Av.1  Min: 73  Max: 87  Resp  Av.9  Min: 16  Max: 28  SpO2  Av.5 %  Min: 92 %  Max: 97 %  Height  Av' 9" (175.3 cm)  Min: 5' 9" (175.3 cm)  Max: 5' 9" (175.3 cm)  Weight  Av.9 kg (169 lb 8.5 oz)  Min: 76.7 kg (169 lb 1.5 oz)  Max: 77.1 kg (169 lb 15.6 oz)    Lines, drains, airway:  Trialysis catheter - 7/10  Esparza Catheter -     Physical Examination:  Examination  General: Patient awake and working with PT/OT upon entering room in NAD.  HEENT: normocephalic, atraumatic, EOMI; shallow ulcer on the tip of tongue with grey/white base  Neck: No thyromegaly or masses   Cardiac: RRR, no murmurs appreciated, no extra heart sounds  Pulmonary/Chest: CTAB, symmetric chest rise, no wheezing or crackles  GI: Soft, no " tenderness to palpation, distended, normoactive bowel sounds  Extremities: no edema, clubbing, or cyanosis  Skin: dry, warm, intact. No bruising or rashes.  Neuro: Alert and oriented    Lab data:  CBC:   Lab Results   Component Value Date    WBC 11.49 07/23/2019    HGB 9.1 (L) 07/23/2019     07/23/2019    MCV 88 07/23/2019    RDW 17.7 (H) 07/23/2019       Estimated Creatinine Clearance: 32.6 mL/min (A) (based on SCr of 1.9 mg/dL (H)).    Microbiology Data  Blood culture (7/11) - negative  Blood culture (7/19) - negative  Respiratory culture - pending  C Diff - negative    Radiology:  CT Abdomen/Pelvis (7/18)  The liver, spleen, gallbladder appear normal.  There is a small amount of perihepatic fluid.  The pancreas and adrenal glands are unremarkable.  There is no hydronephrosis.  Multiple cysts are present in the left kidney.  There is an approximately 15 x 4 cm hematoma within the lesser sac.  Within this hematoma there are areas of increased attenuation suggesting active extravasation.  There is moderate fat stranding and hemorrhage in the region of the hepatic flexure and thickening of the right lateral conal fascia.  No bowel obstruction.  No free fluid.     CT Chest (7/18)  There is no CT evidence of pulmonary thromboembolism.  No enlarged hilar or mediastinal lymph nodes are seen.  No suspicious pulmonary nodules or masses are noted.  There is an area of pneumonia in the left upper lobe and mild dependent atelectasis in both lower lobes.  There is a trace left pleural fluid collection.    Chest X-Ray (7/22)  Medical support devices project in stable radiographic position.  Cardiomediastinal silhouette is unchanged.  There are low lung volumes bilaterally with elevation of the right hemidiaphragm.  There is continued patchy consolidation projecting over the left upper lung zone.  No definite new confluent airspace consolidation or pneumothorax appreciated.    Assessment     Jose Martin Hutchins is a 78  y.o. male with multiple comorbidities including chronic inflammatory demyelinating polyneuritis. He was transferred to [facility] for higher level of care and need for interventional radiology for a retroperitoneal hematoma.     Patient acutely ill due to combination of retroperitoneal hematoma as well as upper lobe pneumonia found on CT chest. White count spike to 30 within the past 2 days and 2 episodes of hypotension. Will cover pneumonia since patient has risk factors for nosocomial organisms and aspiration (seizure like episode).    Patient has been on prolonged courses of high dose steroids, therefore can be considered immunosuppressed. For this reason, organisms other than classic bacteria should be considered.     Recommendations     Left Upper Lobe Pneumonia  - Continue Vancomycin with goal trough 15-20 for 7 day duration  - Continue Cefepime 2g IV q24 for 7 day duration; will watch closely for mental status changes  - Continue Metronidazole 500mg IV q8h for 7 day duration  - Continue Doxycycline 100mg IV q12h for atypical coverage for 5 day duration  - Will follow culture results from bronch on 7/23    Thrush  - Continue Clotrimazole 10 mg 5 times daily  - Will need EKG to obtain QTc for possible future use of fluconazole    Thank you for the consult. Please call with any questions.    Emma Jean Baptiste MD  [facility] Internal [facility] Resident, [facility]

## 2019-07-23 NOTE — PHYSICIAN QUERY
PT Name: Jose Martin Hutchins  MR #: [id number]  Physician Query Form - CKD Clarification     CDS/: Iva Jackson               Contact information: [email or web address]    This form is a permanent document in the medical record.     Query Date: July 23, 2019    By submitting this query, we are merely seeking further clarification of documentation. Please utilize your independent clinical judgment when addressing the question(s) below.    The Medical record contains the following:     Indicators   Supporting Clinical Findings   Location in Medical Record   x CKD or Chronic Kidney (Renal) Failure / Disease CKD    ANDIE (acute kidney injury)  BUN/Cr upon admission: 54/2.8. Baseline 35/1.2.   Likely 2/2 medications vs intravascular volume depletion.   Will start IVF and continue to monitor.   Avoid nephrotoxic agents.  HM note 7/23     H&P    x BUN/Creatinine                          GFR BUN         48  -  54 - 61 -  62 -  60 -  53  - 44   Creatinine 2.0 - 2.8 -3.4 - 3.3 - 3.0 - 2.4 - 1.9   GFR          31 -  21 - 16 - 17 -   19 - 25  - 33  Labs 7/19 - 7/23     Dehydration      Nausea / Vomiting      Dialysis / CRRT      Medication      Treatment      Other Chronic Conditions      Other       Provider, please further specify the stage of CKD.    [ x  ] Chronic Kidney Disease (CKD) (please specify stage* below)      [employer] Definitions     Stage Description eGFR (mL/min)   [   ]    III Moderately reduced kidney function 30-59   [   ]    IV Severely reduced kidney function 15-29   [ x  ] Other (please specify): ___II_________    [   ]  Clinically Undetermined          Please document in your progress notes daily for the duration of treatment until resolved and include in your discharge summary.

## 2019-07-23 NOTE — SUBJECTIVE & OBJECTIVE
Interval History: No acute events. Tolerating diet without nausea, emesis or abdominal pain. Hgb stable.    Medications:  Continuous Infusions:  Scheduled Meds:   ceFEPime (MAXIPIME) IVPB  2 g Intravenous Daily    clotrimazole  10 mg Oral 5x Daily    docusate sodium  100 mg Oral BID    doxycycline (VIBRAMYCIN) IVPB  100 mg Intravenous Q12H    DULoxetine  30 mg Oral Daily    famotidine (PF)  20 mg Intravenous Daily    folic acid  1 mg Oral Daily    furosemide  20 mg Intravenous Daily    insulin detemir U-100  12 Units Subcutaneous QHS    Lactobacillus acidoph-L.bulgar  1 tablet Oral TID WM    lidocaine (PF)  10 mL Other Once    lidocaine (PF)  4 mL Nebulization Once    lidocaine HCL 2%  2 mL Topical (Top) Once    lidocaine HCL 4%  4 mL Topical Once    metronidazole  500 mg Intravenous Q8H    polyethylene glycol  17 g Oral BID    predniSONE  15 mg Oral Daily    tamsulosin  0.4 mg Oral Daily    vancomycin (VANCOCIN) IVPB  1,000 mg Intravenous Q24H     PRN Meds:sodium chloride, sodium chloride, acetaminophen, benzocaine, dextrose 50 % in water (D50W), dextrose 50 % in water (D50W), fentaNYL, glucagon (human recombinant), glucose, glucose, glucose, glucose, HYDROcodone-acetaminophen, insulin aspart U-100, lidocaine HCL 2%, magic mouthwash (diphenhydrAMINE 12.5 mg/5 mL 20 mL, aluminum & magnesium hydroxide-simethicone (MYLANTA) 20 mL, lidocaine HCl 2% (XYLOCAINE) 20 mL) solution, midazolam, morphine, nitroGLYCERIN, ramelteon, sodium chloride 0.9%, sodium chloride 0.9%, sodium chloride 0.9%     Review of patient's allergies indicates:   Allergen Reactions    Sulfa (sulfonamide antibiotics) Hives    Ramucirumab Palpitations     Objective:     Vital Signs (Most Recent):  Temp: 96.1 °F (35.6 °C) (07/23/19 0707)  Pulse: 75 (07/23/19 0753)  Resp: 20 (07/23/19 0707)  BP: 115/74 (07/23/19 0707)  SpO2: (!) 93 % (07/23/19 0325) Vital Signs (24h Range):  Temp:  [96.1 °F (35.6 °C)-98.7 °F (37.1 °C)] 96.1 °F (35.6  °C)  Pulse:  [73-87] 75  Resp:  [16-27] 20  SpO2:  [92 %-97 %] 93 %  BP: ()/(56-80) 115/74     Weight: 76.7 kg (169 lb 1.5 oz)  Body mass index is 24.97 kg/m².    Intake/Output - Last 3 Shifts       07/21 0700 - 07/22 0659 07/22 0700 - 07/23 0659 07/23 0700 - 07/24 0659    P.O.  150     I.V. (mL/kg)       Blood       IV Piggyback 1000 1300     Total Intake(mL/kg) 1000 (13) 1450 (18.9)     Urine (mL/kg/hr) 3735 (2) 2252 (1.2)     Stool 0 0     Total Output 3735 2252     Net -2735 -802            Stool Occurrence 1 x 2 x           Physical Exam    General: well-appearing, well nourished, afebrile  Neuro: AAOx3  Cardiac: normal rate; good perfusion  Pulm: unlabored; normal effort  Abdomen: soft, non-tender this AM, obese  Extremities: no edema      Significant Labs:  CBC:   Recent Labs   Lab 07/23/19  0530   WBC 11.49   RBC 3.13*   HGB 9.1*   HCT 27.5*      MCV 88   MCH 29.1   MCHC 33.1     CMP:   Recent Labs   Lab 07/23/19  0530   *   CALCIUM 8.5*   ALBUMIN 3.0*   PROT 5.3*   *   K 3.4*   CO2 29   CL 92*   BUN 44*   CREATININE 1.9*   ALKPHOS 70   ALT 39   AST 56*   BILITOT 1.0       Significant Diagnostics:  I have reviewed all pertinent imaging results/findings within the past 24 hours.

## 2019-07-23 NOTE — ASSESSMENT & PLAN NOTE
BUN/Cr upon admission: 54/2.8. Baseline 35/1.2.   Likely 2/2 medications vs intravascular volume depletion.   Will start IVF and continue to monitor.   Cr continuing to downtrend  Avoid nephrotoxic agents.

## 2019-07-23 NOTE — PLAN OF CARE
Problem: Occupational Therapy Goal  Goal: Occupational Therapy Goal  Goals to be met by: 8/20/2019    Patient will increase functional independence with ADLs by performing:    Feeding with Set-up Assistance.  UE Dressing with Minimal Assistance.  LE Dressing with Moderate Assistance.  Grooming while seated with Set-up Assistance.  Toileting from bedside commode with Moderate Assistance for hygiene and clothing management.   Sitting at edge of bed x20 minutes with Supervision.  Rolling to Bilateral with Modified [place].   Supine to sit with Modified [place].  Step transfer with Minimal Assistance  Toilet transfer to bedside commode with Minimal Assistance.  Increased functional strength to WFL for ADLS.  Upper extremity exercise program 10 reps per handout, with supervision.     Outcome: Ongoing (interventions implemented as appropriate)  Patient tolerated EOB sitting /c minimal drop in BP, but asymptomatic this date. Patient with delayed processing of commands. Patient will benefit from IPR to address functional deficits.

## 2019-07-23 NOTE — PT/OT/SLP PROGRESS
Speech Language Pathology  MISSED VISIT        Jose Martin Hutchins  MRN: [id number]    Patient not seen today secondary to MD hold/Pt NPO for bronch.  Will follow-up next date.         HANNAH Sotelo, CCC-SLP  Speech Pathologist   7/23/2019

## 2019-07-24 ENCOUNTER — TELEPHONE (OUTPATIENT)
Dept: OTOLARYNGOLOGY | Facility: CLINIC | Age: 79
End: 2019-07-24

## 2019-07-24 PROBLEM — B37.0 OROPHARYNGEAL CANDIDIASIS: Status: ACTIVE | Noted: 2019-07-24

## 2019-07-24 PROBLEM — E44.1 MILD MALNUTRITION: Status: ACTIVE | Noted: 2019-07-24

## 2019-07-24 PROBLEM — K14.0 GLOSSITIS: Status: ACTIVE | Noted: 2019-07-24

## 2019-07-24 LAB
1,3 BETA GLUCAN SPEC-MCNC: <31 PG/ML
ALBUMIN SERPL BCP-MCNC: 2.9 G/DL (ref 3.5–5.2)
ALP SERPL-CCNC: 64 U/L (ref 55–135)
ALT SERPL W/O P-5'-P-CCNC: 37 U/L (ref 10–44)
ANION GAP SERPL CALC-SCNC: 10 MMOL/L (ref 8–16)
ANISOCYTOSIS BLD QL SMEAR: ABNORMAL
APTT BLDCRRT: 35.1 SEC (ref 21–32)
AST SERPL-CCNC: 48 U/L (ref 10–40)
BACTERIA BLD CULT: NORMAL
BACTERIA BLD CULT: NORMAL
BASOPHILS # BLD AUTO: ABNORMAL K/UL (ref 0–0.2)
BASOPHILS NFR BLD: 0 % (ref 0–1.9)
BILIRUB SERPL-MCNC: 1 MG/DL (ref 0.1–1)
BUN SERPL-MCNC: 32 MG/DL (ref 8–23)
CALCIUM SERPL-MCNC: 8.3 MG/DL (ref 8.7–10.5)
CHLORIDE SERPL-SCNC: 92 MMOL/L (ref 95–110)
CO2 SERPL-SCNC: 25 MMOL/L (ref 23–29)
CREAT SERPL-MCNC: 1.5 MG/DL (ref 0.5–1.4)
DIFFERENTIAL METHOD: ABNORMAL
EOSINOPHIL # BLD AUTO: ABNORMAL K/UL (ref 0–0.5)
EOSINOPHIL NFR BLD: 0 % (ref 0–8)
ERYTHROCYTE [DISTWIDTH] IN BLOOD BY AUTOMATED COUNT: 17.8 % (ref 11.5–14.5)
EST. GFR  (AFRICAN AMERICAN): 51 ML/MIN/1.73 M^2
EST. GFR  (NON AFRICAN AMERICAN): 44 ML/MIN/1.73 M^2
GLUCOSE SERPL-MCNC: 222 MG/DL (ref 70–110)
HCT VFR BLD AUTO: 29 % (ref 40–54)
HGB BLD-MCNC: 9.5 G/DL (ref 14–18)
HSV1 DNA SPEC QL NAA+PROBE: POSITIVE
HSV2 DNA SPEC QL NAA+PROBE: NEGATIVE
HYPOCHROMIA BLD QL SMEAR: ABNORMAL
INR PPP: 1.2 (ref 0.8–1.2)
LYMPHOCYTES # BLD AUTO: ABNORMAL K/UL (ref 1–4.8)
LYMPHOCYTES NFR BLD: 0 % (ref 18–48)
MAGNESIUM SERPL-MCNC: 1.5 MG/DL (ref 1.6–2.6)
MCH RBC QN AUTO: 29.2 PG (ref 27–31)
MCHC RBC AUTO-ENTMCNC: 32.8 G/DL (ref 32–36)
MCV RBC AUTO: 89 FL (ref 82–98)
METAMYELOCYTES NFR BLD MANUAL: 1 %
MONOCYTES # BLD AUTO: ABNORMAL K/UL (ref 0.3–1)
MONOCYTES NFR BLD: 3 % (ref 4–15)
MYELOCYTES NFR BLD MANUAL: 3 %
NEUTROPHILS NFR BLD: 88 % (ref 38–73)
NEUTS BAND NFR BLD MANUAL: 5 %
OVALOCYTES BLD QL SMEAR: ABNORMAL
PHOSPHATE SERPL-MCNC: 2.1 MG/DL (ref 2.7–4.5)
PLATELET # BLD AUTO: 184 K/UL (ref 150–350)
PLATELET BLD QL SMEAR: ABNORMAL
PMV BLD AUTO: 8.9 FL (ref 9.2–12.9)
POCT GLUCOSE: 157 MG/DL (ref 70–110)
POCT GLUCOSE: 236 MG/DL (ref 70–110)
POCT GLUCOSE: 258 MG/DL (ref 70–110)
POCT GLUCOSE: 278 MG/DL (ref 70–110)
POIKILOCYTOSIS BLD QL SMEAR: SLIGHT
POLYCHROMASIA BLD QL SMEAR: ABNORMAL
POTASSIUM SERPL-SCNC: 3 MMOL/L (ref 3.5–5.1)
PROT SERPL-MCNC: 5.2 G/DL (ref 6–8.4)
PROTHROMBIN TIME: 12.9 SEC (ref 9–12.5)
RBC # BLD AUTO: 3.25 M/UL (ref 4.6–6.2)
SODIUM SERPL-SCNC: 127 MMOL/L (ref 136–145)
SPECIMEN SOURCE: ABNORMAL
WBC # BLD AUTO: 14.4 K/UL (ref 3.9–12.7)

## 2019-07-24 PROCEDURE — 94761 N-INVAS EAR/PLS OXIMETRY MLT: CPT

## 2019-07-24 PROCEDURE — 97530 THERAPEUTIC ACTIVITIES: CPT

## 2019-07-24 PROCEDURE — 99233 PR SUBSEQUENT HOSPITAL CARE,LEVL III: ICD-10-PCS | Mod: 25,,, | Performed by: OTOLARYNGOLOGY

## 2019-07-24 PROCEDURE — 92526 ORAL FUNCTION THERAPY: CPT

## 2019-07-24 PROCEDURE — 80053 COMPREHEN METABOLIC PANEL: CPT

## 2019-07-24 PROCEDURE — S0030 INJECTION, METRONIDAZOLE: HCPCS | Performed by: STUDENT IN AN ORGANIZED HEALTH CARE EDUCATION/TRAINING PROGRAM

## 2019-07-24 PROCEDURE — S0028 INJECTION, FAMOTIDINE, 20 MG: HCPCS | Performed by: STUDENT IN AN ORGANIZED HEALTH CARE EDUCATION/TRAINING PROGRAM

## 2019-07-24 PROCEDURE — 63600175 PHARM REV CODE 636 W HCPCS: Performed by: FAMILY MEDICINE

## 2019-07-24 PROCEDURE — 11000001 HC ACUTE MED/SURG PRIVATE ROOM

## 2019-07-24 PROCEDURE — 85007 BL SMEAR W/DIFF WBC COUNT: CPT

## 2019-07-24 PROCEDURE — 25000003 PHARM REV CODE 250: Performed by: STUDENT IN AN ORGANIZED HEALTH CARE EDUCATION/TRAINING PROGRAM

## 2019-07-24 PROCEDURE — 25000003 PHARM REV CODE 250: Performed by: FAMILY MEDICINE

## 2019-07-24 PROCEDURE — 31575 DIAGNOSTIC LARYNGOSCOPY: CPT | Mod: ,,, | Performed by: OTOLARYNGOLOGY

## 2019-07-24 PROCEDURE — 85610 PROTHROMBIN TIME: CPT

## 2019-07-24 PROCEDURE — 99231 SBSQ HOSP IP/OBS SF/LOW 25: CPT | Mod: ,,, | Performed by: SURGERY

## 2019-07-24 PROCEDURE — 97110 THERAPEUTIC EXERCISES: CPT

## 2019-07-24 PROCEDURE — 31575 PR LARYNGOSCOPY, FLEXIBLE; DIAGNOSTIC: ICD-10-PCS | Mod: ,,, | Performed by: OTOLARYNGOLOGY

## 2019-07-24 PROCEDURE — 83735 ASSAY OF MAGNESIUM: CPT

## 2019-07-24 PROCEDURE — 85730 THROMBOPLASTIN TIME PARTIAL: CPT

## 2019-07-24 PROCEDURE — 97803 MED NUTRITION INDIV SUBSEQ: CPT

## 2019-07-24 PROCEDURE — 99233 SBSQ HOSP IP/OBS HIGH 50: CPT | Mod: 25,,, | Performed by: OTOLARYNGOLOGY

## 2019-07-24 PROCEDURE — 99231 PR SUBSEQUENT HOSPITAL CARE,LEVL I: ICD-10-PCS | Mod: ,,, | Performed by: SURGERY

## 2019-07-24 PROCEDURE — 97535 SELF CARE MNGMENT TRAINING: CPT

## 2019-07-24 PROCEDURE — 63600175 PHARM REV CODE 636 W HCPCS: Performed by: STUDENT IN AN ORGANIZED HEALTH CARE EDUCATION/TRAINING PROGRAM

## 2019-07-24 PROCEDURE — 84100 ASSAY OF PHOSPHORUS: CPT

## 2019-07-24 PROCEDURE — 27000221 HC OXYGEN, UP TO 24 HOURS

## 2019-07-24 PROCEDURE — 85027 COMPLETE CBC AUTOMATED: CPT

## 2019-07-24 RX ORDER — VALACYCLOVIR HYDROCHLORIDE 500 MG/1
1000 TABLET, FILM COATED ORAL 2 TIMES DAILY
Status: DISCONTINUED | OUTPATIENT
Start: 2019-07-24 | End: 2019-07-25

## 2019-07-24 RX ORDER — TRIAMCINOLONE ACETONIDE 1 MG/G
PASTE DENTAL 2 TIMES DAILY
Status: DISCONTINUED | OUTPATIENT
Start: 2019-07-24 | End: 2019-07-26

## 2019-07-24 RX ORDER — MAGNESIUM SULFATE HEPTAHYDRATE 40 MG/ML
2 INJECTION, SOLUTION INTRAVENOUS ONCE
Status: COMPLETED | OUTPATIENT
Start: 2019-07-24 | End: 2019-07-24

## 2019-07-24 RX ORDER — POTASSIUM CHLORIDE 14.9 MG/ML
20 INJECTION INTRAVENOUS ONCE
Status: COMPLETED | OUTPATIENT
Start: 2019-07-24 | End: 2019-07-24

## 2019-07-24 RX ADMIN — POTASSIUM CHLORIDE 20 MEQ: 200 INJECTION, SOLUTION INTRAVENOUS at 02:07

## 2019-07-24 RX ADMIN — DOXYCYCLINE 100 MG: 100 INJECTION, POWDER, LYOPHILIZED, FOR SOLUTION INTRAVENOUS at 04:07

## 2019-07-24 RX ADMIN — LACTOBACILLUS TAB 1 TABLET: TAB at 08:07

## 2019-07-24 RX ADMIN — FAMOTIDINE 20 MG: 10 INJECTION, SOLUTION INTRAVENOUS at 08:07

## 2019-07-24 RX ADMIN — CLOTRIMAZOLE 10 MG: 10 LOZENGE ORAL; TOPICAL at 06:07

## 2019-07-24 RX ADMIN — TAMSULOSIN HYDROCHLORIDE 0.4 MG: 0.4 CAPSULE ORAL at 08:07

## 2019-07-24 RX ADMIN — FOLIC ACID 1 MG: 1 TABLET ORAL at 08:07

## 2019-07-24 RX ADMIN — CLOTRIMAZOLE 10 MG: 10 LOZENGE ORAL; TOPICAL at 11:07

## 2019-07-24 RX ADMIN — INSULIN ASPART 3 UNITS: 100 INJECTION, SOLUTION INTRAVENOUS; SUBCUTANEOUS at 06:07

## 2019-07-24 RX ADMIN — VALACYCLOVIR HYDROCHLORIDE 1000 MG: 500 TABLET, FILM COATED ORAL at 01:07

## 2019-07-24 RX ADMIN — DULOXETINE 30 MG: 30 CAPSULE, DELAYED RELEASE ORAL at 08:07

## 2019-07-24 RX ADMIN — INSULIN DETEMIR 12 UNITS: 100 INJECTION, SOLUTION SUBCUTANEOUS at 09:07

## 2019-07-24 RX ADMIN — CEFEPIME 2 G: 2 INJECTION, POWDER, FOR SOLUTION INTRAVENOUS at 12:07

## 2019-07-24 RX ADMIN — FUROSEMIDE 20 MG: 10 INJECTION, SOLUTION INTRAMUSCULAR; INTRAVENOUS at 08:07

## 2019-07-24 RX ADMIN — METRONIDAZOLE 500 MG: 500 INJECTION, SOLUTION INTRAVENOUS at 02:07

## 2019-07-24 RX ADMIN — METRONIDAZOLE 500 MG: 500 INJECTION, SOLUTION INTRAVENOUS at 11:07

## 2019-07-24 RX ADMIN — METRONIDAZOLE 500 MG: 500 INJECTION, SOLUTION INTRAVENOUS at 10:07

## 2019-07-24 RX ADMIN — LACTOBACILLUS TAB 1 TABLET: TAB at 01:07

## 2019-07-24 RX ADMIN — INSULIN ASPART 1 UNITS: 100 INJECTION, SOLUTION INTRAVENOUS; SUBCUTANEOUS at 09:07

## 2019-07-24 RX ADMIN — CEFEPIME 2 G: 2 INJECTION, POWDER, FOR SOLUTION INTRAVENOUS at 01:07

## 2019-07-24 RX ADMIN — VALACYCLOVIR HYDROCHLORIDE 1000 MG: 500 TABLET, FILM COATED ORAL at 09:07

## 2019-07-24 RX ADMIN — MAGNESIUM SULFATE IN WATER 2 G: 40 INJECTION, SOLUTION INTRAVENOUS at 08:07

## 2019-07-24 RX ADMIN — PREDNISONE 15 MG: 10 TABLET ORAL at 08:07

## 2019-07-24 RX ADMIN — SODIUM PHOSPHATE, MONOBASIC, MONOHYDRATE 20.01 MMOL: 276; 142 INJECTION, SOLUTION INTRAVENOUS at 06:07

## 2019-07-24 RX ADMIN — TRIAMCINOLONE ACETONIDE: 1 PASTE TOPICAL at 09:07

## 2019-07-24 NOTE — PROGRESS NOTES
[facility] Infectious Disease Progress Note     Primary Team: Family Medicine  Consultant Attending: Alessia  Date of Admit: 7/19/2019    Summary of history     Jose Martin Hutchins is a 78 y.o. male with a relevant history of dyslipidemia, CAD, HTN, mitral regurgitation, tricuspid regurgitation, chronic renal disease, GERD, rheumatoid arthritis, and chronic inflammatory demyelinating polyneuritis.     The patient presented to [facility] on 7/19/2019 as a direct admit to the ICU from [facility] for high level of care and interventional radiology services due to suspected retroperitoneal hematoma.     The patient was initially admitted at [facility] for elective plasmapheresis planned by an outside neurologist for his worsening CIPD. During admission, he had a fever of 102 at which time urine cultures, blood cultures, and urinalysis were done. Cultures remained negative but urinalysis was suspect for UTI which was treated with vancomycin and ceftriaxone for 7 days. Per family, they report he was improving after the two initial plasma exchange treatments but acutely decompensated and became hypotensive yesterday afternoon. At this time, hemoglobin dropped from 11 to 9 and CT chest and abdomen showed upper lobe pneumonia and large hematoma in the lesser sac.     Upon arrival to the [facility] ICU, he became unresponsive while being transferred to the bed with systolic BP in the 70's. He has remained afebrile.    Interval events     Stepped down from ICU to floor 7/22.  Bronchoscopy on 7/23.    Subjective     Patient reports no complaints overnight. Denies any fever, chills, nausea, vomiting, or pain/discomfort.    Current Medications:     Scheduled:   ceFEPime (MAXIPIME) IVPB  2 g Intravenous Q12H    clotrimazole  10 mg Oral 5x Daily    docusate sodium  100 mg Oral BID    doxycycline (VIBRAMYCIN) IVPB  100 mg Intravenous Q12H    DULoxetine  30 mg Oral Daily    famotidine (PF)  20 mg Intravenous Daily     folic acid  1 mg Oral Daily    furosemide  20 mg Intravenous Daily    insulin detemir U-100  12 Units Subcutaneous QHS    Lactobacillus acidoph-L.bulgar  1 tablet Oral TID WM    lidocaine (PF)  10 mL Other Once    lidocaine (PF)  4 mL Nebulization Once    lidocaine HCL 2%  2 mL Topical (Top) Once    metronidazole  500 mg Intravenous Q8H    polyethylene glycol  17 g Oral BID    predniSONE  15 mg Oral Daily    tamsulosin  0.4 mg Oral Daily    vancomycin (VANCOCIN) IVPB  1,000 mg Intravenous Q24H        PRN:  sodium chloride, sodium chloride, acetaminophen, benzocaine, dextrose 50 % in water (D50W), dextrose 50 % in water (D50W), fentaNYL, glucagon (human recombinant), glucose, glucose, glucose, glucose, HYDROcodone-acetaminophen, insulin aspart U-100, lidocaine HCL 2%, magic mouthwash (diphenhydrAMINE 12.5 mg/5 mL 20 mL, aluminum & magnesium hydroxide-simethicone (MYLANTA) 20 mL, lidocaine HCl 2% (XYLOCAINE) 20 mL) solution, midazolam, morphine, naloxone, nitroGLYCERIN, ramelteon, sodium chloride 0.9%, sodium chloride 0.9%, sodium chloride 0.9%    Antibiotics and Day Number of Therapy:  Vancomycin:  - current  Cefepime:  - current  Metronidazole:  - current  Doxycycline:  - current    Objective   Last 24 Hour Vital Signs:  BP  Min: 108/70  Max: 147/89  Temp  Av.5 °F (36.4 °C)  Min: 96.7 °F (35.9 °C)  Max: 98.2 °F (36.8 °C)  Pulse  Av.8  Min: 75  Max: 105  Resp  Av.2  Min: 14  Max: 22  SpO2  Av.6 %  Min: 94 %  Max: 100 %  Weight  Av.5 kg (170 lb 13.7 oz)  Min: 77.5 kg (170 lb 13.7 oz)  Max: 77.5 kg (170 lb 13.7 oz)    Lines, drains, airway:  Trialysis catheter - 7/10  Esparza Catheter -     Physical Examination:  Examination  General: Patient awake and working with PT/OT upon entering room in NAD.  HEENT: normocephalic, atraumatic, EOMI; shallow ulcer on the tip of tongue with grey/white base  Neck: No thyromegaly or masses   Cardiac: RRR, no murmurs appreciated, no  extra heart sounds  Pulmonary/Chest: CTAB, symmetric chest rise, no wheezing or crackles  GI: Soft, no tenderness to palpation, distended, normoactive bowel sounds  Extremities: no edema, clubbing, or cyanosis  Skin: dry, warm, intact. No bruising or rashes.  Neuro: Alert and oriented    Lab data:  CBC:   Lab Results   Component Value Date    WBC 14.40 (H) 07/24/2019    HGB 9.5 (L) 07/24/2019     07/24/2019    MCV 89 07/24/2019    RDW 17.8 (H) 07/24/2019       Estimated Creatinine Clearance: 41.2 mL/min (A) (based on SCr of 1.5 mg/dL (H)).    Microbiology Data  Blood culture (7/11) - negative  Blood culture (7/19) - negative  Respiratory culture - pending  Respiratory gram stain (7/23): rare WBC's, no organisms  Respiratory acid fast: negative  C Diff - negative    Radiology:  CT Abdomen/Pelvis (7/18)  The liver, spleen, gallbladder appear normal.  There is a small amount of perihepatic fluid.  The pancreas and adrenal glands are unremarkable.  There is no hydronephrosis.  Multiple cysts are present in the left kidney.  There is an approximately 15 x 4 cm hematoma within the lesser sac.  Within this hematoma there are areas of increased attenuation suggesting active extravasation.  There is moderate fat stranding and hemorrhage in the region of the hepatic flexure and thickening of the right lateral conal fascia.  No bowel obstruction.  No free fluid.     CT Chest (7/18)  There is no CT evidence of pulmonary thromboembolism.  No enlarged hilar or mediastinal lymph nodes are seen.  No suspicious pulmonary nodules or masses are noted.  There is an area of pneumonia in the left upper lobe and mild dependent atelectasis in both lower lobes.  There is a trace left pleural fluid collection.    Chest X-Ray (7/22)  Medical support devices project in stable radiographic position.  Cardiomediastinal silhouette is unchanged.  There are low lung volumes bilaterally with elevation of the right hemidiaphragm.  There is  continued patchy consolidation projecting over the left upper lung zone.  No definite new confluent airspace consolidation or pneumothorax appreciated.    Assessment     Jose Martin Hutchins is a 78 y.o. male with multiple comorbidities including chronic inflammatory demyelinating polyneuritis. He was transferred to [facility] for higher level of care and need for interventional radiology for a retroperitoneal hematoma.     Patient acutely ill due to combination of retroperitoneal hematoma as well as upper lobe pneumonia found on CT chest. White count spike to 30 within the past 2 days and 2 episodes of hypotension. Will cover pneumonia since patient has risk factors for nosocomial organisms and aspiration (seizure like episode).    Patient has been on prolonged courses of high dose steroids, therefore can be considered immunosuppressed. For this reason, organisms other than classic bacteria should be considered.     Recommendations     Left Upper Lobe Pneumonia  - Discontinue Vancomycin due to negative gram stain  - Continue Cefepime 2g IV q212h for 7 day duration; will watch closely for mental status changes  - Continue Metronidazole 500mg IV q8h for 7 day duration  - Continue Doxycycline 100mg IV q12h for atypical coverage for 5 day duration  - Will follow culture results from bronch on 7/23    Thrush  - Continue Clotrimazole 10 mg 5 times daily    Thank you for the consult. Please call with any questions.    Emma Jean Baptiste MD  [facility] Internal Medicine Resident, [facility]

## 2019-07-24 NOTE — TELEPHONE ENCOUNTER
----- Message from Kamala Yeboah sent at 7/24/2019  2:52 PM CDT -----  Contact: Carla, [facility] 5 A, [phone]  CONSULT    Room /bed number: 501  Referring MD: Dr. Gee  Diagnosis: herpes

## 2019-07-24 NOTE — PT/OT/SLP PROGRESS
"Speech Language Pathology Treatment    Patient Name:  Jose Martin Hutchins   MRN:  [id number]  Admitting Diagnosis: Intraperitoneal hemorrhage    Recommendations:                 General Recommendations:  ENT evaluation  Diet recommendations:  Puree, Liquid Diet Level: Thin   Aspiration Precautions: Upright for meals, small bites/sips, straws ok, encourage water intake and glucerna by straw   General Precautions: Standard, fall  Communication strategies:  none    Subjective     Pt seen in room, wife at bedside. SHe reports no change in oral intake and increased burning and pain when eating due to his lingual structure.   Patient goals: "I cannot eat, it hurts my tongue so bad."     Pain/Comfort:  · Pain Rating 1: 8/10  · Location 1: (tongue pain)  · Pain Addressed 1: Nurse notified, Distraction  · Pain Rating Post-Intervention 1: 8/10    Objective:     Has the patient been evaluated by SLP for swallowing?   Yes  Keep patient NPO? No   Current Respiratory Status: room air      Pt seen in room, wife at bedside.   Pt agreed to PO trials. Pt did not tolerate bites of jello (x2), mashed potatoes x3 bites with SLP. Pt reports painful swallowing and facial grimaces noted when attempting to masticate and manipulate solid and Liquifed textures. Pt with coated white tongue and ulcers all over tongue. SLP recommends continued infection diseases work-up for source of odynophagia and ENT consult for 2nd opinion on additional treatment options to relieve condition of patient's oral cavity (tongue). No sores or ulcers noted along alveolar ridge or inside of cheek (that is better than original re-assessment done on Monday, 7/22.   SLP did discuss case with Dr. Pinto and Dr. Vega s/p ST session.   SLP did answer all of wife's questions within scope of pratice.       Assessment:     Jose Martin Hutchins is a 78 y.o. male with an SLP diagnosis of odynophagia limited overall PO intake at this time.       Goals:   Multidisciplinary " Problems     SLP Goals        Problem: SLP Goal    Goal Priority Disciplines Outcome   SLP Goal     SLP Ongoing (interventions implemented as appropriate)   Description:  Short Term Goals:  1. Pt will participate in BSS to determine least restrictive diet.--ongoing  2. Pt will tolerate clear liquids PO diet with no overt s/s of aspiration. --MET 7/21  3. Pt will safely consume full liquid diet w/ thin liquids w/ no overt s/s of aspiration.  4. Pt will tolerate pureed trials with no outward s/s of dysphagia.   5. Rec: ENT consult and dietician consult to ensure added nutritional support.                         Plan:     · Patient to be seen:  3 x/week   · Plan of Care expires:  08/19/19  · Plan of Care reviewed with:  patient, spouse   · SLP Follow-Up:  Yes       Discharge recommendations:  (TBD)       Time Tracking:     SLP Treatment Date:   07/24/19  Speech Start Time:  1200  Speech Stop Time:  1219     Speech Total Time (min):  19 min    Billable Minutes: Treatment Swallowing Dysfunction 19    HANNAH Sotelo, CCC-SLP  07/24/2019

## 2019-07-24 NOTE — PLAN OF CARE
Problem: Occupational Therapy Goal  Goal: Occupational Therapy Goal  Goals to be met by: 8/20/2019    Patient will increase functional independence with ADLs by performing:    Feeding with Set-up Assistance.  UE Dressing with Minimal Assistance.  LE Dressing with Moderate Assistance.  Grooming while seated with Set-up Assistance.  Toileting from bedside commode with Moderate Assistance for hygiene and clothing management.   Sitting at edge of bed x20 minutes with Supervision.  Rolling to Bilateral with Modified [place].   Supine to sit with Modified [place].  Step transfer with Minimal Assistance  Toilet transfer to bedside commode with Minimal Assistance.  Increased functional strength to WFL for ADLS.  Upper extremity exercise program 10 reps per handout, with supervision.     Outcome: Ongoing (interventions implemented as appropriate)  Patient orthostatic upon sitting EOB, but did demonstrate improved bed mobility this date. Will benefit greatly from IPR to address functional deficits.

## 2019-07-24 NOTE — PROGRESS NOTES
"[facility]  Adult Nutrition  Progress Note    SUMMARY       Recommendations    Recommendation:   1. Continue to encourage intake of meals & supplements.     Goals:   Pt will consume at least 50-75% intake at meals  Nutrition Goal Status: (continues)  Communication of RD Recs: reviewed with RN    Reason for Assessment  Reason For Assessment: RD follow-up  Diagnosis: (intraperitoneall hemorrhage)  Relevant Medical History: afib, HTN, CAD, GERD, appendectomy, hernia repair, CABG  General Information Comments: Pt on pureed diet withBoost PLus & Boost pudding. Pt with very poor intake at meals. c/o poor appetite & sores in mouth. NFPE reevaluated today -mild wasting of thoracic region, orbitals, & upper & lower extremities  Nutrition Discharge Planning: d/c diet to be determined    Nutrition Risk Screen  Nutrition Risk Screen: no indicators present    Nutrition/Diet History  Food Preferences:  no Synagogue or cultural food prefs identified  Spiritual, Cultural Beliefs, Moravian Practices, Values that Affect Care: no  Factors Affecting Nutritional Intake: sore mouth    Anthropometrics  Temp: 98 °F (36.7 °C)  Height Method: Stated  Height: 5' 9" (175.3 cm)  Height (inches): 69 in  Weight Method: Bed Scale  Weight: 77.5 kg (170 lb 13.7 oz)  Weight (lb): 170.86 lb  Ideal Body Weight (IBW), Male: 160 lb  % Ideal Body Weight, Male (lb): 106.79 lb  BMI (Calculated): 25.3  BMI Grade: 25 - 29.9 - overweight  Usual Body Weight (UBW), k kg  % Usual Body Weight: 81.75  % Weight Change From Usual Weight: -18.42 %     Lab/Procedures/Meds  Pertinent Labs Reviewed: reviewed  Pertinent Labs Comments: Na 127L, K 3.0-L, BUN 32H, Crea 1.5H, Glu 222H, Ca 8.3L, Phos 2.1L, Alb 2.9L  Pertinent Medications Reviewed: reviewed  Pertinent Medications Comments: folic acid, Lasix, insulin, lactobacillus, prednisone    Estimated/Assessed Needs  Weight Used For Calorie Calculations: 77.1 kg (169 lb 15.6 oz)  Energy Calorie " Requirements (kcal): 2313 (30kcal/kg)  Energy Need Method: Kcal/kg  Protein Requirements: 77-92g (1.0-1.2g/kg)  Weight Used For Protein Calculations: 77.1 kg (169 lb 15.6 oz)  Estimated Fluid Requirement Method: RDA Method  RDA Method (mL): 2313     Nutrition Prescription Ordered  Current Diet Order: pureed  Oral Nutrition Supplement: Boost Plus, Boost pudding    Evaluation of Received Nutrient/Fluid Intake  I/O: 525/2350  Energy Calories Required: not meeting needs  Protein Required: not meeting needs  Fluid Required: not meeting needs  Comments: LBM 7/23  % Intake of Estimated Energy Needs: 0 - 25 %  % Meal Intake: 0 - 25 %    Nutrition Risk  Level of Risk/Frequency of Follow-up: (2xweekly)     Assessment and Plan    Mild malnutrition  Malnutrition in the context of Acute Illness/Injury    Related to (etiology):  dx    Signs and Symptoms (as evidenced by):  Energy Intake: <50% of estimated energy requirement for few months  Body Fat Depletion: mild depletion of orbitals, triceps and thoracic and lumbar region   Muscle Mass Depletion: mild depletion of lower extremities   Weight Loss: 18% x 3 months     Interventions:  Commercial beverage    Nutrition Diagnosis Status:  New       Monitor and Evaluation  Food and Nutrient Intake: food and beverage intake  Food and Nutrient Adminstration: diet order  Physical Activity and Function: nutrition-related ADLs and IADLs  Anthropometric Measurements: weight  Biochemical Data, Medical Tests and Procedures: electrolyte and renal panel  Nutrition-Focused Physical Findings: overall appearance     Malnutrition Assessment  Weight Loss (Malnutrition): greater than 7.5% in 3 months   Orbital Region (Subcutaneous Fat Loss): mild depletion  Upper Arm Region (Subcutaneous Fat Loss): mild depletion  Thoracic and Lumbar Region: mild depletion   Anterior Thigh Region (Muscle Loss): mild depletion  Posterior Calf Region (Muscle Loss): mild depletion   Subcutaneous Fat Loss (Final  Summary): mild protein-calorie malnutrition  Muscle Loss Evaluation (Final Summary): mild protein-calorie malnutrition    Severe Weight Loss (Malnutrition): greater than 7.5% in 3 months    Nutrition Follow-Up  RD Follow-up?: Yes

## 2019-07-24 NOTE — PT/OT/SLP PROGRESS
Physical Therapy Treatment    Patient Name:  Jose Martin Hutchins   MRN:  [id number]    Recommendations:     Discharge Recommendations:  (TBD)   Discharge Equipment Recommendations: wheelchair, manual, hospital bed, lift device   Barriers to discharge: None    Assessment:     Jose Martin Hutchins is a 78 y.o. male admitted with a medical diagnosis of Intraperitoneal hemorrhage.  He presents with the following impairments/functional limitations:  weakness, impaired endurance, gait instability, impaired functional mobilty, impaired self care skills, impaired balance, decreased upper extremity function, decreased lower extremity function, impaired cognition, decreased coordination, decreased safety awareness, decreased ROM, impaired skin, impaired coordination Patient with delayed processing of commands; progressing with mobility; minimal drop in BP upon sitting but pt asymptomatic; cont with POC.   .    Rehab Prognosis: Fair+; patient would benefit from acute skilled PT services to address these deficits and reach maximum level of function.    Recent Surgery: Procedure(s) (LRB):  BRONCHOSCOPY, FIBEROPTIC, Bronchoalveolar Lavage +/- biospsy (Bilateral) 1 Day Post-Op    Plan:     During this hospitalization, patient to be seen 6 x/week to address the identified rehab impairments via gait training, therapeutic activities, therapeutic exercises, neuromuscular re-education and progress toward the following goals:    · Plan of Care Expires:  07/20/19    Subjective     Pain/Comfort:  · Pain Rating 1: 0/10  · Pain Rating Post-Intervention 1: 0/10      Objective:     Communicated with nurse prior to session.  Patient found with bed alarm, peripheral IV, telemetry upon PT entry to room.     General Precautions: Standard, fall, aspiration   Orthopedic Precautions:N/A   Braces: N/A     Functional Mobility:  · Bed Mobility:     · Rolling Left:  minimum assistance, moderate assistance and use of side rail  · Rolling Right: minimum  assistance, moderate assistance and use of side rail  · Scooting: moderate assistance and to EOB; max A of 2 laterally toward HOB while seated EOB  · Supine to Sit: moderate assistance and use of side rail with increased VCs  · Sit to Supine: maximal assistance and of 2 persons      AM-PAC 6 CLICK MOBILITY  Turning over in bed (including adjusting bedclothes, sheets and blankets)?: 2  Sitting down on and standing up from a chair with arms (e.g., wheelchair, bedside commode, etc.): 2  Moving from lying on back to sitting on the side of the bed?: 2  Moving to and from a bed to a chair (including a wheelchair)?: 2  Need to walk in hospital room?: 1  Climbing 3-5 steps with a railing?: 1  Basic Mobility Total Score: 10       Therapeutic Activities and Exercises:   Patient performed bed mobility as described above with increased time, effort and VCs for all transitions; pt sat EOB x 25 minutes with modA to SBA 2/2 posterior lean and lift of B feet off floor; proprioceptive input to BLEs to keep feet on floor; pt performed core/postural work while EOB- received tactile/VCs for assuming upright posture, head lift and shld retraction and forward bend at the hips along with proprioceptive input at BLEs; difficulty with scooting laterally toward HOB 2/2 decreased follow through of commands; pt placed in modified chair position in bed.    Patient left HOB elevated to 60* with all lines intact, call button in reach, bed alarm on, nurse notified and family present..    GOALS:   Multidisciplinary Problems     Physical Therapy Goals        Problem: Physical Therapy Goal    Goal Priority Disciplines Outcome Goal Variances Interventions   Physical Therapy Goal     PT, PT/OT Ongoing (interventions implemented as appropriate)     Description:  Goals to be met by: 2019     Patient will increase functional independence with mobility by performin. Supine to sit with MInimal Assistance  2. Rolling to Left and Right with  Minimal Assistance.  3. Sit to stand transfer with Moderate Assistance  3. Bed to chair transfer with Moderate Assistance using Rolling Walker  4. Sitting at edge of bed x10 minutes with Minimal Assistance  3. Lower extremity exercise program x12 reps per handout, with assistance as needed                      Time Tracking:     PT Received On: 07/23/19  PT Start Time: 1351     PT Stop Time: 1426  PT Total Time (min): 35 min with OT    Billable Minutes: Therapeutic Activity 17 minutes    Treatment Type: Treatment  PT/PTA: PT     PTA Visit Number: 0     Isabela Tinoco, PT  07/23/2019

## 2019-07-24 NOTE — PROCEDURES
"Jose Martin Hutchins is a 78 y.o. male patient.    Temp: 98 °F (36.7 °C) (07/24/19 1219)  Pulse: 100 (07/24/19 1559)  Resp: 14 (07/24/19 1219)  BP: 105/64 (07/24/19 1219)  SpO2: 95 % (07/24/19 1610)  Weight: 77.5 kg (170 lb 13.7 oz) (07/24/19 1219)  Height: 5' 9" (175.3 cm) (07/24/19 1219)       Procedures     Due to indication in patient's history, presentation or risk factors,  a fiber optic exam was performed.    SEPARATE PROCEDURE NOTE:    ANESTHESIA:  Topical xylocaine with brijesh-synephrine    FINDINGS:  White patches on epiglottis; posterolateral pharyngeal wall and hypopharynx.      PROCEDURE:  After verbal consent was obtained, the flexible scope was passed through the patient's nasal cavity without difficulty.  The nasopharynx (adenoid pad) and eustachian tube orifices were first visualized and were found to be normal, without masses or irregularity.  The posterior pharyngeal wall and base of tongue were then examined and no mass or irregular tissue was seen. There were white patches present.  The scope was then advanced to the larynx, and the epiglottis, valleculae, and piriform sinuses. There were white patches on the epiglottis(laryngeal surface) and in bilateral piriform sinuses and post-cricoid area.  The false vocal folds and true vocal folds were then examined and were found to have normal mobility (full abduction and adduction) and no masses or mucosal irregularity was seen.  The arytenoids and the interarytenoid area were normal. The patient tolerated the procedure well without complications.       Maddie Borja  7/24/2019    "

## 2019-07-24 NOTE — ASSESSMENT & PLAN NOTE
Pt transferred from [facility] with intraperitoneal hemorrhage requesting IR consult for embolization  CT abdomen: Acute approximately 15 x 4 cm hematoma within the region of the lesser sac with signs of active contrast extravasation.  IR consulted, currently no need for embolization at this time  Surgery consulted, signed off  Will continue to monitor H/H  H/H stable

## 2019-07-24 NOTE — PT/OT/SLP PROGRESS
Physical Therapy      Patient Name:  Jose Martin Hutchins   MRN:  [id number]    Patient still drowsy/lethargic from endoscopy; will see in PM.    Isabeal Tinoco, PT   7/23/2019

## 2019-07-24 NOTE — ASSESSMENT & PLAN NOTE
FFL is consistent with findings of thrush.  Consider changing Clotrimazole to Nystatin 100,00U/ml 6ml PO swish and swallow QID and PO/IV diflucan due to factors of severity of thrush, patient's immunocompromised state secondary to chronic steroid usage, and currently on broad spectrum antibiotics.  Continue current topical analgesics. Recommend discontinuing triamcinolone paste.

## 2019-07-24 NOTE — PLAN OF CARE
Problem: Adult Inpatient Plan of Care  Goal: Plan of Care Review  Outcome: Ongoing (interventions implemented as appropriate)  Pt remains alert and disoriented to place, time. Underwent bronchoscopy today, tolerated well. No complaints of pain this shift. States improvement of burning sensation in mouth, increased appetite today. Good output. Worked well with PT/OT. VSS. Safety precautions maintained.       89 yo lady with acute pyelo. Improving

## 2019-07-24 NOTE — PLAN OF CARE
Problem: Physical Therapy Goal  Goal: Physical Therapy Goal  Goals to be met by: 2019     Patient will increase functional independence with mobility by performin. Supine to sit with MInimal Assistance  2. Rolling to Left and Right with Minimal Assistance.  3. Sit to stand transfer with Moderate Assistance  3. Bed to chair transfer with Moderate Assistance using Rolling Walker  4. Sitting at edge of bed x10 minutes with Minimal Assistance  3. Lower extremity exercise program x12 reps per handout, with assistance as needed     Outcome: Ongoing (interventions implemented as appropriate)  Patient with delayed processing of commands; progressing with mobility; minimal drop in BP upon sitting but pt asymptomatic; cont with POC.

## 2019-07-24 NOTE — ASSESSMENT & PLAN NOTE
Unclear etiology, concern for Pneumonia  WBC on admission 28 and patient was on pressors for hypotension  Blood cultures from outside hospital: NGTD  ID consulted: Continue Cefepime, Flagyl, Vancomycin, Doxycycline  Consulted Pulm for bronchoscopy for fungal cultures  AFB stain: Negative, Fungal cultures are still in process  7/19 Blood cultures: NGTD  WBC downtrending

## 2019-07-24 NOTE — PLAN OF CARE
Problem: Oral Intake Inadequate  Goal: Improved Oral Intake  Outcome: Ongoing (interventions implemented as appropriate)  Recommendation:   1. Continue to encourage intake of meals & supplements.     Goals:   Pt will consume at least 50-75% intake at meals  Nutrition Goal Status: (continues)  Communication of RD Recs: reviewed with RN

## 2019-07-24 NOTE — PLAN OF CARE
Problem: SLP Goal  Goal: SLP Goal  Short Term Goals:  1. Pt will participate in BSS to determine least restrictive diet.--ongoing  2. Pt will tolerate clear liquids PO diet with no overt s/s of aspiration. --MET 7/21  3. Pt will safely consume full liquid diet w/ thin liquids w/ no overt s/s of aspiration.  4. Pt will tolerate pureed trials with no outward s/s of dysphagia.   5. Rec: ENT consult and dietician consult to ensure added nutritional support.        Outcome: Ongoing (interventions implemented as appropriate)  Pt seen in room, he presents with continued odynophagia and difficulty tolerating PO textures including (jello and mashed potatoes). Wife at bedside reports that she is concerned about his dcr'd nutrition. SLP spoke with primary team re: ENT consult. Dietician is also on board following pt's oral intake. Will cont with POC goals at this time.

## 2019-07-24 NOTE — PLAN OF CARE
Problem: Physical Therapy Goal  Goal: Physical Therapy Goal  Goals to be met by: 2019     Patient will increase functional independence with mobility by performin. Supine to sit with MInimal Assistance  2. Rolling to Left and Right with Minimal Assistance.  3. Sit to stand transfer with Moderate Assistance  3. Bed to chair transfer with Moderate Assistance using Rolling Walker  4. Sitting at edge of bed x10 minutes with Minimal Assistance  3. Lower extremity exercise program x12 reps per handout, with assistance as needed     Outcome: Ongoing (interventions implemented as appropriate)  Patient was orthostatic upon first trial sitting; after recovery, placed bed in chair position for UE/LE exercise; pt then able to sit at EOB x 10 minutes and work on posture/trunk control; cont with POC.

## 2019-07-24 NOTE — PLAN OF CARE
Problem: Adult Inpatient Plan of Care  Goal: Plan of Care Review  Outcome: Ongoing (interventions implemented as appropriate)  Patient is alert. Spouse at bedside. No complaints of pain or discomfort during this shift. Medications given per MAR. Blood glucose monitored. Central line precautions and safety maintained.

## 2019-07-24 NOTE — PROGRESS NOTES
Pharmacokinetic Assessment Follow Up: IV Vancomycin    Vancomycin serum concentration assessment(s):    The trough level was drawned correctly and can be used to guide therapy at this time.    Vancomycin Regimen Plan:    Change regimen to Vancomycin 750 mg IV every 24hour with next serum trough concentration measured at 1830 prior to 3rd dose on 7/26     Pharmacy will continue to follow and monitor vancomycin.    Please contact pharmacy at extension [phone] for questions regarding this assessment.    Thank you for the consult,   Carolina Piper     Patient brief summary:  Jose Martin Hutchins is a 78 y.o. male initiated on antimicrobial therapy with IV Vancomycin for treatment of suspected bacteremia    The patient received a loading dose, followed by the current treatment regimen: vancomycin 1000 mg IV every 24 hours    Drug Allergies:   Review of patient's allergies indicates:   Allergen Reactions    Sulfa (sulfonamide antibiotics) Hives    Ramucirumab Palpitations       Actual Body Weight:   77kg    Renal Function:   Estimated Creatinine Clearance: 41.2 mL/min (A) (based on SCr of 1.5 mg/dL (H)).,     Dialysis Method (if applicable):  none     CBC (last 72 hours):  Recent Labs   Lab Result Units 07/22/19  0430 07/23/19  0530 07/24/19  0625   WBC K/uL 14.87* 11.49 14.40*   Hemoglobin g/dL 9.0* 9.1* 9.5*   Hematocrit % 26.6* 27.5* 29.0*   Platelets K/uL 147* 163 184   Gran% % 88.0* 92.9* 88.0*   Lymph% % 10.0* 1.9* 0.0*   Mono% % 1.0* 5.1 3.0*   Eosinophil% % 0.0 0.0 0.0   Basophil% % 0.0 0.1 0.0   Differential Method  Manual Automated Manual       Metabolic Panel (last 72 hours):  Recent Labs   Lab Result Units 07/22/19  0430 07/23/19  0530 07/24/19  0625   Sodium mmol/L 131* 130* 127*   Potassium mmol/L 3.0* 3.4* 3.0*   Chloride mmol/L 91* 92* 92*   CO2 mmol/L 28 29 25   Glucose mg/dL 161* 222* 222*   BUN, Bld mg/dL 53* 44* 32*   Creatinine mg/dL 2.4* 1.9* 1.5*   Albumin g/dL 2.9* 3.0* 2.9*   Total Bilirubin mg/dL 0.7  1.0 1.0   Alkaline Phosphatase U/L 66 70 64   AST U/L 56* 56* 48*   ALT U/L 39 39 37   Magnesium mg/dL 1.7 1.6 1.5*   Phosphorus mg/dL 4.5 3.5 2.1*       Vancomycin Administrations:  vancomycin given in the last 96 hours                     vancomycin in dextrose 5 % 1 gram/250 mL IVPB 1,000 mg (mg) 1,000 mg New Bag 07/23/19 1930     1,000 mg New Bag 07/22/19 1711    vancomycin in dextrose 5 % 1 gram/250 mL IVPB 1,000 mg (mg) 1,000 mg New Bag 07/21/19 1027                      Drug levels (last 3 results):  Recent Labs   Lab Result Units 07/22/19  1015 07/23/19  1834   Vancomycin-Trough ug/mL 22.6* 20.8       Microbiologic Results:  Microbiology Results (last 7 days)       Procedure Component Value Units Date/Time    Fungus culture [[id number]] Collected:  07/23/19 1005    Order Status:  Completed Specimen:  Respiratory from BAL, JAIMIE Updated:  07/24/19 1045     Fungus (Mycology) Culture Culture in progress    Narrative:       Bronchial Wash    Culture, Respiratory [[id number]] Collected:  07/23/19 1005    Order Status:  Completed Specimen:  Respiratory from BAL, JAIMIE Updated:  07/24/19 1024     Respiratory Culture No Growth     Gram Stain (Respiratory) <10 epithelial cells per low power field.     Gram Stain (Respiratory) Rare WBC's     Gram Stain (Respiratory) No organisms seen    Blood culture [[id number]] Collected:  07/19/19 1349    Order Status:  Completed Specimen:  Blood Updated:  07/23/19 1822     Blood Culture, Routine No Growth to date      No Growth to date      No Growth to date      No Growth to date      No Growth to date    Narrative:       Collection has been rescheduled by ESS at 07/19/2019 12:08 Reason:   Unable to collect  07/19/2019 13:49 left hand  Collection has been rescheduled by ESS at 07/19/2019 12:08 Reason:   Unable to collect  07/19/2019 13:49 left hand    Blood culture [[id number]] Collected:  07/19/19 1403    Order Status:  Completed Specimen:  Blood Updated:  07/23/19 1822     Blood  Culture, Routine No Growth to date      No Growth to date      No Growth to date      No Growth to date      No Growth to date    Narrative:       Collection has been rescheduled by ESS at 07/19/2019 12:08 Reason:   Unable to collect  Collection has been rescheduled by ESS at 07/19/2019 12:08 Reason:   Unable to collect    Direct AFB stain [[id number]] Collected:  07/23/19 1005    Order Status:  Completed Specimen:  Respiratory from BAL, JAIMIE Updated:  07/23/19 1708     Direct Acid Fast No acid fast bacilli seen.    AFB Culture & Smear [[id number]] Collected:  07/23/19 1005    Order Status:  Sent Specimen:  Respiratory from BAL, JAIMIE Updated:  07/23/19 1328    Gram stain [[id number]] Collected:  07/23/19 1006    Order Status:  Canceled Specimen:  Body Fluid from Lung, JAIMIE     Fungus culture [[id number]]     Order Status:  Canceled Specimen:  Respiratory from BAL, JAIMIE     Culture, Anaerobe [[id number]]     Order Status:  Canceled Specimen:  Body Fluid from Lung, JAIMIE     Aerobic culture [[id number]]     Order Status:  Canceled Specimen:  Body Fluid from Lung, JAIMIE     AFB Culture & Smear [[id number]]     Order Status:  Canceled Specimen:  Respiratory from BAL, JAIMIE     Culture, Respiratory with Gram Stain [[id number]]     Order Status:  Canceled Specimen:  Respiratory from Sputum, Expectorated     Gram stain [[id number]]     Order Status:  Canceled Specimen:  Sputum

## 2019-07-24 NOTE — HPI
Jose Martin Hutchins is a 78 y.o. male with a relevant history of dyslipidemia, CAD, HTN, mitral regurgitation, tricuspid regurgitation, chronic renal disease, GERD, rheumatoid arthritis, and chronic inflammatory demyelinating polyneuritis.  He was admitted to [facility] on 7/19/2019 as a direct admit to the ICU from [facility] for high level of care and interventional radiology services due to suspected retroperitoneal hematoma.   The patient was initially admitted at [facility] for elective plasmapheresis planned by an outside neurologist for his worsening CIPD. During admission, he had a fever of 102 at which time urine cultures, blood cultures, and urinalysis were done. Cultures remained negative but urinalysis was suspect for UTI which was treated with vancomycin and ceftriaxone for 7 days. Per family, they report he was improving after the two initial plasma exchange treatments but acutely decompensated and became hypotensive yesterday afternoon. At this time, he was found to have upper lobe pneumonia and large hematoma in the lesser sac. He is on broad spectrum antibiotics to cover for nosocomial versus aspiration PNA. He is currently on Cefipime, Flagyl, vancomycin, doxycycline, Flagyl, and Prednisone.  I have been consulted to evaluate patient for tongue pain and odynophagia.  He reports that he started having tongue pain and white coating on his mouth while at [facility]. He reports that the pain of the tongue and odynophagia are gradually worsening.  He has had significantly decreased PO intake due to the pain.  He was started on Clotrimazole troches 2 days ago.  In review of SLP note there appears to be some improvement in alveolar ridge and buccal mucosal lesions on assessment compared to 7/22.

## 2019-07-24 NOTE — PT/OT/SLP PROGRESS
Physical Therapy Treatment    Patient Name:  Jose Martin Hutchins   MRN:  [id number]    Recommendations:     Discharge Recommendations:  rehabilitation facility(TBD)   Discharge Equipment Recommendations: wheelchair, manual, hospital bed, lift device   Barriers to discharge: None    Assessment:     Jose Martin Hutchins is a 78 y.o. male admitted with a medical diagnosis of Intraperitoneal hemorrhage.  He presents with the following impairments/functional limitations:  weakness, impaired endurance, gait instability, impaired functional mobilty, impaired self care skills, impaired balance, impaired cognition, decreased upper extremity function, decreased lower extremity function, decreased coordination, decreased safety awareness, decreased ROM, impaired skin, impaired coordination, impaired cardiopulmonary response to activity Patient was orthostatic upon first trial sitting; after recovery, placed bed in chair position for UE/LE exercise; pt then able to sit at EOB x 10 minutes and work on posture/trunk control; cont with POC.    Rehab Prognosis: Good; patient would benefit from acute skilled PT services to address these deficits and reach maximum level of function.    Recent Surgery: Procedure(s) (LRB):  BRONCHOSCOPY, FIBEROPTIC, Bronchoalveolar Lavage +/- biospsy (Bilateral) 2 Days Post-Op    Plan:     During this hospitalization, patient to be seen 6 x/week to address the identified rehab impairments via gait training, therapeutic activities, therapeutic exercises, neuromuscular re-education and progress toward the following goals:    · Plan of Care Expires:  07/20/19    Subjective     Pain/Comfort:  · Pain Rating 1: 0/10  · Pain Rating Post-Intervention 1: 0/10      Objective:     Communicated with nurse prior to session.  Patient found with bed alarm, telemetry, peripheral IV upon PT entry to room.     General Precautions: Standard, fall   Orthopedic Precautions:N/A   Braces: N/A     Functional Mobility:  · Bed  Mobility:     · Rolling Right: contact guard assistance  · Scooting: stand by assistance and toward HOB with bed in trendelenburg; and SBA toward HOB in sitting with increased time  · Bridging: stand by assistance  · Supine to Sit: minimum assistance and moderate assistance  · Sit to Supine: total assistance, of 2 persons and on first trial 2/2 decreased BP; max A on 2nd trial      AM-PAC 6 CLICK MOBILITY  Turning over in bed (including adjusting bedclothes, sheets and blankets)?: 3  Sitting down on and standing up from a chair with arms (e.g., wheelchair, bedside commode, etc.): 2  Moving from lying on back to sitting on the side of the bed?: 2  Moving to and from a bed to a chair (including a wheelchair)?: 2  Need to walk in hospital room?: 1  Climbing 3-5 steps with a railing?: 1  Basic Mobility Total Score: 11       Therapeutic Activities and Exercises:   Patient performed skills as described above with VCs and increased time and effort to complete; supine /70 HR 89 O2 sats 96%; pt scooted to EOB with SBA; pt sitting~5 min and became dizzy, BP decreased to 66/48 and immediately placed in supine with trendelenburg; /87 HR 87; pt then placed bed in chair position; /74 HR 94; pt performed BLE AROM ex 2 x 5 reps each TKEs, heel slides, hip abd/add, hip IR/ER; ankle pumps x 10 each; pt moved to EOB once more and scooted to EOB placing feet on floor and placing hands on thighs; worked in posture/core while EOC for forward bend at hips, head lift, chest lift, shld retraction and proprioceptive input through BLEs; 2 x 5 reps forward fl/ext combined with previously mentioned postural components; after 10 minutes, pt fatigued and returned to supine with maxA; then scooted to HOB with SBA and bed in trendelenburg; returned to chair position.    Patient left with bed in chair position with all lines intact, call button in reach, bed alarm on, nurse notified and wife present..    GOALS:   Multidisciplinary  Problems     Physical Therapy Goals        Problem: Physical Therapy Goal    Goal Priority Disciplines Outcome Goal Variances Interventions   Physical Therapy Goal     PT, PT/OT Ongoing (interventions implemented as appropriate)     Description:  Goals to be met by: 2019     Patient will increase functional independence with mobility by performin. Supine to sit with MInimal Assistance  2. Rolling to Left and Right with Minimal Assistance.  3. Sit to stand transfer with Moderate Assistance  3. Bed to chair transfer with Moderate Assistance using Rolling Walker  4. Sitting at edge of bed x10 minutes with Minimal Assistance  3. Lower extremity exercise program x12 reps per handout, with assistance as needed                      Time Tracking:     PT Received On: 19  PT Start Time: 1019     PT Stop Time: 1116  PT Total Time (min): 57 min with OT    Billable Minutes: Therapeutic Activity 15 minutes and Therapeutic Exercise 15 minutes    Treatment Type: Treatment  PT/PTA: PT     PTA Visit Number: 0     Isabela Tinoco, PT  2019

## 2019-07-24 NOTE — CONSULTS
[facility]  Otorhinolaryngology-Head & Neck Surgery  Consult Note    Patient Name: Jose Martin Hutchins  MRN: [id number]  Code Status: Full Code  Admission Date: 7/19/2019  Hospital Length of Stay: 5 days  Attending Physician: Al Boone III, MD  Primary Care Provider: Sam Washington MD    Patient information was obtained from patient and past medical records.     Consults  Subjective:     CC: tongue pain/odynophagia.    History of Present Illness: Jose Martin Hutchins is a 78 y.o. male with a relevant history of dyslipidemia, CAD, HTN, mitral regurgitation, tricuspid regurgitation, chronic renal disease, GERD, rheumatoid arthritis, and chronic inflammatory demyelinating polyneuritis.  He was admitted to [facility] on 7/19/2019 as a direct admit to the ICU from [facility] for high level of care and interventional radiology services due to suspected retroperitoneal hematoma.   The patient was initially admitted at [facility] for elective plasmapheresis planned by an outside neurologist for his worsening CIPD. During admission, he had a fever of 102 at which time urine cultures, blood cultures, and urinalysis were done. Cultures remained negative but urinalysis was suspect for UTI which was treated with vancomycin and ceftriaxone for 7 days. Per family, they report he was improving after the two initial plasma exchange treatments but acutely decompensated and became hypotensive yesterday afternoon. At this time, he was found to have upper lobe pneumonia and large hematoma in the lesser sac. He is on broad spectrum antibiotics to cover for nosocomial versus aspiration PNA. He is currently on Cefipime, Flagyl, vancomycin, doxycycline, Flagyl, and Prednisone.  I have been consulted to evaluate patient for tongue pain and odynophagia.  He reports that he started having tongue pain and white coating on his mouth while at [facility]. He reports that the pain of the tongue and odynophagia  are gradually worsening.  He has had significantly decreased PO intake due to the pain.  He was started on Clotrimazole troches 2 days ago.  In review of SLP note there appears to be some improvement in alveolar ridge and buccal mucosal lesions on assessment compared to 7/22.       Medications:  Continuous Infusions:  Scheduled Meds:   ceFEPime (MAXIPIME) IVPB  2 g Intravenous Q12H    clotrimazole  10 mg Oral 5x Daily    docusate sodium  100 mg Oral BID    doxycycline (VIBRAMYCIN) IVPB  100 mg Intravenous Q12H    DULoxetine  30 mg Oral Daily    famotidine (PF)  20 mg Intravenous Daily    folic acid  1 mg Oral Daily    furosemide  20 mg Intravenous Daily    insulin detemir U-100  12 Units Subcutaneous QHS    Lactobacillus acidoph-L.bulgar  1 tablet Oral TID WM    lidocaine (PF)  10 mL Other Once    lidocaine (PF)  4 mL Nebulization Once    lidocaine HCL 2%  2 mL Topical (Top) Once    metronidazole  500 mg Intravenous Q8H    polyethylene glycol  17 g Oral BID    potassium chloride in water  20 mEq Intravenous Once    predniSONE  15 mg Oral Daily    sodium phosphate IVPB  20.01 mmol Intravenous Once    tamsulosin  0.4 mg Oral Daily    triamcinolone acetonide 0.1%   Mouth/Throat BID    valACYclovir  1,000 mg Oral BID    vancomycin (VANCOCIN) IVPB  750 mg Intravenous Q24H     PRN Meds:sodium chloride, sodium chloride, acetaminophen, benzocaine, dextrose 50 % in water (D50W), dextrose 50 % in water (D50W), fentaNYL, glucagon (human recombinant), glucose, glucose, glucose, glucose, HYDROcodone-acetaminophen, insulin aspart U-100, lidocaine HCL 2%, magic mouthwash (diphenhydrAMINE 12.5 mg/5 mL 20 mL, aluminum & magnesium hydroxide-simethicone (MYLANTA) 20 mL, lidocaine HCl 2% (XYLOCAINE) 20 mL) solution, midazolam, morphine, naloxone, nitroGLYCERIN, ramelteon, sodium chloride 0.9%, sodium chloride 0.9%, sodium chloride 0.9%     No current facility-administered medications on file prior to encounter.       Current Outpatient Medications on File Prior to Encounter   Medication Sig    aspirin (ECOTRIN) 81 MG EC tablet Take 81 mg by mouth once daily.    dextrose 5 % SolP 250 mL with norepinephrine 1 mg/mL Soln 4 mg Inject 1.698 mcg/min into the vein continuous.    dicyclomine (BENTYL) 10 MG capsule Take 10 mg by mouth 3 (three) times daily as needed.    diphenoxylate-atropine 2.5-0.025 mg (LOMOTIL) 2.5-0.025 mg per tablet Take 1 tablet by mouth 4 (four) times daily as needed for Diarrhea.    DULoxetine (CYMBALTA) 30 MG capsule Take 30 mg by mouth once daily.    fluconazole (DIFLUCAN) 100 MG tablet Take 1 tablet (100 mg total) by mouth once daily. for 6 days    folic acid (FOLVITE) 1 MG tablet Take 1 mg by mouth once daily.    gabapentin (NEURONTIN) 600 MG tablet Take 600 mg by mouth 3 (three) times daily.    hydroCHLOROthiazide (HYDRODIURIL) 25 MG tablet Take 1 tablet (25 mg total) by mouth once daily. for 6 days    Lactobacillus rhamnosus GG (CULTURELLE) 10 billion cell capsule Take 1 capsule by mouth once daily.    metoprolol succinate (TOPROL-XL) 25 MG 24 hr tablet Take 1 tablet (25 mg total) by mouth once daily.    nitroGLYCERIN (NITROSTAT) 0.4 MG SL tablet Place 0.4 mg under the tongue every 5 (five) minutes as needed for Chest pain.    omega-3 acid ethyl esters (LOVAZA) 1 gram capsule Take 2 g by mouth 2 (two) times daily.    oxybutynin (DITROPAN) 5 MG Tab Take 5 mg by mouth 2 (two) times daily.    pantoprazole (PROTONIX) 40 MG tablet Take 1 tablet (40 mg total) by mouth once daily.    piperacillin sodium/tazobactam (PIPERACILLIN-TAZOBACTAM 3.375G/50ML D5W, READY TO MIX,) Inject 50 mLs (3.375 g total) into the vein every 8 (eight) hours.    tamsulosin (FLOMAX) 0.4 mg Cap Take 0.4 mg by mouth once daily.    vitamin D (VITAMIN D3) 1000 units Tab Take 1,000 Units by mouth once daily.       Review of patient's allergies indicates:   Allergen Reactions    Sulfa (sulfonamide antibiotics) Hives     Ramucirumab Palpitations       Past Medical History:   Diagnosis Date    A-fib     Coronary artery disease     GERD (gastroesophageal reflux disease)     Hypertension     Kidney function abnormal     Rheumatoid arthritis      Past Surgical History:   Procedure Laterality Date    APPENDECTOMY      CORONARY ARTERY BYPASS GRAFT (CABG)      3 vessel    HERNIA REPAIR      INSERTION, CATHETER, VASCULAR, DUAL LUMEN N/A 7/10/2019    Performed by J[facility]illa [facility]    MULTIPLE TOOTH EXTRACTIONS  2019    3 teeth    TONSILLECTOMY       Family History     Problem Relation (Age of Onset)    No Known Problems Mother, Father        Tobacco Use    Smoking status: Never Smoker    Smokeless tobacco: Never Used   Substance and Sexual Activity    Alcohol use: No     Frequency: Never    Drug use: No    Sexual activity: Never     Review of Systems   Constitutional: Negative for activity change and unexpected weight change.   HENT: Positive for mouth sores, sore throat and trouble swallowing.    Eyes: Negative for pain and visual disturbance.   Respiratory: Negative for shortness of breath and stridor.    Cardiovascular: Negative for chest pain and leg swelling.   Gastrointestinal: Negative for abdominal pain and nausea.   Endocrine: Negative for cold intolerance and heat intolerance.   Musculoskeletal: Negative for gait problem and joint swelling.   Skin: Negative for color change and wound.   Allergic/Immunologic: Negative for immunocompromised state.   Neurological: Negative for seizures and weakness.   Hematological: Negative for adenopathy. Does not bruise/bleed easily.   Psychiatric/Behavioral: Negative for agitation and confusion.     Objective:     Vital Signs (Most Recent):  Temp: 98 °F (36.7 °C) (07/24/19 1219)  Pulse: 90 (07/24/19 1219)  Resp: 14 (07/24/19 1219)  BP: 105/64 (07/24/19 1219)  SpO2: 95 % (07/24/19 1115) Vital Signs (24h Range):  Temp:  [97.3 °F (36.3 °C)-98.2 °F (36.8 °C)] 98 °F (36.7  °C)  Pulse:  [80-94] 90  Resp:  [14-20] 14  SpO2:  [94 %-99 %] 95 %  BP: ()/(48-87) 105/64     Weight: 77.5 kg (170 lb 13.7 oz)  Body mass index is 25.23 kg/m².    Date 07/24/19 0700 - 07/25/19 0659   Shift 0094-7157 1716-1366 9799-0406 24 Hour Total   INTAKE   I.V.(mL/kg) 50(0.6)   50(0.6)   IV Piggyback 100   100   Shift Total(mL/kg) 150(1.9)   150(1.9)   OUTPUT   Shift Total(mL/kg)       Weight (kg) 77.5 77.5 77.5 77.5       Physical Exam  Physical Exam     Vitals:    07/24/19 1219   BP: 105/64   Pulse: 90   Resp: 14   Temp: 98 °F (36.7 °C)       Constitutional: Well appearing / communicating.  NAD.  Eyes: EOM I Bilaterally  Head/Face: Normocephalic.  Negative paranasal sinus pressure/tenderness.  Salivary glands WNL.  House Brackmann I Bilaterally.    Right Ear: External Auditory Canal WNL,TM w/o masses/lesions/perforations.  Auricle WNL.  Left Ear: External Auditory Canal WNL,TM w/o masses/lesions/perforations. Auricle WNL.  Nose: No gross nasal septal deviation. Inferior Turbinates 3+ bilaterally. No septal perforation. No masses/lesions. External nasal skin without masses/lesions.  Oral Cavity: Gingiva/lips WNL.  FOM Soft, no masses palpated. Oral Tongue mobile with white plaques present that scrape off; 3 rhomboid atrophic, erythematous patches on the dorsal anterior  surface of the tongue . Hard Palate WNL.   Oropharynx: BOT WNL. No masses/lesions noted. Tonsillar fossa/pharyngeal wall without lesions. Posterior oropharynx WNL.  Soft palate without masses. Midline uvula.   Neck/Lymphatic: No LAD I-VI bilaterally.  No thyromegaly.  No masses noted on exam.    Mirror laryngoscopy/nasopharyngoscopy: Active gag reflex.  Unable to perform.    Neuro/Psychiatric: AOx3.  Normal mood and affect.   Cardiovascular: Normal carotid pulses bilaterally, no increasing jugular venous distention noted at cervical region bilaterally.    Respiratory: Normal respiratory effort, no stridor, no retractions noted.    See  separate procedure not for FFL.    Significant Labs:  CBC:   Recent Labs   Lab 07/24/19 0625   WBC 14.40*   RBC 3.25*   HGB 9.5*   HCT 29.0*      MCV 89   MCH 29.2   MCHC 32.8     CMP:   Recent Labs   Lab 07/24/19 0625   *   CALCIUM 8.3*   ALBUMIN 2.9*   PROT 5.2*   *   K 3.0*   CO2 25   CL 92*   BUN 32*   CREATININE 1.5*   ALKPHOS 64   ALT 37   AST 48*   BILITOT 1.0     Coagulation:   Recent Labs   Lab 07/24/19 0625   LABPROT 12.9*   INR 1.2   APTT 35.1*         Assessment/Plan:     Glossitis  See plan for Oropharyngeal candidiasis    Oropharyngeal candidiasis  FFL is consistent with findings of thrush.  Consider changing Clotrimazole to Nystatin 100,00U/ml 6ml PO swish and swallow QID and PO/IV diflucan due to factors of severity of thrush, patient's immunocompromised state secondary to chronic steroid usage, and currently on broad spectrum antibiotics.  Continue current topical analgesics. Recommend discontinuing triamcinolone paste.       VTE Risk Mitigation (From admission, onward)        Ordered     IP VTE HIGH RISK PATIENT  Once      07/19/19 1452     Place JANE hose  Until discontinued      07/19/19 0910     Place sequential compression device  Until discontinued      07/19/19 0910          Thank you for your consult.    Maddie Borja MD  Otorhinolaryngology-Head & Neck Surgery  [facility]

## 2019-07-24 NOTE — PT/OT/SLP PROGRESS
Occupational Therapy   Treatment    Name: Jose Martin Hutchins  MRN: [id number]  Admitting Diagnosis:  Intraperitoneal hemorrhage  2 Days Post-Op    Recommendations:     Discharge Recommendations: rehabilitation facility  Discharge Equipment Recommendations:  wheelchair, manual, hospital bed, lift device  Barriers to discharge:  None    Assessment:   Patient orthostatic upon sitting EOB, but did demonstrate improved bed mobility this date. Will benefit greatly from IPR to address functional deficits.     Jose Martin Hutchins is a 78 y.o. male with a medical diagnosis of Intraperitoneal hemorrhage. Performance deficits affecting function are weakness, impaired endurance, impaired self care skills, impaired functional mobilty, gait instability, impaired balance, decreased lower extremity function, decreased upper extremity function, impaired cognition, decreased coordination, decreased safety awareness, decreased ROM, impaired skin, impaired coordination, pain, impaired cardiopulmonary response to activity.     Rehab Prognosis:  Good and Fair; patient would benefit from acute skilled OT services to address these deficits and reach maximum level of function.       Plan:     Patient to be seen 5 x/week to address the above listed problems via self-care/home management, therapeutic activities, therapeutic exercises  · Plan of Care Expires:    · Plan of Care Reviewed with: patient, spouse    Subjective     Pain/Comfort:  · Pain Rating 1: 0/10  · Pain Rating Post-Intervention 1: 0/10    Objective:     Communicated with: Christine iverson prior to session.  Patient found HOB elevated with bed alarm, telemetry, peripheral IV, SCD upon OT entry to room.    General Precautions: Standard, fall   Orthopedic Precautions:N/A   Braces: N/A     Bed Mobility:    · Patient completed Rolling/Turning to Right with contact guard assistance, with side rail and VCs and increased time  · Patient completed Scooting/Bridging with contact guard  assistance  · Patient completed Supine to Sit with minimum assistance, moderate assistance and with side rail  · Patient completed Sit to Supine with total assistance and 2 persons     Functional Mobility/Transfers:  · N/A    Activities of Daily Living:  · Grooming: stand by assistance to use oral swab    [facility] 6 Click ADL: 12    Treatment & Education:  Patient with improved bed mobility this date. Transitioned to sitting EOB and able to scoot self out and place feet on ground /c CGA. Patient with orthostatic hypotension symptoms and had to be returned supine totalA of 2. Patient placed in trendelenburg and symptoms improved. Patient's HOB set-up to ~90* and able to complete oral care and BLE therex (see PT note). Patient sat EOB a second time /c PT.    BP Supine: 127/70, HR 89  Sittin/48,   Trendelenbur/87, HR 87  HOB Elevated: 118/74, 94    Patient left HOB elevated with all lines intact, call button in reach, nurse notified and PT and wife presentEducation:      GOALS:   Multidisciplinary Problems     Occupational Therapy Goals        Problem: Occupational Therapy Goal    Goal Priority Disciplines Outcome Interventions   Occupational Therapy Goal     OT, PT/OT Ongoing (interventions implemented as appropriate)    Description:  Goals to be met by: 2019    Patient will increase functional independence with ADLs by performing:    Feeding with Set-up Assistance.  UE Dressing with Minimal Assistance.  LE Dressing with Moderate Assistance.  Grooming while seated with Set-up Assistance.  Toileting from bedside commode with Moderate Assistance for hygiene and clothing management.   Sitting at edge of bed x20 minutes with Supervision.  Rolling to Bilateral with Modified [place].   Supine to sit with Modified [place].  Step transfer with Minimal Assistance  Toilet transfer to bedside commode with Minimal Assistance.  Increased functional strength to WFL for ADLS.  Upper extremity exercise  program 10 reps per handout, with supervision.                      Time Tracking:     OT Date of Treatment: 07/24/19  OT Start Time: 1020  OT Stop Time: 1100  OT Total Time (min): 40 mins co-tx with PT    Billable Minutes:Self Care/Home Management 10  Therapeutic Activity 15    GOOD Steve  7/24/2019

## 2019-07-24 NOTE — ASSESSMENT & PLAN NOTE
Patient presented to hospital with Esparza in place for urinary retention  Due to low urinary output, Lasix was started   Discontinue Lasix  Continue to monitor urine output

## 2019-07-24 NOTE — PROGRESS NOTES
[facility] Medicine  Progress Note    Patient Name: Jose Martin Hutchins  MRN: [id number]  Patient Class: IP- Inpatient   Admission Date: 7/19/2019  Length of Stay: 5 days  Attending Physician: Al Boone III, MD  Primary Care Provider: Sam Washington MD        Subjective:     Principal Problem:Intraperitoneal hemorrhage      HPI:  79 yo male with pmhx of HTN, CAD, HLD, CKD, GERD, RA, CIDP, Guillain Burre syndrome and A.fib was transferred from [facility] for IR to drain his intraperitoneal hemorrhage. He was admitted in [facility] 8 days ago. He was initially admitted for elective plasmapheresis since his CIDP was worsening. During his stay he became septic and was found to have UTI. He was treated for UTI. While he was in the general floor, he developed fever, hypotensive and became hemodynamically unstable with a drop in his h/h. He had a CT abdomen done which was positive for intraperitoneal hemorrhage that needed to be assessed by IR for possible draining and embolizing the source.   During his transfer to [facility], EMS noticed that the pt had few episodes where his eyes rolled back and his body was shaking. He was also noticed to have similar episode when he was first seen in the ICU. During those episodes his BP was noticeably low as well. Pt was awake upon verbal command. He knows his name but is not oriented to time. He denies any chest pain, sob, nausea, vomiting. He does complaint of pain in his right side of the abdomen.  He has elevated WBC, but no growth from cultures in the outside facility.            Overview/Hospital Course:  No notes on file    Interval History: Patient continues to complain about mouth dryness. Denies any chest pain, sob, nausea or vomiting. Wife at bedside reports that patient is not eating a lot. Speech therapy is continuing to work with patient. Patient reports loose stools.     Review of Systems   Constitutional: Negative for activity change, appetite  change, chills, fatigue and fever.   Respiratory: Negative for cough, choking, shortness of breath and wheezing.    Cardiovascular: Negative for chest pain and leg swelling.   Gastrointestinal: Negative for abdominal pain, blood in stool, constipation, diarrhea and nausea.   Skin: Negative for rash and wound.   Neurological: Positive for weakness. Negative for dizziness, light-headedness and headaches.     Objective:     Vital Signs (Most Recent):  Temp: 98 °F (36.7 °C) (07/24/19 0738)  Pulse: 80 (07/24/19 0739)  Resp: 18 (07/24/19 0738)  BP: 121/79 (07/24/19 0738)  SpO2: 95 % (07/24/19 0739) Vital Signs (24h Range):  Temp:  [96.7 °F (35.9 °C)-98.2 °F (36.8 °C)] 98 °F (36.7 °C)  Pulse:  [] 80  Resp:  [14-22] 18  SpO2:  [94 %-100 %] 95 %  BP: (108-147)/(51-89) 121/79     Weight: 77.5 kg (170 lb 13.7 oz)  Body mass index is 25.23 kg/m².    Intake/Output Summary (Last 24 hours) at 7/24/2019 0915  Last data filed at 7/24/2019 0524  Gross per 24 hour   Intake 525 ml   Output 2000 ml   Net -1475 ml      Physical Exam   Constitutional: He is oriented to person, place, and time. He appears well-developed.   HENT:   Head: Normocephalic and atraumatic.   Mouth/Throat: No oropharyngeal exudate.   Eyes: EOM are normal. Right eye exhibits no discharge. Left eye exhibits no discharge. No scleral icterus.   Neck: Normal range of motion. No thyromegaly present.   Cardiovascular: Normal rate, regular rhythm, normal heart sounds and intact distal pulses. Exam reveals no gallop and no friction rub.   No murmur heard.  Trialysis Cath in the left subclavian    Pulmonary/Chest: Effort normal and breath sounds normal. No stridor. No respiratory distress. He has no wheezes. He has no rales. He exhibits no tenderness.   Abdominal: Soft. Bowel sounds are normal. He exhibits no distension. There is no tenderness. There is no guarding.   Genitourinary:   Genitourinary Comments: Esparza in place   Musculoskeletal: He exhibits no edema or  deformity.   Neurological: He is alert and oriented to person, place, and time.   Alert and oriented to self, place, and time.   Strength: 3/5 in bilateral upper extremities; 2/5 in the bilateral lower extremities   Skin: Skin is warm. No rash noted. No erythema.   Nursing note and vitals reviewed.      Significant Labs:   Blood Culture: No results for input(s): LABBLOO in the last 48 hours.  CBC:   Recent Labs   Lab 07/23/19  0530 07/24/19  0625   WBC 11.49 14.40*   HGB 9.1* 9.5*   HCT 27.5* 29.0*    184     CMP:   Recent Labs   Lab 07/23/19  0530 07/24/19  0625   * 127*   K 3.4* 3.0*   CL 92* 92*   CO2 29 25   * 222*   BUN 44* 32*   CREATININE 1.9* 1.5*   CALCIUM 8.5* 8.3*   PROT 5.3* 5.2*   ALBUMIN 3.0* 2.9*   BILITOT 1.0 1.0   ALKPHOS 70 64   AST 56* 48*   ALT 39 37   ANIONGAP 9 10   EGFRNONAA 33* 44*     Magnesium:   Recent Labs   Lab 07/23/19  0530 07/24/19  0625   MG 1.6 1.5*     Respiratory Culture:   Recent Labs   Lab 07/23/19  1005   GSRESP <10 epithelial cells per low power field.  Rare WBC's  No organisms seen       Significant Imaging:           Assessment/Plan:      * Intraperitoneal hemorrhage  Pt transferred from [facility] with intraperitoneal hemorrhage requesting IR consult for embolization  CT abdomen: Acute approximately 15 x 4 cm hematoma within the region of the lesser sac with signs of active contrast extravasation.  IR consulted, currently no need for embolization at this time  Surgery consulted, signed off  Will continue to monitor H/H  H/H stable      Anemia of chronic disease  Patient required 2u RBC's  H/H stable  Will continue to follow H/H    ANDIE (acute kidney injury)  BUN/Cr upon admission: 54/2.8. Baseline 35/1.2.   Likely 2/2 medications vs intravascular volume depletion.   Will start IVF and continue to monitor.   Cr continuing to downtrend  Avoid nephrotoxic agents.       Type 2 diabetes mellitus, without long-term current use of insulin  Continue  Insulin determir 12 units QHS along with moderate dose correcting scale.   POCT glucose QID  AM glucose goal < 180  Will continue to monitor.       Sepsis  Unclear etiology, concern for Pneumonia  WBC on admission 28 and patient was on pressors for hypotension  Blood cultures from outside hospital: NGTD  ID consulted: Continue Cefepime, Flagyl, Vancomycin, Doxycycline  Consulted Pulm for bronchoscopy for fungal cultures  AFB stain: Negative, Fungal cultures are still in process  7/19 Blood cultures: NGTD  WBC downtrending       Seizure-like activity  Seizure like activity noticed, likely 2/2 hypotensive episodes.  EEG: No signs of seizure activity  2 mg Ativan IV for generalized seizures lasting > 5 min  Delirium and seizure precautions.       Urinary retention  Patient presented to hospital with Esparza in place for urinary retention  Due to low urinary output, Lasix was started   Discontinue Lasix  Continue to monitor urine output        Pressure injury of right buttock, stage 3  Wound care consulted.       Paroxysmal atrial fibrillation  Patient with history of A.fib in the outside facility  Currently NSR  HR goal < 120  Will continue to monitor.     Essential hypertension  Hold home BP medications  Levophed discontinued 7/20  BP goal < 140/80        CIDP (chronic inflammatory demyelinating polyneuropathy)  Consult Neuro and appreciate rec; Neurology signed off  Continue Prednisone 15mg daily  Patient to follow up with Neuro outpatient after discharge        VTE Risk Mitigation (From admission, onward)        Ordered     IP VTE HIGH RISK PATIENT  Once      07/19/19 1452     Place JANE hose  Until discontinued      07/19/19 0910     Place sequential compression device  Until discontinued      07/19/19 0910          Mary Lou Gee, PGY-2  [facility]  07/24/2019 9:22 AM

## 2019-07-24 NOTE — ASSESSMENT & PLAN NOTE
Malnutrition in the context of Acute Illness/Injury    Related to (etiology):  dx    Signs and Symptoms (as evidenced by):  Energy Intake: <50% of estimated energy requirement for few months  Body Fat Depletion: mild depletion of orbitals, triceps and thoracic and lumbar region   Muscle Mass Depletion: mild depletion of lower extremities   Weight Loss: 18% x 3 months     Interventions:  Commercial beverage    Nutrition Diagnosis Status:  New

## 2019-07-24 NOTE — SUBJECTIVE & OBJECTIVE
Medications:  Continuous Infusions:  Scheduled Meds:   ceFEPime (MAXIPIME) IVPB  2 g Intravenous Q12H    clotrimazole  10 mg Oral 5x Daily    docusate sodium  100 mg Oral BID    doxycycline (VIBRAMYCIN) IVPB  100 mg Intravenous Q12H    DULoxetine  30 mg Oral Daily    famotidine (PF)  20 mg Intravenous Daily    folic acid  1 mg Oral Daily    furosemide  20 mg Intravenous Daily    insulin detemir U-100  12 Units Subcutaneous QHS    Lactobacillus acidoph-L.bulgar  1 tablet Oral TID WM    lidocaine (PF)  10 mL Other Once    lidocaine (PF)  4 mL Nebulization Once    lidocaine HCL 2%  2 mL Topical (Top) Once    metronidazole  500 mg Intravenous Q8H    polyethylene glycol  17 g Oral BID    potassium chloride in water  20 mEq Intravenous Once    predniSONE  15 mg Oral Daily    sodium phosphate IVPB  20.01 mmol Intravenous Once    tamsulosin  0.4 mg Oral Daily    triamcinolone acetonide 0.1%   Mouth/Throat BID    valACYclovir  1,000 mg Oral BID    vancomycin (VANCOCIN) IVPB  750 mg Intravenous Q24H     PRN Meds:sodium chloride, sodium chloride, acetaminophen, benzocaine, dextrose 50 % in water (D50W), dextrose 50 % in water (D50W), fentaNYL, glucagon (human recombinant), glucose, glucose, glucose, glucose, HYDROcodone-acetaminophen, insulin aspart U-100, lidocaine HCL 2%, magic mouthwash (diphenhydrAMINE 12.5 mg/5 mL 20 mL, aluminum & magnesium hydroxide-simethicone (MYLANTA) 20 mL, lidocaine HCl 2% (XYLOCAINE) 20 mL) solution, midazolam, morphine, naloxone, nitroGLYCERIN, ramelteon, sodium chloride 0.9%, sodium chloride 0.9%, sodium chloride 0.9%     No current facility-administered medications on file prior to encounter.      Current Outpatient Medications on File Prior to Encounter   Medication Sig    aspirin (ECOTRIN) 81 MG EC tablet Take 81 mg by mouth once daily.    dextrose 5 % SolP 250 mL with norepinephrine 1 mg/mL Soln 4 mg Inject 1.698 mcg/min into the vein continuous.    dicyclomine  (BENTYL) 10 MG capsule Take 10 mg by mouth 3 (three) times daily as needed.    diphenoxylate-atropine 2.5-0.025 mg (LOMOTIL) 2.5-0.025 mg per tablet Take 1 tablet by mouth 4 (four) times daily as needed for Diarrhea.    DULoxetine (CYMBALTA) 30 MG capsule Take 30 mg by mouth once daily.    fluconazole (DIFLUCAN) 100 MG tablet Take 1 tablet (100 mg total) by mouth once daily. for 6 days    folic acid (FOLVITE) 1 MG tablet Take 1 mg by mouth once daily.    gabapentin (NEURONTIN) 600 MG tablet Take 600 mg by mouth 3 (three) times daily.    hydroCHLOROthiazide (HYDRODIURIL) 25 MG tablet Take 1 tablet (25 mg total) by mouth once daily. for 6 days    Lactobacillus rhamnosus GG (CULTURELLE) 10 billion cell capsule Take 1 capsule by mouth once daily.    metoprolol succinate (TOPROL-XL) 25 MG 24 hr tablet Take 1 tablet (25 mg total) by mouth once daily.    nitroGLYCERIN (NITROSTAT) 0.4 MG SL tablet Place 0.4 mg under the tongue every 5 (five) minutes as needed for Chest pain.    omega-3 acid ethyl esters (LOVAZA) 1 gram capsule Take 2 g by mouth 2 (two) times daily.    oxybutynin (DITROPAN) 5 MG Tab Take 5 mg by mouth 2 (two) times daily.    pantoprazole (PROTONIX) 40 MG tablet Take 1 tablet (40 mg total) by mouth once daily.    piperacillin sodium/tazobactam (PIPERACILLIN-TAZOBACTAM 3.375G/50ML D5W, READY TO MIX,) Inject 50 mLs (3.375 g total) into the vein every 8 (eight) hours.    tamsulosin (FLOMAX) 0.4 mg Cap Take 0.4 mg by mouth once daily.    vitamin D (VITAMIN D3) 1000 units Tab Take 1,000 Units by mouth once daily.       Review of patient's allergies indicates:   Allergen Reactions    Sulfa (sulfonamide antibiotics) Hives    Ramucirumab Palpitations       Past Medical History:   Diagnosis Date    A-fib     Coronary artery disease     GERD (gastroesophageal reflux disease)     Hypertension     Kidney function abnormal     Rheumatoid arthritis      Past Surgical History:   Procedure Laterality  Date    APPENDECTOMY      CORONARY ARTERY BYPASS GRAFT (CABG)      3 vessel    HERNIA REPAIR      INSERTION, CATHETER, VASCULAR, DUAL LUMEN N/A 7/10/2019    Performed by Jovan Mont[facility]    MULTIPLE TOOTH EXTRACTIONS  2019    3 teeth    TONSILLECTOMY       Family History     Problem Relation (Age of Onset)    No Known Problems Mother, Father        Tobacco Use    Smoking status: Never Smoker    Smokeless tobacco: Never Used   Substance and Sexual Activity    Alcohol use: No     Frequency: Never    Drug use: No    Sexual activity: Never     Review of Systems   Constitutional: Negative for activity change and unexpected weight change.   HENT: Positive for mouth sores, sore throat and trouble swallowing.    Eyes: Negative for pain and visual disturbance.   Respiratory: Negative for shortness of breath and stridor.    Cardiovascular: Negative for chest pain and leg swelling.   Gastrointestinal: Negative for abdominal pain and nausea.   Endocrine: Negative for cold intolerance and heat intolerance.   Musculoskeletal: Negative for gait problem and joint swelling.   Skin: Negative for color change and wound.   Allergic/Immunologic: Negative for immunocompromised state.   Neurological: Negative for seizures and weakness.   Hematological: Negative for adenopathy. Does not bruise/bleed easily.   Psychiatric/Behavioral: Negative for agitation and confusion.     Objective:     Vital Signs (Most Recent):  Temp: 98 °F (36.7 °C) (07/24/19 1219)  Pulse: 90 (07/24/19 1219)  Resp: 14 (07/24/19 1219)  BP: 105/64 (07/24/19 1219)  SpO2: 95 % (07/24/19 1115) Vital Signs (24h Range):  Temp:  [97.3 °F (36.3 °C)-98.2 °F (36.8 °C)] 98 °F (36.7 °C)  Pulse:  [80-94] 90  Resp:  [14-20] 14  SpO2:  [94 %-99 %] 95 %  BP: ()/(48-87) 105/64     Weight: 77.5 kg (170 lb 13.7 oz)  Body mass index is 25.23 kg/m².    Date 07/24/19 0700 - 07/25/19 0659   Shift 6240-6663 2593-4330 6584-7081 24 Hour Total   INTAKE    I.V.(mL/kg) 50(0.6)   50(0.6)   IV Piggyback 100   100   Shift Total(mL/kg) 150(1.9)   150(1.9)   OUTPUT   Shift Total(mL/kg)       Weight (kg) 77.5 77.5 77.5 77.5       Physical Exam  Physical Exam     Vitals:    07/24/19 1219   BP: 105/64   Pulse: 90   Resp: 14   Temp: 98 °F (36.7 °C)       Constitutional: Well appearing / communicating.  NAD.  Eyes: EOM I Bilaterally  Head/Face: Normocephalic.  Negative paranasal sinus pressure/tenderness.  Salivary glands WNL.  House Brackmann I Bilaterally.    Right Ear: External Auditory Canal WNL,TM w/o masses/lesions/perforations.  Auricle WNL.  Left Ear: External Auditory Canal WNL,TM w/o masses/lesions/perforations. Auricle WNL.  Nose: No gross nasal septal deviation. Inferior Turbinates 3+ bilaterally. No septal perforation. No masses/lesions. External nasal skin without masses/lesions.  Oral Cavity: Gingiva/lips WNL.  FOM Soft, no masses palpated. Oral Tongue mobile with white plaques present that scrape off; 3 rhomboid atrophic, erythematous patches on the dorsal anterior  surface of the tongue . Hard Palate WNL.   Oropharynx: BOT WNL. No masses/lesions noted. Tonsillar fossa/pharyngeal wall without lesions. Posterior oropharynx WNL.  Soft palate without masses. Midline uvula.   Neck/Lymphatic: No LAD I-VI bilaterally.  No thyromegaly.  No masses noted on exam.    Mirror laryngoscopy/nasopharyngoscopy: Active gag reflex.  Unable to perform.    Neuro/Psychiatric: AOx3.  Normal mood and affect.   Cardiovascular: Normal carotid pulses bilaterally, no increasing jugular venous distention noted at cervical region bilaterally.    Respiratory: Normal respiratory effort, no stridor, no retractions noted.    See separate procedure not for FFL.    Significant Labs:  CBC:   Recent Labs   Lab 07/24/19  0625   WBC 14.40*   RBC 3.25*   HGB 9.5*   HCT 29.0*      MCV 89   MCH 29.2   MCHC 32.8     CMP:   Recent Labs   Lab 07/24/19  0625   *   CALCIUM 8.3*   ALBUMIN 2.9*    PROT 5.2*   *   K 3.0*   CO2 25   CL 92*   BUN 32*   CREATININE 1.5*   ALKPHOS 64   ALT 37   AST 48*   BILITOT 1.0     Coagulation:   Recent Labs   Lab 07/24/19  0625   LABPROT 12.9*   INR 1.2   APTT 35.1*

## 2019-07-24 NOTE — PROGRESS NOTES
TN called and left message for Olga from [facility] [phone] --  TN spoke with Olga -- if pt is medically cleared - pt can return tomorrow --  Thursday      there are no beds available until then.       per [facility]:    · Note: If he is not medically ready until Friday we can accept with a cut off time of 11:00 a.m.  [email or web address] [email or web address]  · 7/24/2019 9:38:03 AM Accepted: Patient has been approved for inpatient rehab on Thursday, 7/25. Please schedule transfer for morning admit (cut off is at 1:00). Please attached DC summary and tomorrows MAR. Number for report is [phone]. Please let me know if we can arrange for tomorrow! Thanks!! Niharika Albert [phone]    TN left message with Ronaldo Verduzco (daughter) [phone] - advising her of d/c tomorrow and info re:  neurology apy

## 2019-07-24 NOTE — SUBJECTIVE & OBJECTIVE
Interval History: Patient continues to complain about mouth dryness. Denies any chest pain, sob, nausea or vomiting. Wife at bedside reports that patient is not eating a lot. Speech therapy is continuing to work with patient. Patient reports loose stools.     Review of Systems   Constitutional: Negative for activity change, appetite change, chills, fatigue and fever.   Respiratory: Negative for cough, choking, shortness of breath and wheezing.    Cardiovascular: Negative for chest pain and leg swelling.   Gastrointestinal: Negative for abdominal pain, blood in stool, constipation, diarrhea and nausea.   Skin: Negative for rash and wound.   Neurological: Positive for weakness. Negative for dizziness, light-headedness and headaches.     Objective:     Vital Signs (Most Recent):  Temp: 98 °F (36.7 °C) (07/24/19 0738)  Pulse: 80 (07/24/19 0739)  Resp: 18 (07/24/19 0738)  BP: 121/79 (07/24/19 0738)  SpO2: 95 % (07/24/19 0739) Vital Signs (24h Range):  Temp:  [96.7 °F (35.9 °C)-98.2 °F (36.8 °C)] 98 °F (36.7 °C)  Pulse:  [] 80  Resp:  [14-22] 18  SpO2:  [94 %-100 %] 95 %  BP: (108-147)/(51-89) 121/79     Weight: 77.5 kg (170 lb 13.7 oz)  Body mass index is 25.23 kg/m².    Intake/Output Summary (Last 24 hours) at 7/24/2019 0915  Last data filed at 7/24/2019 0524  Gross per 24 hour   Intake 525 ml   Output 2000 ml   Net -1475 ml      Physical Exam   Constitutional: He is oriented to person, place, and time. He appears well-developed.   HENT:   Head: Normocephalic and atraumatic.   Mouth/Throat: No oropharyngeal exudate.   Eyes: EOM are normal. Right eye exhibits no discharge. Left eye exhibits no discharge. No scleral icterus.   Neck: Normal range of motion. No thyromegaly present.   Cardiovascular: Normal rate, regular rhythm, normal heart sounds and intact distal pulses. Exam reveals no gallop and no friction rub.   No murmur heard.  Trialysis Cath in the left subclavian    Pulmonary/Chest: Effort normal and breath  sounds normal. No stridor. No respiratory distress. He has no wheezes. He has no rales. He exhibits no tenderness.   Abdominal: Soft. Bowel sounds are normal. He exhibits no distension. There is no tenderness. There is no guarding.   Genitourinary:   Genitourinary Comments: Esparza in place   Musculoskeletal: He exhibits no edema or deformity.   Neurological: He is alert and oriented to person, place, and time.   Alert and oriented to self, place, and time.   Strength: 3/5 in bilateral upper extremities; 2/5 in the bilateral lower extremities   Skin: Skin is warm. No rash noted. No erythema.   Nursing note and vitals reviewed.      Significant Labs:   Blood Culture: No results for input(s): LABBLOO in the last 48 hours.  CBC:   Recent Labs   Lab 07/23/19  0530 07/24/19  0625   WBC 11.49 14.40*   HGB 9.1* 9.5*   HCT 27.5* 29.0*    184     CMP:   Recent Labs   Lab 07/23/19  0530 07/24/19  0625   * 127*   K 3.4* 3.0*   CL 92* 92*   CO2 29 25   * 222*   BUN 44* 32*   CREATININE 1.9* 1.5*   CALCIUM 8.5* 8.3*   PROT 5.3* 5.2*   ALBUMIN 3.0* 2.9*   BILITOT 1.0 1.0   ALKPHOS 70 64   AST 56* 48*   ALT 39 37   ANIONGAP 9 10   EGFRNONAA 33* 44*     Magnesium:   Recent Labs   Lab 07/23/19  0530 07/24/19  0625   MG 1.6 1.5*     Respiratory Culture:   Recent Labs   Lab 07/23/19  1005   GSRESP <10 epithelial cells per low power field.  Rare WBC's  No organisms seen       Significant Imaging:

## 2019-07-25 LAB
ALBUMIN SERPL BCP-MCNC: 3 G/DL (ref 3.5–5.2)
ALP SERPL-CCNC: 70 U/L (ref 55–135)
ALT SERPL W/O P-5'-P-CCNC: 40 U/L (ref 10–44)
ANION GAP SERPL CALC-SCNC: 10 MMOL/L (ref 8–16)
APTT BLDCRRT: 34.3 SEC (ref 21–32)
AST SERPL-CCNC: 61 U/L (ref 10–40)
BACTERIA #/AREA URNS HPF: ABNORMAL /HPF
BACTERIA SPEC AEROBE CULT: NO GROWTH
BASOPHILS # BLD AUTO: 0.01 K/UL (ref 0–0.2)
BASOPHILS # BLD AUTO: 0.03 K/UL (ref 0–0.2)
BASOPHILS NFR BLD: 0.1 % (ref 0–1.9)
BASOPHILS NFR BLD: 0.2 % (ref 0–1.9)
BILIRUB SERPL-MCNC: 1.3 MG/DL (ref 0.1–1)
BILIRUB UR QL STRIP: NEGATIVE
BUN SERPL-MCNC: 30 MG/DL (ref 8–23)
CALCIUM SERPL-MCNC: 8.9 MG/DL (ref 8.7–10.5)
CHLORIDE SERPL-SCNC: 93 MMOL/L (ref 95–110)
CLARITY UR: CLEAR
CO2 SERPL-SCNC: 28 MMOL/L (ref 23–29)
COLOR UR: YELLOW
CREAT SERPL-MCNC: 1.3 MG/DL (ref 0.5–1.4)
DIFFERENTIAL METHOD: ABNORMAL
DIFFERENTIAL METHOD: ABNORMAL
EOSINOPHIL # BLD AUTO: 0 K/UL (ref 0–0.5)
EOSINOPHIL # BLD AUTO: 0 K/UL (ref 0–0.5)
EOSINOPHIL NFR BLD: 0 % (ref 0–8)
EOSINOPHIL NFR BLD: 0 % (ref 0–8)
ERYTHROCYTE [DISTWIDTH] IN BLOOD BY AUTOMATED COUNT: 17.6 % (ref 11.5–14.5)
ERYTHROCYTE [DISTWIDTH] IN BLOOD BY AUTOMATED COUNT: 17.8 % (ref 11.5–14.5)
EST. GFR  (AFRICAN AMERICAN): >60 ML/MIN/1.73 M^2
EST. GFR  (NON AFRICAN AMERICAN): 53 ML/MIN/1.73 M^2
GLUCOSE SERPL-MCNC: 146 MG/DL (ref 70–110)
GLUCOSE UR QL STRIP: ABNORMAL
GRAM STN SPEC: NORMAL
GRAN CASTS #/AREA URNS LPF: ABNORMAL /LPF
HCT VFR BLD AUTO: 32.1 % (ref 40–54)
HCT VFR BLD AUTO: 33.9 % (ref 40–54)
HGB BLD-MCNC: 10.6 G/DL (ref 14–18)
HGB BLD-MCNC: 11.2 G/DL (ref 14–18)
HGB UR QL STRIP: ABNORMAL
HYALINE CASTS #/AREA URNS LPF: 0 /LPF
INR PPP: 1.2 (ref 0.8–1.2)
KETONES UR QL STRIP: ABNORMAL
LEUKOCYTE ESTERASE UR QL STRIP: NEGATIVE
LYMPHOCYTES # BLD AUTO: 0.4 K/UL (ref 1–4.8)
LYMPHOCYTES # BLD AUTO: 0.9 K/UL (ref 1–4.8)
LYMPHOCYTES NFR BLD: 2 % (ref 18–48)
LYMPHOCYTES NFR BLD: 5.9 % (ref 18–48)
MAGNESIUM SERPL-MCNC: 1.7 MG/DL (ref 1.6–2.6)
MCH RBC QN AUTO: 29.1 PG (ref 27–31)
MCH RBC QN AUTO: 29.4 PG (ref 27–31)
MCHC RBC AUTO-ENTMCNC: 33 G/DL (ref 32–36)
MCHC RBC AUTO-ENTMCNC: 33 G/DL (ref 32–36)
MCV RBC AUTO: 88 FL (ref 82–98)
MCV RBC AUTO: 89 FL (ref 82–98)
MICROSCOPIC COMMENT: ABNORMAL
MONOCYTES # BLD AUTO: 0.3 K/UL (ref 0.3–1)
MONOCYTES # BLD AUTO: 1 K/UL (ref 0.3–1)
MONOCYTES NFR BLD: 1.8 % (ref 4–15)
MONOCYTES NFR BLD: 5.1 % (ref 4–15)
NEUTROPHILS # BLD AUTO: 13.8 K/UL (ref 1.8–7.7)
NEUTROPHILS # BLD AUTO: 17.6 K/UL (ref 1.8–7.7)
NEUTROPHILS NFR BLD: 92.2 % (ref 38–73)
NEUTROPHILS NFR BLD: 92.7 % (ref 38–73)
NITRITE UR QL STRIP: NEGATIVE
PH UR STRIP: 6 [PH] (ref 5–8)
PHOSPHATE SERPL-MCNC: 1.9 MG/DL (ref 2.7–4.5)
PLATELET # BLD AUTO: 187 K/UL (ref 150–350)
PLATELET # BLD AUTO: 225 K/UL (ref 150–350)
PMV BLD AUTO: 8.6 FL (ref 9.2–12.9)
PMV BLD AUTO: 9.3 FL (ref 9.2–12.9)
POCT GLUCOSE: 132 MG/DL (ref 70–110)
POCT GLUCOSE: 158 MG/DL (ref 70–110)
POCT GLUCOSE: 217 MG/DL (ref 70–110)
POCT GLUCOSE: 226 MG/DL (ref 70–110)
POTASSIUM SERPL-SCNC: 3 MMOL/L (ref 3.5–5.1)
PROT SERPL-MCNC: 5.6 G/DL (ref 6–8.4)
PROT UR QL STRIP: ABNORMAL
PROTHROMBIN TIME: 12.3 SEC (ref 9–12.5)
RBC # BLD AUTO: 3.64 M/UL (ref 4.6–6.2)
RBC # BLD AUTO: 3.81 M/UL (ref 4.6–6.2)
RBC #/AREA URNS HPF: 0 /HPF (ref 0–4)
SODIUM SERPL-SCNC: 131 MMOL/L (ref 136–145)
SP GR UR STRIP: 1.02 (ref 1–1.03)
URN SPEC COLLECT METH UR: ABNORMAL
UROBILINOGEN UR STRIP-ACNC: NEGATIVE EU/DL
WBC # BLD AUTO: 15.76 K/UL (ref 3.9–12.7)
WBC # BLD AUTO: 19.84 K/UL (ref 3.9–12.7)
WBC #/AREA URNS HPF: 0 /HPF (ref 0–5)

## 2019-07-25 PROCEDURE — 99232 SBSQ HOSP IP/OBS MODERATE 35: CPT | Mod: ,,, | Performed by: OTOLARYNGOLOGY

## 2019-07-25 PROCEDURE — 63600175 PHARM REV CODE 636 W HCPCS: Performed by: STUDENT IN AN ORGANIZED HEALTH CARE EDUCATION/TRAINING PROGRAM

## 2019-07-25 PROCEDURE — 25000003 PHARM REV CODE 250: Performed by: STUDENT IN AN ORGANIZED HEALTH CARE EDUCATION/TRAINING PROGRAM

## 2019-07-25 PROCEDURE — 63600175 PHARM REV CODE 636 W HCPCS: Performed by: FAMILY MEDICINE

## 2019-07-25 PROCEDURE — 83735 ASSAY OF MAGNESIUM: CPT

## 2019-07-25 PROCEDURE — S0028 INJECTION, FAMOTIDINE, 20 MG: HCPCS | Performed by: STUDENT IN AN ORGANIZED HEALTH CARE EDUCATION/TRAINING PROGRAM

## 2019-07-25 PROCEDURE — 87040 BLOOD CULTURE FOR BACTERIA: CPT | Mod: 59

## 2019-07-25 PROCEDURE — 81000 URINALYSIS NONAUTO W/SCOPE: CPT

## 2019-07-25 PROCEDURE — 85025 COMPLETE CBC W/AUTO DIFF WBC: CPT

## 2019-07-25 PROCEDURE — 85730 THROMBOPLASTIN TIME PARTIAL: CPT

## 2019-07-25 PROCEDURE — 99232 PR SUBSEQUENT HOSPITAL CARE,LEVL II: ICD-10-PCS | Mod: ,,, | Performed by: OTOLARYNGOLOGY

## 2019-07-25 PROCEDURE — 85610 PROTHROMBIN TIME: CPT

## 2019-07-25 PROCEDURE — 36415 COLL VENOUS BLD VENIPUNCTURE: CPT

## 2019-07-25 PROCEDURE — 80053 COMPREHEN METABOLIC PANEL: CPT

## 2019-07-25 PROCEDURE — 11000001 HC ACUTE MED/SURG PRIVATE ROOM

## 2019-07-25 PROCEDURE — 25000003 PHARM REV CODE 250: Performed by: FAMILY MEDICINE

## 2019-07-25 PROCEDURE — S0030 INJECTION, METRONIDAZOLE: HCPCS | Performed by: STUDENT IN AN ORGANIZED HEALTH CARE EDUCATION/TRAINING PROGRAM

## 2019-07-25 PROCEDURE — 94761 N-INVAS EAR/PLS OXIMETRY MLT: CPT

## 2019-07-25 PROCEDURE — 84100 ASSAY OF PHOSPHORUS: CPT

## 2019-07-25 PROCEDURE — 93005 ELECTROCARDIOGRAM TRACING: CPT

## 2019-07-25 PROCEDURE — 92526 ORAL FUNCTION THERAPY: CPT

## 2019-07-25 RX ORDER — POTASSIUM CHLORIDE 14.9 MG/ML
20 INJECTION INTRAVENOUS ONCE
Status: COMPLETED | OUTPATIENT
Start: 2019-07-25 | End: 2019-07-25

## 2019-07-25 RX ADMIN — DOXYCYCLINE 100 MG: 100 INJECTION, POWDER, LYOPHILIZED, FOR SOLUTION INTRAVENOUS at 05:07

## 2019-07-25 RX ADMIN — TRIAMCINOLONE ACETONIDE: 1 PASTE TOPICAL at 09:07

## 2019-07-25 RX ADMIN — LACTOBACILLUS TAB 1 TABLET: TAB at 08:07

## 2019-07-25 RX ADMIN — DOXYCYCLINE 100 MG: 100 INJECTION, POWDER, LYOPHILIZED, FOR SOLUTION INTRAVENOUS at 03:07

## 2019-07-25 RX ADMIN — CEFEPIME 2 G: 2 INJECTION, POWDER, FOR SOLUTION INTRAVENOUS at 11:07

## 2019-07-25 RX ADMIN — INSULIN DETEMIR 12 UNITS: 100 INJECTION, SOLUTION SUBCUTANEOUS at 09:07

## 2019-07-25 RX ADMIN — TAMSULOSIN HYDROCHLORIDE 0.4 MG: 0.4 CAPSULE ORAL at 08:07

## 2019-07-25 RX ADMIN — FOLIC ACID 1 MG: 1 TABLET ORAL at 08:07

## 2019-07-25 RX ADMIN — METRONIDAZOLE 500 MG: 500 INJECTION, SOLUTION INTRAVENOUS at 06:07

## 2019-07-25 RX ADMIN — VALACYCLOVIR HYDROCHLORIDE 1000 MG: 500 TABLET, FILM COATED ORAL at 08:07

## 2019-07-25 RX ADMIN — FUROSEMIDE 20 MG: 10 INJECTION, SOLUTION INTRAMUSCULAR; INTRAVENOUS at 08:07

## 2019-07-25 RX ADMIN — DULOXETINE 30 MG: 30 CAPSULE, DELAYED RELEASE ORAL at 08:07

## 2019-07-25 RX ADMIN — LACTOBACILLUS TAB 1 TABLET: TAB at 05:07

## 2019-07-25 RX ADMIN — PREDNISONE 15 MG: 10 TABLET ORAL at 08:07

## 2019-07-25 RX ADMIN — TRIAMCINOLONE ACETONIDE: 1 PASTE TOPICAL at 08:07

## 2019-07-25 RX ADMIN — CEFEPIME 2 G: 2 INJECTION, POWDER, FOR SOLUTION INTRAVENOUS at 12:07

## 2019-07-25 RX ADMIN — CEFEPIME 2 G: 2 INJECTION, POWDER, FOR SOLUTION INTRAVENOUS at 01:07

## 2019-07-25 RX ADMIN — FAMOTIDINE 20 MG: 10 INJECTION, SOLUTION INTRAVENOUS at 08:07

## 2019-07-25 RX ADMIN — POTASSIUM CHLORIDE 20 MEQ: 200 INJECTION, SOLUTION INTRAVENOUS at 10:07

## 2019-07-25 RX ADMIN — SODIUM CHLORIDE 1000 ML: 0.9 INJECTION, SOLUTION INTRAVENOUS at 09:07

## 2019-07-25 RX ADMIN — ACYCLOVIR SODIUM 370 MG: 500 INJECTION, SOLUTION INTRAVENOUS at 08:07

## 2019-07-25 RX ADMIN — INSULIN ASPART 2 UNITS: 100 INJECTION, SOLUTION INTRAVENOUS; SUBCUTANEOUS at 05:07

## 2019-07-25 RX ADMIN — METRONIDAZOLE 500 MG: 500 INJECTION, SOLUTION INTRAVENOUS at 09:07

## 2019-07-25 RX ADMIN — METRONIDAZOLE 500 MG: 500 INJECTION, SOLUTION INTRAVENOUS at 03:07

## 2019-07-25 NOTE — NURSING
Call placed to Family medicine regarding change in mental status. Family states patient has had this happen before spike in temp. Temp 97.2 ax, B/P 97/61. Blood sugar 132. Skin warm and dry. ID Dr. Jean Baptiste in to see patient. Family practice MD will come to examine patient.

## 2019-07-25 NOTE — PT/OT/SLP PROGRESS
Occupational Therapy  Hold OT    Patient Name:  Jose Martin Hutchins   MRN:  [id number]    Patient not seen today secondary to nsg hold -- patient with increased confusion, chills, unable to state name. MD team to assess patient. Will follow-up later, when more appropriate.    GOOD Steve  7/25/2019

## 2019-07-25 NOTE — PROGRESS NOTES
[facility] Infectious Disease Progress Note     Primary Team: Family Medicine  Consultant Attending: Alessia  Date of Admit: 7/19/2019    Summary of history     Jose Martin Hutchins is a 78 y.o. male with a relevant history of dyslipidemia, CAD, HTN, mitral regurgitation, tricuspid regurgitation, chronic renal disease, GERD, rheumatoid arthritis, and chronic inflammatory demyelinating polyneuritis.     The patient presented to [facility] on 7/19/2019 as a direct admit to the ICU from [facility] for high level of care and interventional radiology services due to suspected retroperitoneal hematoma.     The patient was initially admitted at [facility] for elective plasmapheresis planned by an outside neurologist for his worsening CIPD. During admission, he had a fever of 102 at which time urine cultures, blood cultures, and urinalysis were done. Cultures remained negative but urinalysis was suspect for UTI which was treated with vancomycin and ceftriaxone for 7 days. Per family, they report he was improving after the two initial plasma exchange treatments but acutely decompensated and became hypotensive yesterday afternoon. At this time, hemoglobin dropped from 11 to 9 and CT chest and abdomen showed upper lobe pneumonia and large hematoma in the lesser sac.     Upon arrival to the [facility] ICU, he became unresponsive while being transferred to the bed with systolic BP in the 70's. He has remained afebrile.    Interval events     Stepped down from ICU to floor 7/22.  Bronchoscopy on 7/23.    Subjective     Patient disoriented and restless with nurses and family at bedside upon entering the room. He reports being in pain but is not able to localize the area of pain. He also reports feeling cold and is shivering. Heart rate elevated at 105 and O2 sat = 91 and then elevated to 95 when nasal cannula placed. Patient remains afebrile (97.4). Primary team notified of patients current condition.    Current  "Medications:     Scheduled:   ceFEPime (MAXIPIME) IVPB  2 g Intravenous Q12H    clotrimazole  10 mg Oral 5x Daily    docusate sodium  100 mg Oral BID    doxycycline (VIBRAMYCIN) IVPB  100 mg Intravenous Q12H    DULoxetine  30 mg Oral Daily    famotidine (PF)  20 mg Intravenous Daily    folic acid  1 mg Oral Daily    furosemide  20 mg Intravenous Daily    insulin detemir U-100  12 Units Subcutaneous QHS    Lactobacillus acidoph-L.bulgar  1 tablet Oral TID WM    lidocaine (PF)  10 mL Other Once    lidocaine (PF)  4 mL Nebulization Once    lidocaine HCL 2%  2 mL Topical (Top) Once    metronidazole  500 mg Intravenous Q8H    polyethylene glycol  17 g Oral BID    predniSONE  15 mg Oral Daily    tamsulosin  0.4 mg Oral Daily    triamcinolone acetonide 0.1%   Mouth/Throat BID    valACYclovir  1,000 mg Oral BID        PRN:  sodium chloride, sodium chloride, acetaminophen, benzocaine, dextrose 50 % in water (D50W), dextrose 50 % in water (D50W), fentaNYL, glucagon (human recombinant), glucose, glucose, glucose, glucose, HYDROcodone-acetaminophen, insulin aspart U-100, lidocaine HCL 2%, magic mouthwash (diphenhydrAMINE 12.5 mg/5 mL 20 mL, aluminum & magnesium hydroxide-simethicone (MYLANTA) 20 mL, lidocaine HCl 2% (XYLOCAINE) 20 mL) solution, midazolam, morphine, naloxone, nitroGLYCERIN, ramelteon, sodium chloride 0.9%, sodium chloride 0.9%, sodium chloride 0.9%    Antibiotics and Day Number of Therapy:  Vancomycin:  -   Cefepime:  - current  Metronidazole:  - current  Doxycycline:  -   Valacyclovir:  - current    Objective   Last 24 Hour Vital Signs:  BP  Min: 66/48  Max: 151/87  Temp  Av.3 °F (36.3 °C)  Min: 96.2 °F (35.7 °C)  Max: 98.2 °F (36.8 °C)  Pulse  Av.7  Min: 87  Max: 104  Resp  Av.8  Min: 14  Max: 18  SpO2  Av.4 %  Min: 93 %  Max: 96 %  Height  Av' 9" (175.3 cm)  Min: 5' 9" (175.3 cm)  Max: 5' 9" (175.3 cm)  Weight  Av kg (167 lb 8.8 oz)  " Min: 74.5 kg (164 lb 3.9 oz)  Max: 77.5 kg (170 lb 13.7 oz)    Lines, drains, airway:  Trialysis catheter - 7/10  Esparza Catheter - 7/11    Physical Examination:  Examination  General: Patient disoriented and restless in moderate distress.  HEENT: normocephalic, atraumatic, EOMI  Neck: No thyromegaly or masses   Cardiac: RRR, no murmurs appreciated, no extra heart sounds  Pulmonary/Chest: CTAB, symmetric chest rise, wheezing heard in BL upper lobes. Patient placed on oxygen and raised O2 saturation from 91 to 95 at bedside.  GI: Soft, moderate tenderness to palpation of epigastric region, mild distention noted, normoactive bowel sounds  Extremities: no edema, clubbing, or cyanosis  Skin: dry, warm, intact. No bruising or rashes.  Neuro: Alert, disoriented to person and place.    Lab data:  CBC:   Lab Results   Component Value Date    WBC 15.76 (H) 07/25/2019    HGB 10.6 (L) 07/25/2019     07/25/2019    MCV 88 07/25/2019    RDW 17.6 (H) 07/25/2019       Estimated Creatinine Clearance: 47.6 mL/min (based on SCr of 1.3 mg/dL).    Microbiology Data  Tongue lesions swab - positive for HSV1  Blood culture (7/11) - negative  Blood culture (7/19) - negative  Respiratory culture - pending  Respiratory gram stain (7/23): rare WBC's, no organisms  Respiratory acid fast: negative  C Diff - negative    Radiology:  CT Abdomen/Pelvis (7/18)  The liver, spleen, gallbladder appear normal.  There is a small amount of perihepatic fluid.  The pancreas and adrenal glands are unremarkable.  There is no hydronephrosis.  Multiple cysts are present in the left kidney.  There is an approximately 15 x 4 cm hematoma within the lesser sac.  Within this hematoma there are areas of increased attenuation suggesting active extravasation.  There is moderate fat stranding and hemorrhage in the region of the hepatic flexure and thickening of the right lateral conal fascia.  No bowel obstruction.  No free fluid.     CT Chest (7/18)  There is no  CT evidence of pulmonary thromboembolism.  No enlarged hilar or mediastinal lymph nodes are seen.  No suspicious pulmonary nodules or masses are noted.  There is an area of pneumonia in the left upper lobe and mild dependent atelectasis in both lower lobes.  There is a trace left pleural fluid collection.    Chest X-Ray (7/22)  Medical support devices project in stable radiographic position.  Cardiomediastinal silhouette is unchanged.  There are low lung volumes bilaterally with elevation of the right hemidiaphragm.  There is continued patchy consolidation projecting over the left upper lung zone.  No definite new confluent airspace consolidation or pneumothorax appreciated.    Assessment     Jose Martin Hutchins is a 78 y.o. male with multiple comorbidities including chronic inflammatory demyelinating polyneuritis. He was transferred to [facility] for higher level of care and need for interventional radiology for a retroperitoneal hematoma.     Patient acutely ill due to combination of retroperitoneal hematoma as well as upper lobe pneumonia found on CT chest. White count spike to 30 within the past 2 days and 2 episodes of hypotension. Will cover pneumonia since patient has risk factors for nosocomial organisms and aspiration (seizure like episode).    Patient has been on prolonged courses of high dose steroids, therefore can be considered immunosuppressed. For this reason, organisms other than classic bacteria should be considered. Patient with acute worsening this morning. Repeat chest x -ray showed moderate improvement from prior. Will follow CT head.     Recommendations     Left Upper Lobe Pneumonia  - Continue Cefepime 2g IV q212h for 7 day duration; will watch closely for mental status changes  - Continue Metronidazole 500mg IV q8h for 7 day duration  - Completed Doxycycline 5 day therapy for atypical coverage; can discontinue at this time.  - Will continue to follow culture results from bronch on 7/23  -  Chest x-ray with moderate improvement from prior   - Recommend repeat CT of abdomen due to epigastric pain worsening today along with decline in clinical picture    Thrush  - Recommend continuing Clotrimazole 10 mg 5 times daily    HSV Stomatitis  - Continue Valacyclovir 1000 BID    Thank you for the consult. Please call with any questions.    Emma Jean Baptiste MD  [facility] Resident, -I

## 2019-07-25 NOTE — SUBJECTIVE & OBJECTIVE
Interval History: Mr. Hutchins had worsening mental status today and increased abdominal pain with WBC trending upward. Patient seen in radiology where he is getting CT abdomen/pelvis to re-evaluate intra-abdominal hematoma.     Medications:  Continuous Infusions:  Scheduled Meds:   acyclovir  5 mg/kg Intravenous Q12H    ceFEPime (MAXIPIME) IVPB  2 g Intravenous Q12H    clotrimazole  10 mg Oral 5x Daily    docusate sodium  100 mg Oral BID    doxycycline (VIBRAMYCIN) IVPB  100 mg Intravenous Q12H    DULoxetine  30 mg Oral Daily    famotidine (PF)  20 mg Intravenous Daily    folic acid  1 mg Oral Daily    furosemide  20 mg Intravenous Daily    insulin detemir U-100  12 Units Subcutaneous QHS    Lactobacillus acidoph-L.bulgar  1 tablet Oral TID WM    lidocaine (PF)  10 mL Other Once    lidocaine (PF)  4 mL Nebulization Once    lidocaine HCL 2%  2 mL Topical (Top) Once    metronidazole  500 mg Intravenous Q8H    polyethylene glycol  17 g Oral BID    predniSONE  15 mg Oral Daily    tamsulosin  0.4 mg Oral Daily    triamcinolone acetonide 0.1%   Mouth/Throat BID     PRN Meds:sodium chloride, sodium chloride, acetaminophen, benzocaine, dextrose 50 % in water (D50W), dextrose 50 % in water (D50W), fentaNYL, glucagon (human recombinant), glucose, glucose, glucose, glucose, HYDROcodone-acetaminophen, insulin aspart U-100, lidocaine HCL 2%, magic mouthwash (diphenhydrAMINE 12.5 mg/5 mL 20 mL, aluminum & magnesium hydroxide-simethicone (MYLANTA) 20 mL, lidocaine HCl 2% (XYLOCAINE) 20 mL) solution, midazolam, morphine, naloxone, nitroGLYCERIN, ramelteon, sodium chloride 0.9%, sodium chloride 0.9%, sodium chloride 0.9%     Review of patient's allergies indicates:   Allergen Reactions    Sulfa (sulfonamide antibiotics) Hives    Ramucirumab Palpitations     Objective:     Vital Signs (24h Range):  Temp:  [96.2 °F (35.7 °C)-98 °F (36.7 °C)] 97.6 °F (36.4 °C)  Pulse:  [] 114  Resp:  [17-18] 18  SpO2:   [93 %-97 %] 97 %  BP: ()/(62-82) 117/72       Lines/Drains/Airways     Central Venous Catheter Line                 Trialysis (Dialysis) Catheter 07/10/19 left subclavian 15 days                Physical Exam  Constitutional: lethargic.  NAD.  Eyes: EOM I Bilaterally  Head/Face: Normocephalic.  Negative paranasal sinus pressure/tenderness.  Salivary glands WNL.  House Brackmann I Bilaterally.     Right Ear: External Auditory Canal WNL,TM w/o masses/lesions/perforations.  Auricle WNL.  Left Ear: External Auditory Canal WNL,TM w/o masses/lesions/perforations. Auricle WNL.  Nose: No gross nasal septal deviation. Inferior Turbinates 3+ bilaterally. No septal perforation. No masses/lesions. External nasal skin without masses/lesions.  Oral Cavity: Gingiva/lips WNL.  FOM Soft, no masses palpated. Oral Tongue mobile with white plaques present that scrape off; 3 rhomboid atrophic, erythematous patches on the dorsal anterior  surface of the tongue . Hard Palate WNL.   Oropharynx: BOT WNL. No masses/lesions noted. Tonsillar fossa/pharyngeal wall without lesions. Posterior oropharynx WNL.  Soft palate without masses. Midline uvula.   Neck/Lymphatic: No LAD I-VI bilaterally.  No thyromegaly.  No masses noted on exam.     Mirror laryngoscopy/nasopharyngoscopy: Active gag reflex.  Unable to perform.     Neuro/Psychiatric: oriented to name only.    Cardiovascular: Normal carotid pulses bilaterally, no increasing jugular venous distention noted at cervical region bilaterally.    Respiratory: Normal respiratory effort, no stridor, no retractions noted.     Significant Labs:  Tongue lesion- PCR positive for HSV-1    CBC:   Recent Labs   Lab 07/25/19  0653   WBC 15.76*   RBC 3.64*   HGB 10.6*   HCT 32.1*      MCV 88   MCH 29.1   MCHC 33.0     CMP:   Recent Labs   Lab 07/25/19  0653   *   CALCIUM 8.9   ALBUMIN 3.0*   PROT 5.6*   *   K 3.0*   CO2 28   CL 93*   BUN 30*   CREATININE 1.3   ALKPHOS 70   ALT 40   AST  61*   BILITOT 1.3*     Coagulation:   Recent Labs   Lab 07/25/19  0653   LABPROT 12.3   INR 1.2   APTT 34.3*

## 2019-07-25 NOTE — PROGRESS NOTES
Pharmacist Renal Dose Adjustment Note    Jose Martin Hutchins is a 78 y.o. male being treated with the medication acyclovir    Patient Data:    Vital Signs (Most Recent):  Temp: 97.6 °F (36.4 °C) (07/25/19 1621)  Pulse: (!) 114 (07/25/19 1621)  Resp: 18 (07/25/19 1621)  BP: 117/72 (07/25/19 1621)  SpO2: 97 % (07/25/19 1608)   Vital Signs (72h Range):  Temp:  [96.1 °F (35.6 °C)-98.7 °F (37.1 °C)]   Pulse:  []   Resp:  [14-22]   BP: ()/(48-89)   SpO2:  [92 %-100 %]      Recent Labs   Lab 07/23/19  0530 07/24/19  0625 07/25/19  0653   CREATININE 1.9* 1.5* 1.3     Serum creatinine: 1.3 mg/dL 07/25/19 0653  Estimated creatinine clearance: 47.6 mL/min    Medication:acyclovir  dose: 370 mg frequency q8h will be changed to medication:acyclovir dose:370 mg frequency:q12h   Pharmacist's Name: Elisa Toro  Pharmacist's Extension: 3500

## 2019-07-25 NOTE — SUBJECTIVE & OBJECTIVE
Interval History: Patient is doing well this morning. Patient was scope by ENT yesterday. Continues to have tongue lesions. Patient was started on anti-virals yesterday. Family would like to stay one more day to continue to work with the speech therapist. Denies any fever, chills or diarrhea. Patient was able to urinate after becerril removal yesterday    Review of Systems   Constitutional: Negative for activity change, appetite change, chills, fatigue and fever.   Respiratory: Negative for cough, choking, shortness of breath and wheezing.    Cardiovascular: Negative for chest pain and leg swelling.   Gastrointestinal: Negative for abdominal pain, blood in stool, constipation, diarrhea and nausea.   Skin: Negative for rash and wound.   Neurological: Positive for weakness. Negative for dizziness, light-headedness and headaches.     Objective:     Vital Signs (Most Recent):  Temp: 97.4 °F (36.3 °C) (07/25/19 0824)  Pulse: 101 (07/25/19 0824)  Resp: 18 (07/25/19 0824)  BP: 114/74 (07/25/19 0824)  SpO2: (!) 93 % (07/25/19 0445) Vital Signs (24h Range):  Temp:  [96.2 °F (35.7 °C)-98.2 °F (36.8 °C)] 97.4 °F (36.3 °C)  Pulse:  [] 101  Resp:  [14-18] 18  SpO2:  [93 %-96 %] 93 %  BP: ()/(48-87) 114/74     Weight: 74.5 kg (164 lb 3.9 oz)  Body mass index is 24.25 kg/m².    Intake/Output Summary (Last 24 hours) at 7/25/2019 0919  Last data filed at 7/25/2019 0609  Gross per 24 hour   Intake 1300 ml   Output 625 ml   Net 675 ml      Physical Exam   Constitutional: He is oriented to person, place, and time. He appears well-developed.   HENT:   Head: Normocephalic and atraumatic.   Mouth/Throat: No oropharyngeal exudate.   Lesions on tongue   Eyes: EOM are normal. Right eye exhibits no discharge. Left eye exhibits no discharge. No scleral icterus.   Neck: Normal range of motion. No thyromegaly present.   Cardiovascular: Normal rate, regular rhythm, normal heart sounds and intact distal pulses. Exam reveals no gallop and  no friction rub.   No murmur heard.  Trialysis Cath in the left subclavian    Pulmonary/Chest: Effort normal and breath sounds normal. No stridor. No respiratory distress. He has no wheezes. He has no rales. He exhibits no tenderness.   Abdominal: Soft. Bowel sounds are normal. He exhibits no distension. There is no tenderness. There is no guarding.   Genitourinary:   Genitourinary Comments: Esparza in place   Musculoskeletal: He exhibits no edema or deformity.   Neurological: He is alert and oriented to person, place, and time.   Alert and oriented to self, place, and time.   Strength: 3/5 in bilateral upper extremities; 2/5 in the bilateral lower extremities   Skin: Skin is warm. No rash noted. No erythema.   Nursing note and vitals reviewed.      Significant Labs:   CBC:   Recent Labs   Lab 07/24/19 0625 07/25/19  0653   WBC 14.40* 15.76*   HGB 9.5* 10.6*   HCT 29.0* 32.1*    187     CMP:   Recent Labs   Lab 07/24/19 0625 07/25/19  0653   * 131*   K 3.0* 3.0*   CL 92* 93*   CO2 25 28   * 146*   BUN 32* 30*   CREATININE 1.5* 1.3   CALCIUM 8.3* 8.9   PROT 5.2* 5.6*   ALBUMIN 2.9* 3.0*   BILITOT 1.0 1.3*   ALKPHOS 64 70   AST 48* 61*   ALT 37 40   ANIONGAP 10 10   EGFRNONAA 44* 53*     Magnesium:   Recent Labs   Lab 07/24/19 0625 07/25/19  0653   MG 1.5* 1.7       Significant Imaging:

## 2019-07-25 NOTE — PLAN OF CARE
Problem: Adult Inpatient Plan of Care  Goal: Plan of Care Review  Outcome: Ongoing (interventions implemented as appropriate)     07/25/19 6622   Plan of Care Review   Plan of Care Reviewed With patient;spouse   Pt resting well this shift,turned with pillows prn,meds admin as per orders on MAR,wife present at bedside,safety and comfort maintained.SCDS to BLE.

## 2019-07-25 NOTE — ASSESSMENT & PLAN NOTE
Patient presented to hospital with Esparza in place for urinary retention  Due to low urinary output, Lasix was started and now is discontinued  Esparza discontinued yesterday  Continue to monitor urine output

## 2019-07-25 NOTE — ASSESSMENT & PLAN NOTE
Pt transferred from [facility] with intraperitoneal hemorrhage requesting IR consult for embolization  CT abdomen: Acute approximately 15 x 4 cm hematoma within the region of the lesser sac with signs of active contrast extravasation.  IR consulted, currently no need for embolization at this time  Surgery consulted, signed off  Will continue to monitor H/H  H/H stable

## 2019-07-25 NOTE — PROGRESS NOTES
[facility]  Progress Note    Patient Name: Jose Martin Hutchins  MRN: [id number]  Patient Class: IP- Inpatient   Admission Date: 7/19/2019  Length of Stay: 6 days  Attending Physician: Al Boone III, MD  Primary Care Provider: Sam Washington MD        Subjective:     Principal Problem:Intraperitoneal hemorrhage      HPI:  79 yo male with pmhx of HTN, CAD, HLD, CKD, GERD, RA, CIDP, Guillain Burre syndrome and A.fib was transferred from [facility] for IR to drain his intraperitoneal hemorrhage. He was admitted in [facility] 8 days ago. He was initially admitted for elective plasmapheresis since his CIDP was worsening. During his stay he became septic and was found to have UTI. He was treated for UTI. While he was in the general floor, he developed fever, hypotensive and became hemodynamically unstable with a drop in his h/h. He had a CT abdomen done which was positive for intraperitoneal hemorrhage that needed to be assessed by IR for possible draining and embolizing the source.   During his transfer to [facility], EMS noticed that the pt had few episodes where his eyes rolled back and his body was shaking. He was also noticed to have similar episode when he was first seen in the ICU. During those episodes his BP was noticeably low as well. Pt was awake upon verbal command. He knows his name but is not oriented to time. He denies any chest pain, sob, nausea, vomiting. He does complaint of pain in his right side of the abdomen.  He has elevated WBC, but no growth from cultures in the outside facility.            Overview/Hospital Course:  No notes on file    Interval History: Patient is doing well this morning. Patient was scope by ENT yesterday. Continues to have tongue lesions. Patient was started on anti-virals yesterday. Family would like to stay one more day to continue to work with the speech therapist. Denies any fever, chills or diarrhea. Patient was able to urinate after becerril  removal yesterday    Review of Systems   Constitutional: Negative for activity change, appetite change, chills, fatigue and fever.   Respiratory: Negative for cough, choking, shortness of breath and wheezing.    Cardiovascular: Negative for chest pain and leg swelling.   Gastrointestinal: Negative for abdominal pain, blood in stool, constipation, diarrhea and nausea.   Skin: Negative for rash and wound.   Neurological: Positive for weakness. Negative for dizziness, light-headedness and headaches.     Objective:     Vital Signs (Most Recent):  Temp: 97.4 °F (36.3 °C) (07/25/19 0824)  Pulse: 101 (07/25/19 0824)  Resp: 18 (07/25/19 0824)  BP: 114/74 (07/25/19 0824)  SpO2: (!) 93 % (07/25/19 0445) Vital Signs (24h Range):  Temp:  [96.2 °F (35.7 °C)-98.2 °F (36.8 °C)] 97.4 °F (36.3 °C)  Pulse:  [] 101  Resp:  [14-18] 18  SpO2:  [93 %-96 %] 93 %  BP: ()/(48-87) 114/74     Weight: 74.5 kg (164 lb 3.9 oz)  Body mass index is 24.25 kg/m².    Intake/Output Summary (Last 24 hours) at 7/25/2019 0919  Last data filed at 7/25/2019 0609  Gross per 24 hour   Intake 1300 ml   Output 625 ml   Net 675 ml      Physical Exam   Constitutional: He is oriented to person, place, and time. He appears well-developed.   HENT:   Head: Normocephalic and atraumatic.   Mouth/Throat: No oropharyngeal exudate.   Lesions on tongue   Eyes: EOM are normal. Right eye exhibits no discharge. Left eye exhibits no discharge. No scleral icterus.   Neck: Normal range of motion. No thyromegaly present.   Cardiovascular: Normal rate, regular rhythm, normal heart sounds and intact distal pulses. Exam reveals no gallop and no friction rub.   No murmur heard.  Trialysis Cath in the left subclavian    Pulmonary/Chest: Effort normal and breath sounds normal. No stridor. No respiratory distress. He has no wheezes. He has no rales. He exhibits no tenderness.   Abdominal: Soft. Bowel sounds are normal. He exhibits no distension. There is no tenderness.  There is no guarding.   Genitourinary:   Genitourinary Comments: Esparza in place   Musculoskeletal: He exhibits no edema or deformity.   Neurological: He is alert and oriented to person, place, and time.   Alert and oriented to self, place, and time.   Strength: 3/5 in bilateral upper extremities; 2/5 in the bilateral lower extremities   Skin: Skin is warm. No rash noted. No erythema.   Nursing note and vitals reviewed.      Significant Labs:   CBC:   Recent Labs   Lab 07/24/19  0625 07/25/19  0653   WBC 14.40* 15.76*   HGB 9.5* 10.6*   HCT 29.0* 32.1*    187     CMP:   Recent Labs   Lab 07/24/19 0625 07/25/19  0653   * 131*   K 3.0* 3.0*   CL 92* 93*   CO2 25 28   * 146*   BUN 32* 30*   CREATININE 1.5* 1.3   CALCIUM 8.3* 8.9   PROT 5.2* 5.6*   ALBUMIN 2.9* 3.0*   BILITOT 1.0 1.3*   ALKPHOS 64 70   AST 48* 61*   ALT 37 40   ANIONGAP 10 10   EGFRNONAA 44* 53*     Magnesium:   Recent Labs   Lab 07/24/19 0625 07/25/19  0653   MG 1.5* 1.7       Significant Imaging:            Assessment/Plan:      * Intraperitoneal hemorrhage  Pt transferred from [facility] with intraperitoneal hemorrhage requesting IR consult for embolization  CT abdomen: Acute approximately 15 x 4 cm hematoma within the region of the lesser sac with signs of active contrast extravasation.  IR consulted, currently no need for embolization at this time  Surgery consulted, signed off  Will continue to monitor H/H  H/H stable    ANDIE (acute kidney injury)  BUN/Cr upon admission: 54/2.8. Baseline 35/1.2.   Likely 2/2 medications vs intravascular volume depletion.   Will start IVF and continue to monitor.   Cr continuing to downtrend, now resolved  Avoid nephrotoxic agents.       Type 2 diabetes mellitus, without long-term current use of insulin  Continue Insulin determir 12 units QHS along with moderate dose correcting scale.   POCT glucose QID  AM glucose goal < 180  Will continue to monitor.       Sepsis  Unclear etiology,  concern for Pneumonia  WBC on admission 28 and patient was on pressors for hypotension  Blood cultures from outside hospital: NGTD  ID consulted: Continue Cefepime, Flagyl, Vancomycin, Doxycycline  Consulted Pulm for bronchoscopy for fungal cultures  AFB stain: Negative, Fungal cultures are still in process  7/19 Blood cultures: NGTD  WBC downtrending       Herpes Stomatitis      Tongue lesion swab positive for HSV-1       Started on Valacyclovir 1,000mg BID for 7 days        Seizure-like activity  Seizure like activity noticed, likely 2/2 hypotensive episodes.  No further episodes in the hospitalization   EEG: No signs of seizure activity  2 mg Ativan IV for generalized seizures lasting > 5 min  Delirium and seizure precautions.       Urinary retention  Patient presented to hospital with Esparza in place for urinary retention  Due to low urinary output, Lasix was started and now is discontinued  Esparza discontinued yesterday  Continue to monitor urine output    Paroxysmal atrial fibrillation  Patient with history of A.fib in the outside facility  Currently NSR  HR goal < 120  Will continue to monitor.     Essential hypertension  Hold home BP medications  Levophed discontinued 7/20  BP goal < 140/80    CIDP (chronic inflammatory demyelinating polyneuropathy)  Consult Neuro and appreciate rec; Neurology signed off  Continue Prednisone 15mg daily  Patient to follow up with Neuro outpatient after discharge        VTE Risk Mitigation (From admission, onward)        Ordered     IP VTE HIGH RISK PATIENT  Once      07/19/19 1452     Place JANE hose  Until discontinued      07/19/19 0910     Place sequential compression device  Until discontinued      07/19/19 0910            Mary Lou Gee, PGY-2  [facility]  07/25/2019 9:24 AM

## 2019-07-25 NOTE — ASSESSMENT & PLAN NOTE
Currently being treated appropriately with clotrimazole and IV acylovir for concomitant HSV-1 and candidiasis.  Will monitor for improvement of the tongue with current treatment; if failure to improve consider biopsy with tissue culture.

## 2019-07-25 NOTE — PLAN OF CARE
Problem: SLP Goal  Goal: SLP Goal  Short Term Goals:  1. Pt will participate in BSS to determine least restrictive diet.--ongoing  2. Pt will tolerate clear liquids PO diet with no overt s/s of aspiration. --MET 7/21  3. Pt will safely consume full liquid diet w/ thin liquids w/ no overt s/s of aspiration.--MET 7/24  4. Pt will consume Puree/Thin liquid diet without any audible s/s of aspiration.  5. Rec: ENT consult and dietician consult to ensure added nutritional support.        Outcome: Ongoing (interventions implemented as appropriate)  7/25: Pt continues with poor oral intake but without any overt s/s of aspiration. Reduced ROSA/increased confusion noted this date. Pt required prompting from SLP to avoid oral bolus holding and swallow delay. REC: cont on Puree/thin liquid diet. Soft solid supplements allowed given knowledge of aspiration risks. SLP reviewed recs with pt, RN and family present at the bedside. Frequent oral care needed.

## 2019-07-25 NOTE — PROGRESS NOTES
[facility]  Otorhinolaryngology-Head & Neck Surgery  Progress Note    Subjective:     Post-Op Info:  Procedure(s) (LRB):  BRONCHOSCOPY, FIBEROPTIC, Bronchoalveolar Lavage +/- biospsy (Bilateral)   3 Days Post-Op  Hospital Day: 7     Interval History: Mr. Hutchins had worsening mental status today and increased abdominal pain with WBC trending upward. Patient seen in radiology where he is getting CT abdomen/pelvis to re-evaluate intra-abdominal hematoma.     Medications:  Continuous Infusions:  Scheduled Meds:   acyclovir  5 mg/kg Intravenous Q12H    ceFEPime (MAXIPIME) IVPB  2 g Intravenous Q12H    clotrimazole  10 mg Oral 5x Daily    docusate sodium  100 mg Oral BID    doxycycline (VIBRAMYCIN) IVPB  100 mg Intravenous Q12H    DULoxetine  30 mg Oral Daily    famotidine (PF)  20 mg Intravenous Daily    folic acid  1 mg Oral Daily    furosemide  20 mg Intravenous Daily    insulin detemir U-100  12 Units Subcutaneous QHS    Lactobacillus acidoph-L.bulgar  1 tablet Oral TID WM    lidocaine (PF)  10 mL Other Once    lidocaine (PF)  4 mL Nebulization Once    lidocaine HCL 2%  2 mL Topical (Top) Once    metronidazole  500 mg Intravenous Q8H    polyethylene glycol  17 g Oral BID    predniSONE  15 mg Oral Daily    tamsulosin  0.4 mg Oral Daily    triamcinolone acetonide 0.1%   Mouth/Throat BID     PRN Meds:sodium chloride, sodium chloride, acetaminophen, benzocaine, dextrose 50 % in water (D50W), dextrose 50 % in water (D50W), fentaNYL, glucagon (human recombinant), glucose, glucose, glucose, glucose, HYDROcodone-acetaminophen, insulin aspart U-100, lidocaine HCL 2%, magic mouthwash (diphenhydrAMINE 12.5 mg/5 mL 20 mL, aluminum & magnesium hydroxide-simethicone (MYLANTA) 20 mL, lidocaine HCl 2% (XYLOCAINE) 20 mL) solution, midazolam, morphine, naloxone, nitroGLYCERIN, ramelteon, sodium chloride 0.9%, sodium chloride 0.9%, sodium chloride 0.9%     Review of patient's allergies  indicates:   Allergen Reactions    Sulfa (sulfonamide antibiotics) Hives    Ramucirumab Palpitations     Objective:     Vital Signs (24h Range):  Temp:  [96.2 °F (35.7 °C)-98 °F (36.7 °C)] 97.6 °F (36.4 °C)  Pulse:  [] 114  Resp:  [17-18] 18  SpO2:  [93 %-97 %] 97 %  BP: ()/(62-82) 117/72       Lines/Drains/Airways     Central Venous Catheter Line                 Trialysis (Dialysis) Catheter 07/10/19 left subclavian 15 days                Physical Exam  Constitutional: lethargic.  NAD.  Eyes: EOM I Bilaterally  Head/Face: Normocephalic.  Negative paranasal sinus pressure/tenderness.  Salivary glands WNL.  House Brackmann I Bilaterally.     Right Ear: External Auditory Canal WNL,TM w/o masses/lesions/perforations.  Auricle WNL.  Left Ear: External Auditory Canal WNL,TM w/o masses/lesions/perforations. Auricle WNL.  Nose: No gross nasal septal deviation. Inferior Turbinates 3+ bilaterally. No septal perforation. No masses/lesions. External nasal skin without masses/lesions.  Oral Cavity: Gingiva/lips WNL.  FOM Soft, no masses palpated. Oral Tongue mobile with white plaques present that scrape off; 3 rhomboid atrophic, erythematous patches on the dorsal anterior  surface of the tongue . Hard Palate WNL.   Oropharynx: BOT WNL. No masses/lesions noted. Tonsillar fossa/pharyngeal wall without lesions. Posterior oropharynx WNL.  Soft palate without masses. Midline uvula.   Neck/Lymphatic: No LAD I-VI bilaterally.  No thyromegaly.  No masses noted on exam.     Mirror laryngoscopy/nasopharyngoscopy: Active gag reflex.  Unable to perform.     Neuro/Psychiatric: oriented to name only.    Cardiovascular: Normal carotid pulses bilaterally, no increasing jugular venous distention noted at cervical region bilaterally.    Respiratory: Normal respiratory effort, no stridor, no retractions noted.     Significant Labs:  Tongue lesion- PCR positive for HSV-1    CBC:   Recent Labs   Lab 07/25/19  0653   WBC 15.76*   RBC  3.64*   HGB 10.6*   HCT 32.1*      MCV 88   MCH 29.1   MCHC 33.0     CMP:   Recent Labs   Lab 07/25/19  0653   *   CALCIUM 8.9   ALBUMIN 3.0*   PROT 5.6*   *   K 3.0*   CO2 28   CL 93*   BUN 30*   CREATININE 1.3   ALKPHOS 70   ALT 40   AST 61*   BILITOT 1.3*     Coagulation:   Recent Labs   Lab 07/25/19  0653   LABPROT 12.3   INR 1.2   APTT 34.3*           Assessment/Plan:     Glossitis  Currently being treated appropriately with clotrimazole and IV acylovir for concomitant HSV-1 and candidiasis.  Will monitor for improvement of the tongue with current treatment; if failure to improve consider biopsy with tissue culture.       Oropharyngeal candidiasis  Continue Clotrimazole. Will follow and repeat FFL to ensure resolution in 10 -14 days.         Maddie Borja MD  Otorhinolaryngology-Head & Neck Surgery  [facility]

## 2019-07-25 NOTE — PROGRESS NOTES
Attempted wound care consult, Pt not in room, having  CT scan. Returned at 1215, pt sleeping, family requested that pt be allowed to sleep, requested that pt be seen at a later time today.

## 2019-07-25 NOTE — PLAN OF CARE
TN rounded and pt and pt's family. TN discussed anticipated discharge plan with pt's family. Rehab placement pending pt's changes in mental status  per  at [facility]. Pending medical stability possible discharge tomorrow or Monday.       07/25/19 1601   Discharge Reassessment   Assessment Type Discharge Planning Reassessment   Provided patient/caregiver education on the expected discharge date and the discharge plan Yes   Do you have any problems affording any of your prescribed medications? No   Discharge Plan A Rehab   Discharge Plan B Rehab   DME Needed Upon Discharge  none   Anticipated Discharge Disposition Rehab

## 2019-07-25 NOTE — PT/OT/SLP PROGRESS
"Speech Language Pathology Treatment    Patient Name:  Jose Martin Hutchins   MRN:  [id number]  Admitting Diagnosis: Intraperitoneal hemorrhage    Recommendations:                 General Recommendations:  Ongoing swallow assessment  Diet recommendations:  Puree, Liquid Diet Level: Thin   Aspiration Precautions: 1 bite/sip at a time, Alternating bites/sips, Assistance with meals, Avoid talking while eating, Check for pocketing/oral residue, Eliminate distractions, Feed only when awake/alert, Frequent oral care, HOB to 90 degrees, Monitor for s/s of aspiration, Remain upright 30 minutes post meal, Small bites/sips and Standard aspiration precautions   General Precautions: Standard, aspiration, fall, respiratory  Communication strategies:  none    Subjective     Pt seen at the bedside for ongoing swallow assessment. SLP spoke with RN via phone prior to tx who reported family asking SLP re-evaluate swallow function to assess upgrade to soft solids. Pt asleep but awakened to loud verbal stimuli and light. Sister, Ira, and brother-in-law, Rhys, present at the bedside for tx.   Patient goals: "More peaches"     Pain/Comfort:  · Pain Rating 1: 0/10    Objective:     Has the patient been evaluated by SLP for swallowing?   Yes  Keep patient NPO? No   Current Respiratory Status: nasal cannula      Pt's HOB elevated to 90* for trials. Pt's family reported minimal to no intake of meals. Sister reported, "he won't even open it up." Pt initially refused PO trials this date; however, given max A and encouragement, pt consumed tsp bites diced peaches x2 and 1 inch softened eduardo cracker x1. Prolonged and inefficient mastication observed with both consistencies. Pt producing anterior munching of bolus. He was noted to close eyes as if to fall asleep while bolus remained in oral cavity. He required prompting from SLP to swallow held bolus. Delayed swallow initiation noted to palpation. Pt refused liquid wash to clear oral residue 2/2 " pain associated with liquids. Sores observed along alveolar ridge and across tongue. SLP inquired into oral care. Family reported good usage of oral care but noted to be swallowing hydrogen peroxide mouthwash. SLP reviewed need to expectorate/suction mouth wash and encouraged continued use of frequent and excellent oral care. RN, Lisa, notified of need for suction at the bedside. Pt with decreased ROSA/increased confusion this date, which was mildly improved by SLP visit. Although no s/s of aspiration were appreciated with soft solids, this SLP recs continued adherence to Puree/thin liquid diet to ensure adequate nutrition. Family may bring additional soft solid snacks to increase oral intake given pt fully awake/alert and aware of aspiration risks. SLP reviewed aspiration risks and diet rec with pt, family, and RN--all in agreement.     Assessment:     Jose Martin Hutchins is a 78 y.o. male with an SLP diagnosis of Dysphagia.  He presents with continued poor oral intake and odynophagia. REC: continue on Puree/thin liquid diet for now with soft solid supplements as desired should pt be fully awake/alert. Frequent and aggressive oral care needed. SLP to f/u.    Goals:   Multidisciplinary Problems     SLP Goals        Problem: SLP Goal    Goal Priority Disciplines Outcome   SLP Goal     SLP Ongoing (interventions implemented as appropriate)   Description:  Short Term Goals:  1. Pt will participate in BSS to determine least restrictive diet.--ongoing  2. Pt will tolerate clear liquids PO diet with no overt s/s of aspiration. --MET 7/21  3. Pt will safely consume full liquid diet w/ thin liquids w/ no overt s/s of aspiration.--MET 7/24  4. Pt will consume Puree/Thin liquid diet without any audible s/s of aspiration.  5. Rec: ENT consult and dietician consult to ensure added nutritional support.                          Plan:     · Patient to be seen:  3 x/week   · Plan of Care expires:  08/19/19  · Plan of Care  reviewed with:  patient, family   · SLP Follow-Up:  Yes       Discharge recommendations:  other (see comments)(TBD)   Barriers to Discharge:  None    Time Tracking:     SLP Treatment Date:   07/25/19  Speech Start Time:  1407  Speech Stop Time:  1426     Speech Total Time (min):  19 min    Billable Minutes: Treatment Swallowing Dysfunction 19 mins    Mary Kate Cohn CCC-SLP  07/25/2019

## 2019-07-25 NOTE — ASSESSMENT & PLAN NOTE
Seizure like activity noticed, likely 2/2 hypotensive episodes.  No further episodes in the hospitalization   EEG: No signs of seizure activity  2 mg Ativan IV for generalized seizures lasting > 5 min  Delirium and seizure precautions.

## 2019-07-25 NOTE — PT/OT/SLP PROGRESS
Physical Therapy      Patient Name:  Jose Martin Hutchins   MRN:  [id number]    Patient not seen today secondary to nsg hold -- patient with increased confusion, chills, unable to state name. MD team to assess patient. Will follow-up later, when more appropriate.    Isabela Tinoco, PT   7/25/2019

## 2019-07-26 PROBLEM — B00.2: Status: ACTIVE | Noted: 2019-07-26

## 2019-07-26 PROBLEM — E87.8 ELECTROLYTE ABNORMALITY: Status: ACTIVE | Noted: 2019-07-26

## 2019-07-26 PROBLEM — R40.0 SOMNOLENCE: Status: ACTIVE | Noted: 2019-07-26

## 2019-07-26 LAB
ALBUMIN SERPL BCP-MCNC: 2.8 G/DL (ref 3.5–5.2)
ALP SERPL-CCNC: 63 U/L (ref 55–135)
ALT SERPL W/O P-5'-P-CCNC: 40 U/L (ref 10–44)
ANION GAP SERPL CALC-SCNC: 9 MMOL/L (ref 8–16)
ANISOCYTOSIS BLD QL SMEAR: SLIGHT
APTT BLDCRRT: 35.2 SEC (ref 21–32)
AST SERPL-CCNC: 66 U/L (ref 10–40)
BASOPHILS # BLD AUTO: ABNORMAL K/UL (ref 0–0.2)
BASOPHILS NFR BLD: 0 % (ref 0–1.9)
BILIRUB SERPL-MCNC: 1.1 MG/DL (ref 0.1–1)
BUN SERPL-MCNC: 29 MG/DL (ref 8–23)
CALCIUM SERPL-MCNC: 8.6 MG/DL (ref 8.7–10.5)
CHLORIDE SERPL-SCNC: 95 MMOL/L (ref 95–110)
CO2 SERPL-SCNC: 27 MMOL/L (ref 23–29)
CREAT SERPL-MCNC: 1.3 MG/DL (ref 0.5–1.4)
DIFFERENTIAL METHOD: ABNORMAL
EOSINOPHIL # BLD AUTO: ABNORMAL K/UL (ref 0–0.5)
EOSINOPHIL NFR BLD: 0 % (ref 0–8)
ERYTHROCYTE [DISTWIDTH] IN BLOOD BY AUTOMATED COUNT: 17.8 % (ref 11.5–14.5)
EST. GFR  (AFRICAN AMERICAN): >60 ML/MIN/1.73 M^2
EST. GFR  (NON AFRICAN AMERICAN): 53 ML/MIN/1.73 M^2
GLUCOSE SERPL-MCNC: 136 MG/DL (ref 70–110)
HCT VFR BLD AUTO: 31.5 % (ref 40–54)
HGB BLD-MCNC: 10.4 G/DL (ref 14–18)
INR PPP: 1.2 (ref 0.8–1.2)
LYMPHOCYTES # BLD AUTO: ABNORMAL K/UL (ref 1–4.8)
LYMPHOCYTES NFR BLD: 0 % (ref 18–48)
MAGNESIUM SERPL-MCNC: 1.5 MG/DL (ref 1.6–2.6)
MCH RBC QN AUTO: 29.2 PG (ref 27–31)
MCHC RBC AUTO-ENTMCNC: 33 G/DL (ref 32–36)
MCV RBC AUTO: 89 FL (ref 82–98)
MONOCYTES # BLD AUTO: ABNORMAL K/UL (ref 0.3–1)
MONOCYTES NFR BLD: 4 % (ref 4–15)
NEUTROPHILS NFR BLD: 95 % (ref 38–73)
NEUTS BAND NFR BLD MANUAL: 1 %
OVALOCYTES BLD QL SMEAR: ABNORMAL
PHOSPHATE SERPL-MCNC: 1.6 MG/DL (ref 2.7–4.5)
PLATELET # BLD AUTO: 200 K/UL (ref 150–350)
PLATELET BLD QL SMEAR: ABNORMAL
PMV BLD AUTO: 8.7 FL (ref 9.2–12.9)
POCT GLUCOSE: 130 MG/DL (ref 70–110)
POCT GLUCOSE: 176 MG/DL (ref 70–110)
POCT GLUCOSE: 199 MG/DL (ref 70–110)
POCT GLUCOSE: 206 MG/DL (ref 70–110)
POIKILOCYTOSIS BLD QL SMEAR: SLIGHT
POTASSIUM SERPL-SCNC: 2.8 MMOL/L (ref 3.5–5.1)
PROT SERPL-MCNC: 5.3 G/DL (ref 6–8.4)
PROTHROMBIN TIME: 12.1 SEC (ref 9–12.5)
RBC # BLD AUTO: 3.56 M/UL (ref 4.6–6.2)
SODIUM SERPL-SCNC: 131 MMOL/L (ref 136–145)
WBC # BLD AUTO: 15.83 K/UL (ref 3.9–12.7)

## 2019-07-26 PROCEDURE — S0030 INJECTION, METRONIDAZOLE: HCPCS | Performed by: STUDENT IN AN ORGANIZED HEALTH CARE EDUCATION/TRAINING PROGRAM

## 2019-07-26 PROCEDURE — 36415 COLL VENOUS BLD VENIPUNCTURE: CPT

## 2019-07-26 PROCEDURE — 25000003 PHARM REV CODE 250: Performed by: STUDENT IN AN ORGANIZED HEALTH CARE EDUCATION/TRAINING PROGRAM

## 2019-07-26 PROCEDURE — 63600175 PHARM REV CODE 636 W HCPCS: Performed by: FAMILY MEDICINE

## 2019-07-26 PROCEDURE — 84100 ASSAY OF PHOSPHORUS: CPT

## 2019-07-26 PROCEDURE — 80053 COMPREHEN METABOLIC PANEL: CPT

## 2019-07-26 PROCEDURE — 85730 THROMBOPLASTIN TIME PARTIAL: CPT

## 2019-07-26 PROCEDURE — 85610 PROTHROMBIN TIME: CPT

## 2019-07-26 PROCEDURE — 11000001 HC ACUTE MED/SURG PRIVATE ROOM

## 2019-07-26 PROCEDURE — 63600175 PHARM REV CODE 636 W HCPCS: Performed by: STUDENT IN AN ORGANIZED HEALTH CARE EDUCATION/TRAINING PROGRAM

## 2019-07-26 PROCEDURE — 83735 ASSAY OF MAGNESIUM: CPT

## 2019-07-26 PROCEDURE — 97530 THERAPEUTIC ACTIVITIES: CPT

## 2019-07-26 PROCEDURE — 85027 COMPLETE CBC AUTOMATED: CPT

## 2019-07-26 PROCEDURE — 94761 N-INVAS EAR/PLS OXIMETRY MLT: CPT

## 2019-07-26 PROCEDURE — 25000003 PHARM REV CODE 250: Performed by: FAMILY MEDICINE

## 2019-07-26 PROCEDURE — 27000221 HC OXYGEN, UP TO 24 HOURS

## 2019-07-26 PROCEDURE — 85007 BL SMEAR W/DIFF WBC COUNT: CPT

## 2019-07-26 PROCEDURE — S0028 INJECTION, FAMOTIDINE, 20 MG: HCPCS | Performed by: STUDENT IN AN ORGANIZED HEALTH CARE EDUCATION/TRAINING PROGRAM

## 2019-07-26 PROCEDURE — 97110 THERAPEUTIC EXERCISES: CPT

## 2019-07-26 RX ORDER — MAGNESIUM SULFATE HEPTAHYDRATE 40 MG/ML
2 INJECTION, SOLUTION INTRAVENOUS ONCE
Status: COMPLETED | OUTPATIENT
Start: 2019-07-26 | End: 2019-07-26

## 2019-07-26 RX ADMIN — ACYCLOVIR SODIUM 370 MG: 500 INJECTION, SOLUTION INTRAVENOUS at 06:07

## 2019-07-26 RX ADMIN — FAMOTIDINE 20 MG: 10 INJECTION, SOLUTION INTRAVENOUS at 09:07

## 2019-07-26 RX ADMIN — CEFEPIME 2 G: 2 INJECTION, POWDER, FOR SOLUTION INTRAVENOUS at 12:07

## 2019-07-26 RX ADMIN — METRONIDAZOLE 500 MG: 500 INJECTION, SOLUTION INTRAVENOUS at 10:07

## 2019-07-26 RX ADMIN — METRONIDAZOLE 500 MG: 500 INJECTION, SOLUTION INTRAVENOUS at 02:07

## 2019-07-26 RX ADMIN — FOLIC ACID 1 MG: 1 TABLET ORAL at 09:07

## 2019-07-26 RX ADMIN — LACTOBACILLUS TAB 1 TABLET: TAB at 09:07

## 2019-07-26 RX ADMIN — POTASSIUM PHOSPHATE, MONOBASIC AND POTASSIUM PHOSPHATE, DIBASIC 30 MMOL: 224; 236 INJECTION, SOLUTION, CONCENTRATE INTRAVENOUS at 01:07

## 2019-07-26 RX ADMIN — MAGNESIUM SULFATE IN WATER 2 G: 40 INJECTION, SOLUTION INTRAVENOUS at 09:07

## 2019-07-26 RX ADMIN — INSULIN DETEMIR 12 UNITS: 100 INJECTION, SOLUTION SUBCUTANEOUS at 08:07

## 2019-07-26 RX ADMIN — METRONIDAZOLE 500 MG: 500 INJECTION, SOLUTION INTRAVENOUS at 05:07

## 2019-07-26 RX ADMIN — ACYCLOVIR SODIUM 370 MG: 500 INJECTION, SOLUTION INTRAVENOUS at 08:07

## 2019-07-26 RX ADMIN — PREDNISONE 15 MG: 10 TABLET ORAL at 09:07

## 2019-07-26 RX ADMIN — CEFEPIME 2 G: 2 INJECTION, POWDER, FOR SOLUTION INTRAVENOUS at 10:07

## 2019-07-26 RX ADMIN — POTASSIUM CHLORIDE AND SODIUM CHLORIDE 75 ML/HR: 450; 150 INJECTION, SOLUTION INTRAVENOUS at 04:07

## 2019-07-26 RX ADMIN — DULOXETINE 30 MG: 30 CAPSULE, DELAYED RELEASE ORAL at 09:07

## 2019-07-26 RX ADMIN — TAMSULOSIN HYDROCHLORIDE 0.4 MG: 0.4 CAPSULE ORAL at 09:07

## 2019-07-26 RX ADMIN — DOXYCYCLINE 100 MG: 100 INJECTION, POWDER, LYOPHILIZED, FOR SOLUTION INTRAVENOUS at 04:07

## 2019-07-26 RX ADMIN — TRIAMCINOLONE ACETONIDE: 1 PASTE TOPICAL at 09:07

## 2019-07-26 NOTE — PLAN OF CARE
Problem: Adult Inpatient Plan of Care  Goal: Plan of Care Review  Outcome: Ongoing (interventions implemented as appropriate)  Patient resting in bed, oriented only to self. Family member at the bedside, attentive to patient. Medications administered as ordered. No complaints of pain or discomfort. Patient's BP low early in the shift, bolus administered with improvement. Telemetry on, NSR to tachy. Encouraged to call with needs or concerns. Will continue to monitor.

## 2019-07-26 NOTE — PROGRESS NOTES
Returned to see pt at this time, of floor for testing. Spoke with family will return in the morning.

## 2019-07-26 NOTE — PLAN OF CARE
Problem: Occupational Therapy Goal  Goal: Occupational Therapy Goal  Goals to be met by: 8/20/2019    Patient will increase functional independence with ADLs by performing:    Feeding with Set-up Assistance.  UE Dressing with Minimal Assistance.  LE Dressing with Moderate Assistance.  Grooming while seated with Set-up Assistance.  Toileting from bedside commode with Moderate Assistance for hygiene and clothing management.   Sitting at edge of bed x20 minutes with Supervision.  Rolling to Bilateral with Modified [place].   Supine to sit with Modified [place].  Step transfer with Minimal Assistance  Toilet transfer to bedside commode with Minimal Assistance.  Increased functional strength to WFL for ADLS.  Upper extremity exercise program 10 reps per handout, with supervision.     Outcome: Ongoing (interventions implemented as appropriate)  Patient with decreased cognition/mentation this date. Still remains limited by orthostatic hypotension. Patient's wife and nsg educated on importance of chair position to improve patient's adjustment to positional/upright change. Patient will benefit from IPR to address functional deficits and improve performance in ADL tasks.

## 2019-07-26 NOTE — ASSESSMENT & PLAN NOTE
Pt transferred from [facility] with intraperitoneal hemorrhage requesting IR consult for embolization  CT abdomen: Acute approximately 15 x 4 cm hematoma within the region of the lesser sac with signs of active contrast extravasation.  IR consulted, currently no need for embolization at this time  Surgery consulted, signed off  Will continue to monitor H/H  H/H stable  CT Abdomen was repeated due to change in mental status. Pending read

## 2019-07-26 NOTE — ASSESSMENT & PLAN NOTE
Unclear etiology, concern for Pneumonia  WBC on admission 28 and patient was on pressors for hypotension  Blood cultures from outside hospital: NGTD  ID consulted: Continue Cefepime, Flagyl, Vancomycin, Doxycycline  Consulted Pulm for bronchoscopy for fungal cultures  AFB stain: Negative, Fungal cultures are still in process  7/19 Blood cultures: NGTD

## 2019-07-26 NOTE — PT/OT/SLP PROGRESS
Occupational Therapy   Treatment    Name: Jose Martin Hutchins  MRN: [id number]  Admitting Diagnosis:  Intraperitoneal hemorrhage  4 Days Post-Op    Recommendations:     Discharge Recommendations: rehabilitation facility  Discharge Equipment Recommendations:  wheelchair, manual, hospital bed, lift device  Barriers to discharge:  None    Assessment:   Patient with decreased cognition/mentation this date. Still remains limited by orthostatic hypotension. Patient's wife and nsg educated on importance of chair position to improve patient's adjustment to positional/upright change. Patient will benefit from IPR to address functional deficits and improve performance in ADL tasks.     Jose Martin Hutchins is a 78 y.o. male with a medical diagnosis of Intraperitoneal hemorrhage. Performance deficits affecting function are weakness, impaired endurance, impaired self care skills, impaired functional mobilty, gait instability, impaired balance, impaired cognition, decreased upper extremity function, decreased lower extremity function, decreased coordination, decreased safety awareness, decreased ROM, impaired skin, impaired coordination.     Rehab Prognosis:  Fair+; patient would benefit from acute skilled OT services to address these deficits and reach maximum level of function.       Plan:     Patient to be seen 5 x/week to address the above listed problems via self-care/home management, therapeutic activities, therapeutic exercises  · Plan of Care Expires:    · Plan of Care Reviewed with: patient, spouse    Subjective     Pain/Comfort:  · Pain Rating 1: 0/10  · Pain Rating Post-Intervention 1: 0/10    Objective:     Communicated with: Vikki iverson prior to session.  Patient found HOB elevated with bed alarm, telemetry, peripheral IV, SCD upon OT entry to room.    General Precautions: Standard, aspiration, fall, respiratory   Orthopedic Precautions:N/A   Braces: N/A     Bed Mobility:    · Patient completed Rolling/Turning to  Left with  minimum assistance and with side rail  · Patient completed Scooting/Bridging with minimum assistance and moderate assistance  · Patient completed Supine to Sit with minimum assistance, moderate assistance and with side rail  · Patient completed Sit to Supine with total assistance and 2 persons     Functional Mobility/Transfers:  · N/A    Activities of Daily Living:  · Toileting: dependence incontinent in diaper    [facility] 6 Click ADL: 12    Treatment & Education:  Patient oriented to self. Able to state current president, but unable to state type of place he was in or current year. Patient tolerated BLE TE --see PT note. Transitioned to EOB and sat briefly before becoming orthostatic. Patient with word-finding difficulty this date and unable to express how he was feeling. Patient placed in chair position.     BP readings:   Supine: 94/67,   Sittin, 58,   Trendelenbur/80, HR 97  HOB Elevated: 116/74,     Patient left HOB elevated with all lines intact, call button in reach, chair alarm on, nurse notified and wife presentEducation:      GOALS:   Multidisciplinary Problems     Occupational Therapy Goals        Problem: Occupational Therapy Goal    Goal Priority Disciplines Outcome Interventions   Occupational Therapy Goal     OT, PT/OT Ongoing (interventions implemented as appropriate)    Description:  Goals to be met by: 2019    Patient will increase functional independence with ADLs by performing:    Feeding with Set-up Assistance.  UE Dressing with Minimal Assistance.  LE Dressing with Moderate Assistance.  Grooming while seated with Set-up Assistance.  Toileting from bedside commode with Moderate Assistance for hygiene and clothing management.   Sitting at edge of bed x20 minutes with Supervision.  Rolling to Bilateral with Modified [place].   Supine to sit with Modified [place].  Step transfer with Minimal Assistance  Toilet transfer to bedside commode with  Minimal Assistance.  Increased functional strength to WFL for ADLS.  Upper extremity exercise program 10 reps per handout, with supervision.                      Time Tracking:     OT Date of Treatment: 07/26/19  OT Start Time: 1000  OT Stop Time: 1030  OT Total Time (min): 30 mins co-tx PT    Billable Minutes:Therapeutic Activity 15    GOOD Steve  7/26/2019

## 2019-07-26 NOTE — ASSESSMENT & PLAN NOTE
Tongue lesions swab positive for HSV-1  Started on Valacyclovir 1,000mg BID  Transitioned to Acyclovir IV q8

## 2019-07-26 NOTE — PROGRESS NOTES
[facility] Infectious Disease Progress Note     Primary Team: Family Medicine  Consultant Attending: Alessia  Date of Admit: 7/19/2019    Summary of history     Jose Martin Hutchins is a 78 y.o. male with a relevant history of dyslipidemia, CAD, HTN, mitral regurgitation, tricuspid regurgitation, chronic renal disease, GERD, rheumatoid arthritis, and chronic inflammatory demyelinating polyneuritis.     The patient presented to [facility] on 7/19/2019 as a direct admit to the ICU from [facility] for high level of care and interventional radiology services due to suspected retroperitoneal hematoma.     The patient was initially admitted at [facility] for elective plasmapheresis planned by an outside neurologist for his worsening CIPD. During admission, he had a fever of 102 at which time urine cultures, blood cultures, and urinalysis were done. Cultures remained negative but urinalysis was suspect for UTI which was treated with vancomycin and ceftriaxone for 7 days. Per family, they report he was improving after the two initial plasma exchange treatments but acutely decompensated and became hypotensive yesterday afternoon. At this time, hemoglobin dropped from 11 to 9 and CT chest and abdomen showed upper lobe pneumonia and large hematoma in the lesser sac.     Upon arrival to the [facility] ICU, he became unresponsive while being transferred to the bed with systolic BP in the 70's. He has remained afebrile.    Interval events     Stepped down from ICU to floor 7/22.  Bronchoscopy on 7/23.    Subjective     Patient sleeping comfortably upon entering the room. He is no longer disoriented to person, place, or time ,however, he is confused about his current situation and processing information seems to take longer. Denies any nausea, vomiting, fever, chills, or pain at this time.    Current Medications:     Scheduled:   acyclovir  5 mg/kg Intravenous Q12H    ceFEPime (MAXIPIME) IVPB  2 g Intravenous Q12H     clotrimazole  10 mg Oral 5x Daily    docusate sodium  100 mg Oral BID    doxycycline (VIBRAMYCIN) IVPB  100 mg Intravenous Q12H    DULoxetine  30 mg Oral Daily    famotidine (PF)  20 mg Intravenous Daily    folic acid  1 mg Oral Daily    insulin detemir U-100  12 Units Subcutaneous QHS    Lactobacillus acidoph-L.bulgar  1 tablet Oral TID WM    lidocaine (PF)  10 mL Other Once    lidocaine (PF)  4 mL Nebulization Once    lidocaine HCL 2%  2 mL Topical (Top) Once    metronidazole  500 mg Intravenous Q8H    polyethylene glycol  17 g Oral BID    potassium phosphate IVPB  30 mmol Intravenous Once    predniSONE  15 mg Oral Daily    tamsulosin  0.4 mg Oral Daily        PRN:  sodium chloride, sodium chloride, acetaminophen, benzocaine, dextrose 50 % in water (D50W), dextrose 50 % in water (D50W), fentaNYL, glucagon (human recombinant), glucose, glucose, glucose, glucose, HYDROcodone-acetaminophen, insulin aspart U-100, lidocaine HCL 2%, magic mouthwash (diphenhydrAMINE 12.5 mg/5 mL 20 mL, aluminum & magnesium hydroxide-simethicone (MYLANTA) 20 mL, lidocaine HCl 2% (XYLOCAINE) 20 mL) solution, midazolam, morphine, naloxone, nitroGLYCERIN, ramelteon, sodium chloride 0.9%, sodium chloride 0.9%, sodium chloride 0.9%    Antibiotics and Day Number of Therapy:  Vancomycin:  -   Cefepime:  - current  Metronidazole:  - current  Doxycycline:  -   Valacyclovir:  - current    Objective   Last 24 Hour Vital Signs:  BP  Min: 83/53  Max: 143/73  Temp  Av °F (36.7 °C)  Min: 97.6 °F (36.4 °C)  Max: 98.7 °F (37.1 °C)  Pulse  Av.9  Min: 96  Max: 114  Resp  Av.1  Min: 15  Max: 18  SpO2  Av %  Min: 92 %  Max: 97 %  Weight  Av.1 kg (167 lb 12.3 oz)  Min: 76.1 kg (167 lb 12.3 oz)  Max: 76.1 kg (167 lb 12.3 oz)    Lines, drains, airway:  Trialysis catheter - 7/10  Esparza Catheter -     Physical Examination:  Examination  General: Patient sleeping in NAD.  HEENT: normocephalic,  atraumatic, EOMI  Neck: No thyromegaly or masses   Cardiac: RRR, no murmurs appreciated, no extra heart sounds  Pulmonary/Chest: CTAB, symmetric chest rise.  GI: Soft, no tenderness to palpation of epigastric region, mild distention noted, normoactive bowel sounds  Extremities: no edema, clubbing, or cyanosis  Skin: dry, warm, intact. No bruising or rashes.  Neuro: Alert, oriented to person and place.    Lab data:  CBC:   Lab Results   Component Value Date    WBC 15.83 (H) 07/26/2019    HGB 10.4 (L) 07/26/2019     07/26/2019    MCV 89 07/26/2019    RDW 17.8 (H) 07/26/2019       Estimated Creatinine Clearance: 47.6 mL/min (based on SCr of 1.3 mg/dL).    Microbiology Data  Tongue lesions swab - positive for HSV1  Blood culture (7/11) - negative  Blood culture (7/19) - negative  Respiratory culture - pending  Respiratory gram stain (7/23): rare WBC's, no organisms  Respiratory acid fast: negative  C Diff - negative    Radiology:  CT Abdomen/Pelvis (7/18)  The liver, spleen, gallbladder appear normal.  There is a small amount of perihepatic fluid.  The pancreas and adrenal glands are unremarkable.  There is no hydronephrosis.  Multiple cysts are present in the left kidney.  There is an approximately 15 x 4 cm hematoma within the lesser sac.  Within this hematoma there are areas of increased attenuation suggesting active extravasation.  There is moderate fat stranding and hemorrhage in the region of the hepatic flexure and thickening of the right lateral conal fascia.  No bowel obstruction.  No free fluid.     CT Chest (7/18)  There is no CT evidence of pulmonary thromboembolism.  No enlarged hilar or mediastinal lymph nodes are seen.  No suspicious pulmonary nodules or masses are noted.  There is an area of pneumonia in the left upper lobe and mild dependent atelectasis in both lower lobes.  There is a trace left pleural fluid collection.    Chest X-Ray (7/22)  Medical support devices project in stable  radiographic position.  Cardiomediastinal silhouette is unchanged.  There are low lung volumes bilaterally with elevation of the right hemidiaphragm.  There is continued patchy consolidation projecting over the left upper lung zone.  No definite new confluent airspace consolidation or pneumothorax appreciated.    Assessment     oJse Martin Hutchins is a 78 y.o. male with multiple comorbidities including chronic inflammatory demyelinating polyneuritis. He was transferred to [facility] for higher level of care and need for interventional radiology for a retroperitoneal hematoma.     Patient acutely ill due to combination of retroperitoneal hematoma as well as upper lobe pneumonia found on CT chest. White count spike to 30 within the past 2 days and 2 episodes of hypotension. Will cover pneumonia since patient has risk factors for nosocomial organisms and aspiration (seizure like episode).    Patient has been on prolonged courses of high dose steroids, therefore can be considered immunosuppressed. For this reason, organisms other than classic bacteria should be considered.     Recommendations     Left Upper Lobe Pneumonia  - Completed Cefepime 2g IV q212h for 7 day duration; can be discontinued tomorrow.  - Completed Metronidazole 500mg IV q8h for 7 day duration; can be discontinued tomorrow.  - Completed Doxycycline 5 day therapy for atypical coverage; can discontinue at this time.  - Will continue to follow culture results from bronch on 7/23  - Chest x-ray with moderate improvement from prior     Thrush  - Continue Clotrimazole 10 mg 5 times daily  - Recommend starting Fluconazole 100mg PO daily for 7 days    HSV Stomatitis  - Continue Valacyclovir 1000 BID    Thank you for the consult. Please call with any questions.    Emma Jean Baptiste MD  [facility] Internal Medicine Resident, [facility]

## 2019-07-26 NOTE — PROGRESS NOTES
Wound consult for Stage 3 right buttock wound.  Assessment below, see also consult note.     07/26/19 1035        Pressure Injury 07/10/19 1044 Right medial Buttocks Stage 3   Date First Assessed/Time First Assessed: 07/10/19 1044   Pressure Injury Present on Admission: yes  Side: Right  Orientation: medial  Location: Buttocks  Staging: Stage 3   Staging Stage 3   Dressing Appearance Dry;Intact;Clean   Drainage Amount None   Drainage Characteristics/Odor No odor   Appearance Red;Yellow   Tissue loss description Partial thickness   Red (%), Wound Tissue Color 80 %   Yellow (%), Wound Tissue Color 20 %   Periwound Area Intact;Pinhook   Wound Edges Open   Wound Length (cm) 1 cm   Wound Width (cm) 0.5 cm   Wound Depth (cm) 0.1 cm   Wound Volume (cm^3) 0.05 cm^3   Wound Surface Area (cm^2) 0.5 cm^2   Care Cleansed with:;Sterile normal saline   Dressing Foam   Periwound Care Dry periwound area maintained   Dressing Change Due 07/27/19   Skin Interventions   Pressure Reduction Devices heel offloading device utilized;positioning supports utilized   Pressure Reduction Techniques weight shift assistance provided;heels elevated off bed;frequent weight shift encouraged

## 2019-07-26 NOTE — PLAN OF CARE
TN rounded on patient and pt's family at bedside. Pt sleeping at the time. TN discussed anticipated discharge plan with pt's family; pt possible discharge to [facility] on Monday pending pt's medical stability. Pt's family in agreement with discharge plan.

## 2019-07-26 NOTE — SUBJECTIVE & OBJECTIVE
Interval History: Overnight, patient had another episode of confusion where he was speaking to his mother and throwing the sheets off. This morning, patient was sleeping. No fevers overnight. Patient had a hypotensive episode overnight and required IVFs.    Review of Systems   Unable to perform ROS: Mental status change     Objective:     Vital Signs (Most Recent):  Temp: 98.4 °F (36.9 °C) (07/26/19 0811)  Pulse: (!) 111 (07/26/19 0811)  Resp: 15 (07/26/19 0811)  BP: 103/61 (07/26/19 0820)  SpO2: (!) 94 % (07/26/19 0856) Vital Signs (24h Range):  Temp:  [97.4 °F (36.3 °C)-98.7 °F (37.1 °C)] 98.4 °F (36.9 °C)  Pulse:  [] 111  Resp:  [15-18] 15  SpO2:  [92 %-97 %] 94 %  BP: ()/(51-82) 103/61     Weight: 76.1 kg (167 lb 12.3 oz)  Body mass index is 24.78 kg/m².    Intake/Output Summary (Last 24 hours) at 7/26/2019 0913  Last data filed at 7/26/2019 0515  Gross per 24 hour   Intake 205 ml   Output 300 ml   Net -95 ml      Physical Exam   Cardiovascular: Normal rate, regular rhythm, normal heart sounds and intact distal pulses. Exam reveals no gallop and no friction rub.   No murmur heard.  Pulmonary/Chest: Effort normal and breath sounds normal. No stridor. No respiratory distress. He has no wheezes. He has no rales. He exhibits no tenderness.   Abdominal: Soft. Bowel sounds are normal. He exhibits no distension and no mass. There is no tenderness. There is no rebound and no guarding.   Musculoskeletal: Normal range of motion. He exhibits no edema.   Skin: Skin is warm and dry.       Significant Labs:   CBC:   Recent Labs   Lab 07/25/19  0653 07/25/19  1804 07/26/19  0626   WBC 15.76* 19.84* 15.83*   HGB 10.6* 11.2* 10.4*   HCT 32.1* 33.9* 31.5*    225 200     CMP:   Recent Labs   Lab 07/25/19  0653 07/26/19  0626   * 131*   K 3.0* 2.8*   CL 93* 95   CO2 28 27   * 136*   BUN 30* 29*   CREATININE 1.3 1.3   CALCIUM 8.9 8.6*   PROT 5.6* 5.3*   ALBUMIN 3.0* 2.8*   BILITOT 1.3* 1.1*   ALKPHOS  70 63   AST 61* 66*   ALT 40 40   ANIONGAP 10 9   EGFRNONAA 53* 53*     Magnesium:   Recent Labs   Lab 07/25/19  0653 07/26/19  0626   MG 1.7 1.5*       Significant Imaging:

## 2019-07-26 NOTE — CONSULTS
Wound consult for Stage 3 Right buttock wound.    Pt with Stage 3 right medial buttock wound noted present on admission, a nurse had changed LDA to Stage 2, re-corrected by wound care nurse to Stage 3. Without light the small amount of slough on this very small healing wound was not visible, however with flashlight it was revealed with 20% slough and  to be as was originally staged on admission. Dr. Gee and U med team at bedside for assessment, verbal order for Triad cream to wound bed and joyce wound skin, cover with dry clean dressing, every other day. Wound care performed as directed.    Wound consult for Stage 3 right buttock wound.  Assessment below, see also consult note.       07/26/19 1035        Pressure Injury 07/10/19 1044 Right medial Buttocks Stage 3   Date First Assessed/Time First Assessed: 07/10/19 1044   Pressure Injury Present on Admission: yes  Side: Right  Orientation: medial  Location: Buttocks  Staging: Stage 3   Staging Stage 3   Dressing Appearance Dry;Intact;Clean   Drainage Amount None   Drainage Characteristics/Odor No odor   Appearance Red;Yellow   Tissue loss description Partial thickness   Red (%), Wound Tissue Color 80 %   Yellow (%), Wound Tissue Color 20 %   Periwound Area Intact;Maple Plain   Wound Edges Open   Wound Length (cm) 1 cm   Wound Width (cm) 0.5 cm   Wound Depth (cm) 0.1 cm   Wound Volume (cm^3) 0.05 cm^3   Wound Surface Area (cm^2) 0.5 cm^2   Care Cleansed with:;Sterile normal saline   Dressing Foam   Periwound Care Dry periwound area maintained   Dressing Change Due 07/27/19   Skin Interventions   Pressure Reduction Devices heel offloading device utilized;positioning supports utilized   Pressure Reduction Techniques weight shift assistance provided;heels elevated off bed;frequent weight shift encouraged

## 2019-07-26 NOTE — PLAN OF CARE
Problem: Physical Therapy Goal  Goal: Physical Therapy Goal  Goals to be met by: 2019     Patient will increase functional independence with mobility by performin. Supine to sit with MInimal Assistance  2. Rolling to Left and Right with Minimal Assistance.  3. Sit to stand transfer with Moderate Assistance  3. Bed to chair transfer with Moderate Assistance using Rolling Walker  4. Sitting at edge of bed x10 minutes with Minimal Assistance  3. Lower extremity exercise program x12 reps per handout, with assistance as needed     Outcome: Ongoing (interventions implemented as appropriate)  Patient with orthostatic HOTN sitting EOB; discussed with nurse about placing pt in chair position intermittently throughout the day to assist with BP accommodation; increased strength/movement in BLEs; also noted that pt was oriented to person only, had word finding problems, delayed responses; cont with POC.

## 2019-07-26 NOTE — PLAN OF CARE
Problem: Adult Inpatient Plan of Care  Goal: Plan of Care Review  Outcome: Ongoing (interventions implemented as appropriate)  Pt somnolent throughout shift with a few episodes of wakefulness. Spouse has remained at bedside. Electrolytes replaced per MD orders. New PIV placed. Pt spouse refusing clotrimazole troches, stating they burn pts mouth, informed of use for severe case of thrush/mouth sores. Safety precautions maintained. IVF infusing. New wound care orders noted for Q other day dsg changes with triad to stage 3 buttocks.

## 2019-07-26 NOTE — PT/OT/SLP PROGRESS
Physical Therapy Treatment    Patient Name:  Jose Martin Hutchins   MRN:  [id number]    Recommendations:     Discharge Recommendations:  rehabilitation facility   Discharge Equipment Recommendations: wheelchair, manual, hospital bed, lift device   Barriers to discharge: None    Assessment:     Jose Martin Hutchins is a 78 y.o. male admitted with a medical diagnosis of Intraperitoneal hemorrhage.  He presents with the following impairments/functional limitations:  weakness, impaired endurance, gait instability, impaired functional mobilty, impaired self care skills, impaired balance, decreased coordination, decreased upper extremity function, decreased lower extremity function, decreased safety awareness, decreased ROM, impaired skin, impaired coordination, impaired cardiopulmonary response to activity Patient with orthostatic HOTN sitting EOB; discussed with nurse about placing pt in chair position intermittently throughout the day to assist with BP accommodation; increased strength/movement in BLEs; also noted that pt was oriented to person only, had word finding problems, delayed responses; cont with POC.  Rehab Prognosis: Good; patient would benefit from acute skilled PT services to address these deficits and reach maximum level of function.    Recent Surgery: Procedure(s) (LRB):  BRONCHOSCOPY, FIBEROPTIC, Bronchoalveolar Lavage +/- biospsy (Bilateral) 4 Days Post-Op    Plan:     During this hospitalization, patient to be seen 6 x/week to address the identified rehab impairments via gait training, therapeutic activities, therapeutic exercises, neuromuscular re-education and progress toward the following goals:    · Plan of Care Expires:  07/20/19    Subjective     Pain/Comfort:  · Pain Rating 1: 0/10  · Pain Rating Post-Intervention 1: 0/10      Objective:     Communicated with nurse prior to session.  Patient found with bed alarm, telemetry, peripheral IV, SCD upon PT entry to room.     General Precautions:  Standard, aspiration, fall, respiratory   Orthopedic Precautions:N/A   Braces: N/A     Functional Mobility:  · Bed Mobility:     · Rolling Left:  minimum assistance  · Scooting: maximal assistance and to EOB  · Supine to Sit: moderate assistance  · Sit to Supine: total assistance, of 2 persons and toward HOB with drawsheet      AM-PAC 6 CLICK MOBILITY  Turning over in bed (including adjusting bedclothes, sheets and blankets)?: 3  Sitting down on and standing up from a chair with arms (e.g., wheelchair, bedside commode, etc.): 1  Moving from lying on back to sitting on the side of the bed?: 2  Moving to and from a bed to a chair (including a wheelchair)?: 1  Need to walk in hospital room?: 1  Climbing 3-5 steps with a railing?: 1  Basic Mobility Total Score: 9       Therapeutic Activities and Exercises:   Patient performed BLE/UE AROM ex with CGA x10 reps in all ranges of motion-APs, heel slides, hip abd/add, hip rolls, BUE shld fl, bicep curls; moved to EOB and felt weak; supine BP 94/67 HR 10, sitting 74/58; returned to trendelenburg /80 HR 97; chair position 126/74    Patient left with bed in chair position with all lines intact, call button in reach, bed alarm on, nurse notified and wife present..    GOALS:   Multidisciplinary Problems     Physical Therapy Goals        Problem: Physical Therapy Goal    Goal Priority Disciplines Outcome Goal Variances Interventions   Physical Therapy Goal     PT, PT/OT Ongoing (interventions implemented as appropriate)     Description:  Goals to be met by: 2019     Patient will increase functional independence with mobility by performin. Supine to sit with MInimal Assistance  2. Rolling to Left and Right with Minimal Assistance.  3. Sit to stand transfer with Moderate Assistance  3. Bed to chair transfer with Moderate Assistance using Rolling Walker  4. Sitting at edge of bed x10 minutes with Minimal Assistance  3. Lower extremity exercise program x12 reps per  handout, with assistance as needed                      Time Tracking:     PT Received On: 07/26/19  PT Start Time: 1000     PT Stop Time: 1030  PT Total Time (min): 30 min with OT    Billable Minutes: Therapeutic Exercise 15 minutes    Treatment Type: Treatment  PT/PTA: PT     PTA Visit Number: 0     Isabela Tinoco, PT  07/26/2019

## 2019-07-26 NOTE — ASSESSMENT & PLAN NOTE
Mental status change on 7/25  Repeat CT Head: Neg  Repeat Chest X-ray: Neg  Repeat CT Abd: Pending read  Blood Cultures: NGTD

## 2019-07-26 NOTE — PROGRESS NOTES
[facility] Medicine  Progress Note    Patient Name: Jose Martin Hutchins  MRN: [id number]  Patient Class: IP- Inpatient   Admission Date: 7/19/2019  Length of Stay: 7 days  Attending Physician: Al Boone III, MD  Primary Care Provider: Sam Washington MD        Subjective:     Principal Problem:Intraperitoneal hemorrhage      HPI:  77 yo male with pmhx of HTN, CAD, HLD, CKD, GERD, RA, CIDP, Guillain Burre syndrome and A.fib was transferred from [facility] for IR to drain his intraperitoneal hemorrhage. He was admitted in [facility] 8 days ago. He was initially admitted for elective plasmapheresis since his CIDP was worsening. During his stay he became septic and was found to have UTI. He was treated for UTI. While he was in the general floor, he developed fever, hypotensive and became hemodynamically unstable with a drop in his h/h. He had a CT abdomen done which was positive for intraperitoneal hemorrhage that needed to be assessed by IR for possible draining and embolizing the source.   During his transfer to [facility], EMS noticed that the pt had few episodes where his eyes rolled back and his body was shaking. He was also noticed to have similar episode when he was first seen in the ICU. During those episodes his BP was noticeably low as well. Pt was awake upon verbal command. He knows his name but is not oriented to time. He denies any chest pain, sob, nausea, vomiting. He does complaint of pain in his right side of the abdomen.  He has elevated WBC, but no growth from cultures in the outside facility.            Overview/Hospital Course:  No notes on file    Interval History: Overnight, patient had another episode of confusion where he was speaking to his mother and throwing the sheets off. This morning, patient was sleeping. No fevers overnight. Patient had a hypotensive episode overnight and required IVFs.    Review of Systems   Unable to perform ROS: Mental status change     Objective:      Vital Signs (Most Recent):  Temp: 98.4 °F (36.9 °C) (07/26/19 0811)  Pulse: (!) 111 (07/26/19 0811)  Resp: 15 (07/26/19 0811)  BP: 103/61 (07/26/19 0820)  SpO2: (!) 94 % (07/26/19 0856) Vital Signs (24h Range):  Temp:  [97.4 °F (36.3 °C)-98.7 °F (37.1 °C)] 98.4 °F (36.9 °C)  Pulse:  [] 111  Resp:  [15-18] 15  SpO2:  [92 %-97 %] 94 %  BP: ()/(51-82) 103/61     Weight: 76.1 kg (167 lb 12.3 oz)  Body mass index is 24.78 kg/m².    Intake/Output Summary (Last 24 hours) at 7/26/2019 0913  Last data filed at 7/26/2019 0515  Gross per 24 hour   Intake 205 ml   Output 300 ml   Net -95 ml      Physical Exam   Cardiovascular: Normal rate, regular rhythm, normal heart sounds and intact distal pulses. Exam reveals no gallop and no friction rub.   No murmur heard.  Pulmonary/Chest: Effort normal and breath sounds normal. No stridor. No respiratory distress. He has no wheezes. He has no rales. He exhibits no tenderness.   Abdominal: Soft. Bowel sounds are normal. He exhibits no distension and no mass. There is no tenderness. There is no rebound and no guarding.   Musculoskeletal: Normal range of motion. He exhibits no edema.   Skin: Skin is warm and dry.       Significant Labs:   CBC:   Recent Labs   Lab 07/25/19  0653 07/25/19  1804 07/26/19  0626   WBC 15.76* 19.84* 15.83*   HGB 10.6* 11.2* 10.4*   HCT 32.1* 33.9* 31.5*    225 200     CMP:   Recent Labs   Lab 07/25/19  0653 07/26/19  0626   * 131*   K 3.0* 2.8*   CL 93* 95   CO2 28 27   * 136*   BUN 30* 29*   CREATININE 1.3 1.3   CALCIUM 8.9 8.6*   PROT 5.6* 5.3*   ALBUMIN 3.0* 2.8*   BILITOT 1.3* 1.1*   ALKPHOS 70 63   AST 61* 66*   ALT 40 40   ANIONGAP 10 9   EGFRNONAA 53* 53*     Magnesium:   Recent Labs   Lab 07/25/19  0653 07/26/19  0626   MG 1.7 1.5*       Significant Imaging:           Assessment/Plan:      * Intraperitoneal hemorrhage  Pt transferred from [facility] with intraperitoneal hemorrhage requesting IR consult for  embolization  CT abdomen: Acute approximately 15 x 4 cm hematoma within the region of the lesser sac with signs of active contrast extravasation.  IR consulted, currently no need for embolization at this time  Surgery consulted, signed off  Will continue to monitor H/H  H/H stable  CT Abdomen was repeated due to change in mental status. 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 Baseline 35/1.2.   Likely 2/2 medications vs intravascular volume depletion.   Will start IVF and continue to monitor.   Cr continuing to downtrend  Avoid nephrotoxic agents.       Type 2 diabetes mellitus, without long-term current use of insulin  Continue Insulin determir 12 units QHS along with moderate dose correcting scale.   POCT glucose QID  AM glucose goal < 180  Will continue to monitor.       Sepsis  Unclear etiology, concern for Pneumonia  WBC on admission 28 and patient was on pressors for hypotension  Blood cultures from outside hospital: NGTD  ID consulted: Continue Cefepime, Flagyl, Vancomycin, Doxycycline  Consulted Pulm for bronchoscopy for fungal cultures  AFB stain: Negative, Fungal cultures are still in process  7/19 Blood cultures: NGTD      Seizure-like activity  Seizure like activity noticed, likely 2/2 hypotensive episodes.  No further episodes in the hospitalization   EEG: No signs of seizure activity  2 mg Ativan IV for generalized seizures lasting > 5 min  Delirium and seizure  precautions.     Urinary retention  Patient presented to hospital with Esparza in place for urinary retention  Due to low urinary output, Lasix was started and now is discontinued  Esparza discontinued yesterday  Continue to monitor urine output    Pressure injury of right buttock, stage 3  Wound care consulted.       Paroxysmal atrial fibrillation  Patient with history of A.fib in the outside facility  Currently NSR  HR goal < 120  Will continue to monitor.     Essential hypertension  Hold home BP medications  Levophed discontinued 7/20  BP goal < 140/80    CIDP (chronic inflammatory demyelinating polyneuropathy)  Consult Neuro and appreciate rec; Neurology signed off  Continue Prednisone 15mg daily  Patient to follow up with Neuro outpatient after discharge        VTE Risk Mitigation (From admission, onward)        Ordered     IP VTE HIGH RISK PATIENT  Once      07/19/19 1452     Place JANE hose  Until discontinued      07/19/19 0910     Place sequential compression device  Until discontinued      07/19/19 0910            Mary Lou Gee, PGY-2  [facility]  07/26/2019 9:28 AM

## 2019-07-27 LAB
ALBUMIN SERPL BCP-MCNC: 2.7 G/DL (ref 3.5–5.2)
ALP SERPL-CCNC: 63 U/L (ref 55–135)
ALT SERPL W/O P-5'-P-CCNC: 36 U/L (ref 10–44)
ANION GAP SERPL CALC-SCNC: 9 MMOL/L (ref 8–16)
APTT BLDCRRT: 36.2 SEC (ref 21–32)
AST SERPL-CCNC: 62 U/L (ref 10–40)
BASOPHILS # BLD AUTO: ABNORMAL K/UL (ref 0–0.2)
BASOPHILS NFR BLD: 0 % (ref 0–1.9)
BILIRUB SERPL-MCNC: 1.1 MG/DL (ref 0.1–1)
BUN SERPL-MCNC: 27 MG/DL (ref 8–23)
CALCIUM SERPL-MCNC: 8.3 MG/DL (ref 8.7–10.5)
CHLORIDE SERPL-SCNC: 97 MMOL/L (ref 95–110)
CO2 SERPL-SCNC: 25 MMOL/L (ref 23–29)
CREAT SERPL-MCNC: 1 MG/DL (ref 0.5–1.4)
DIFFERENTIAL METHOD: ABNORMAL
EOSINOPHIL # BLD AUTO: ABNORMAL K/UL (ref 0–0.5)
EOSINOPHIL NFR BLD: 0 % (ref 0–8)
ERYTHROCYTE [DISTWIDTH] IN BLOOD BY AUTOMATED COUNT: 18.1 % (ref 11.5–14.5)
EST. GFR  (AFRICAN AMERICAN): >60 ML/MIN/1.73 M^2
EST. GFR  (NON AFRICAN AMERICAN): >60 ML/MIN/1.73 M^2
GLUCOSE SERPL-MCNC: 129 MG/DL (ref 70–110)
HCT VFR BLD AUTO: 32.8 % (ref 40–54)
HGB BLD-MCNC: 11 G/DL (ref 14–18)
INR PPP: 1.2 (ref 0.8–1.2)
LYMPHOCYTES # BLD AUTO: ABNORMAL K/UL (ref 1–4.8)
LYMPHOCYTES NFR BLD: 4 % (ref 18–48)
MAGNESIUM SERPL-MCNC: 1.6 MG/DL (ref 1.6–2.6)
MCH RBC QN AUTO: 29.5 PG (ref 27–31)
MCHC RBC AUTO-ENTMCNC: 33.5 G/DL (ref 32–36)
MCV RBC AUTO: 88 FL (ref 82–98)
METAMYELOCYTES NFR BLD MANUAL: 2 %
MONOCYTES # BLD AUTO: ABNORMAL K/UL (ref 0.3–1)
MONOCYTES NFR BLD: 2 % (ref 4–15)
MYELOCYTES NFR BLD MANUAL: 2 %
NEUTROPHILS NFR BLD: 89 % (ref 38–73)
NEUTS BAND NFR BLD MANUAL: 1 %
PHOSPHATE SERPL-MCNC: 2.5 MG/DL (ref 2.7–4.5)
PLATELET # BLD AUTO: 196 K/UL (ref 150–350)
PMV BLD AUTO: 9 FL (ref 9.2–12.9)
POCT GLUCOSE: 150 MG/DL (ref 70–110)
POCT GLUCOSE: 163 MG/DL (ref 70–110)
POCT GLUCOSE: 169 MG/DL (ref 70–110)
POCT GLUCOSE: 200 MG/DL (ref 70–110)
POTASSIUM SERPL-SCNC: 3.3 MMOL/L (ref 3.5–5.1)
PROT SERPL-MCNC: 5.3 G/DL (ref 6–8.4)
PROTHROMBIN TIME: 12.3 SEC (ref 9–12.5)
RBC # BLD AUTO: 3.73 M/UL (ref 4.6–6.2)
SODIUM SERPL-SCNC: 131 MMOL/L (ref 136–145)
WBC # BLD AUTO: 14.59 K/UL (ref 3.9–12.7)

## 2019-07-27 PROCEDURE — 83735 ASSAY OF MAGNESIUM: CPT

## 2019-07-27 PROCEDURE — 85007 BL SMEAR W/DIFF WBC COUNT: CPT

## 2019-07-27 PROCEDURE — 25000003 PHARM REV CODE 250: Performed by: STUDENT IN AN ORGANIZED HEALTH CARE EDUCATION/TRAINING PROGRAM

## 2019-07-27 PROCEDURE — 85730 THROMBOPLASTIN TIME PARTIAL: CPT

## 2019-07-27 PROCEDURE — 63600175 PHARM REV CODE 636 W HCPCS: Performed by: STUDENT IN AN ORGANIZED HEALTH CARE EDUCATION/TRAINING PROGRAM

## 2019-07-27 PROCEDURE — 11000001 HC ACUTE MED/SURG PRIVATE ROOM

## 2019-07-27 PROCEDURE — 80053 COMPREHEN METABOLIC PANEL: CPT

## 2019-07-27 PROCEDURE — 97530 THERAPEUTIC ACTIVITIES: CPT

## 2019-07-27 PROCEDURE — S0028 INJECTION, FAMOTIDINE, 20 MG: HCPCS | Performed by: STUDENT IN AN ORGANIZED HEALTH CARE EDUCATION/TRAINING PROGRAM

## 2019-07-27 PROCEDURE — 84100 ASSAY OF PHOSPHORUS: CPT

## 2019-07-27 PROCEDURE — 93005 ELECTROCARDIOGRAM TRACING: CPT

## 2019-07-27 PROCEDURE — 36415 COLL VENOUS BLD VENIPUNCTURE: CPT

## 2019-07-27 PROCEDURE — 97110 THERAPEUTIC EXERCISES: CPT

## 2019-07-27 PROCEDURE — 85610 PROTHROMBIN TIME: CPT

## 2019-07-27 PROCEDURE — 94761 N-INVAS EAR/PLS OXIMETRY MLT: CPT

## 2019-07-27 PROCEDURE — 85027 COMPLETE CBC AUTOMATED: CPT

## 2019-07-27 PROCEDURE — 63600175 PHARM REV CODE 636 W HCPCS: Performed by: FAMILY MEDICINE

## 2019-07-27 PROCEDURE — S0030 INJECTION, METRONIDAZOLE: HCPCS | Performed by: STUDENT IN AN ORGANIZED HEALTH CARE EDUCATION/TRAINING PROGRAM

## 2019-07-27 RX ORDER — MAGNESIUM SULFATE HEPTAHYDRATE 40 MG/ML
2 INJECTION, SOLUTION INTRAVENOUS ONCE
Status: COMPLETED | OUTPATIENT
Start: 2019-07-27 | End: 2019-07-27

## 2019-07-27 RX ORDER — VALACYCLOVIR HYDROCHLORIDE 500 MG/1
1000 TABLET, FILM COATED ORAL 2 TIMES DAILY
Status: DISCONTINUED | OUTPATIENT
Start: 2019-07-27 | End: 2019-07-28

## 2019-07-27 RX ADMIN — FOLIC ACID 1 MG: 1 TABLET ORAL at 08:07

## 2019-07-27 RX ADMIN — VALACYCLOVIR HYDROCHLORIDE 1000 MG: 500 TABLET, FILM COATED ORAL at 09:07

## 2019-07-27 RX ADMIN — INSULIN DETEMIR 12 UNITS: 100 INJECTION, SOLUTION SUBCUTANEOUS at 09:07

## 2019-07-27 RX ADMIN — FAMOTIDINE 20 MG: 10 INJECTION, SOLUTION INTRAVENOUS at 08:07

## 2019-07-27 RX ADMIN — MAGNESIUM SULFATE IN WATER 2 G: 40 INJECTION, SOLUTION INTRAVENOUS at 10:07

## 2019-07-27 RX ADMIN — PREDNISONE 15 MG: 10 TABLET ORAL at 08:07

## 2019-07-27 RX ADMIN — DULOXETINE 30 MG: 30 CAPSULE, DELAYED RELEASE ORAL at 08:07

## 2019-07-27 RX ADMIN — TAMSULOSIN HYDROCHLORIDE 0.4 MG: 0.4 CAPSULE ORAL at 08:07

## 2019-07-27 RX ADMIN — LACTOBACILLUS TAB 1 TABLET: TAB at 05:07

## 2019-07-27 RX ADMIN — METRONIDAZOLE 500 MG: 500 INJECTION, SOLUTION INTRAVENOUS at 05:07

## 2019-07-27 RX ADMIN — LACTOBACILLUS TAB 1 TABLET: TAB at 08:07

## 2019-07-27 RX ADMIN — CEFEPIME 2 G: 2 INJECTION, POWDER, FOR SOLUTION INTRAVENOUS at 10:07

## 2019-07-27 RX ADMIN — ACYCLOVIR SODIUM 370 MG: 500 INJECTION, SOLUTION INTRAVENOUS at 08:07

## 2019-07-27 NOTE — PT/OT/SLP PROGRESS
Physical Therapy Treatment    Patient Name:  Jose Martin Hutchins   MRN:  [id number]    Recommendations:     Discharge Recommendations:  rehabilitation facility   Discharge Equipment Recommendations: wheelchair, manual, hospital bed, lift device   Barriers to discharge: decreased functional mobility level     Assessment:     Jose Martin Hutchins is a 78 y.o. male admitted with a medical diagnosis of Intraperitoneal hemorrhage.  He presents with the following impairments/functional limitations:  weakness, impaired endurance, impaired self care skills, impaired functional mobilty, gait instability, impaired balance, impaired cognition, decreased lower extremity function, decreased upper extremity function, decreased safety awareness, impaired cardiopulmonary response to activity, impaired coordination, impaired skin Pt is progressing toward goals. Pt was able to tolerate static sit balance training up to 7 minutes before decrease in BP and  c/o fatigue ceased tx with pt returned to supine.    Rehab Prognosis: Good; patient would benefit from acute skilled PT services to address these deficits and reach maximum level of function.    Recent Surgery: Procedure(s) (LRB):  BRONCHOSCOPY, FIBEROPTIC, Bronchoalveolar Lavage +/- biospsy (Bilateral) 5 Days Post-Op    Plan:     During this hospitalization, patient to be seen 6 x/week to address the identified rehab impairments via gait training, therapeutic activities, therapeutic exercises, neuromuscular re-education and progress toward the following goals:    · Plan of Care Expires:  08/20/19    Subjective     Chief Complaint: fatigue at the end of sitting time.  Patient/Family Comments/goals: to get stronger  Pain/Comfort:  · Pain Rating 1: 0/10  · Pain Rating Post-Intervention 1: 0/10      Objective:     Communicated with RN prior to session.  Patient found HOB elevated in R side lying  with telemetry, bed alarm, peripheral IV, oxygen, SCD upon PT entry to room.     General  Precautions: Standard, aspiration, fall, respiratory   Orthopedic Precautions:N/A   Braces: N/A     Functional Mobility:  · Bed Mobility:     · Rolling Left:  minimum assistance using bed rail to assist  · Rolling Right: minimum assistance using bed rail to assist  · Supine to Sit: moderate assistance  · Sit to Supine: maximal assistance and total assistance of 1  · Balance: Static sit balance: P+/ F-      AM-PAC 6 CLICK MOBILITY  Turning over in bed (including adjusting bedclothes, sheets and blankets)?: 3  Sitting down on and standing up from a chair with arms (e.g., wheelchair, bedside commode, etc.): 1  Moving from lying on back to sitting on the side of the bed?: 2  Moving to and from a bed to a chair (including a wheelchair)?: 1  Need to walk in hospital room?: 1  Climbing 3-5 steps with a railing?: 1  Basic Mobility Total Score: 9       Therapeutic Activities and Exercises:   Performed B LE AAROM in supine x 10 reps: AP, HS, hip abd/add, SAQ needing assist for proper execution and grading of mvt  Pt's resting BP was 133/64,  bpm, pt required MODAx  1 for supine to sit and to scoot anteriorly at EOB. Initially pt's BP decreased to 97/65,  bpm but after 2 min increased to 102/71, after 5 min 122/77 and after 7 min 93/68. Pt c/o fatigue and was returned back to supine with TOTAL/MAXAx  1, MAXAx  2 to scoot up in bed   and rolling L/R with MINAx  1 for linen management. Pt's BP increased to 151/91 with RN notified of the above.    Patient left right sidelying with all lines intact, call button in reach, bed alarm on, RN notified and wife present..    GOALS:   Multidisciplinary Problems     Physical Therapy Goals        Problem: Physical Therapy Goal    Goal Priority Disciplines Outcome Goal Variances Interventions   Physical Therapy Goal     PT, PT/OT Ongoing (interventions implemented as appropriate)     Description:  Goals to be met by: 8/20/2019     Patient will increase functional independence  with mobility by performin. Supine to sit with MInimal Assistance  2. Rolling to Left and Right with Minimal Assistance.  3. Sit to stand transfer with Moderate Assistance  3. Bed to chair transfer with Moderate Assistance using Rolling Walker  4. Sitting at edge of bed x10 minutes with Minimal Assistance  3. Lower extremity exercise program x12 reps per handout, with assistance as needed                      Time Tracking:     PT Received On: 19  PT Start Time: 1149     PT Stop Time: 1221  PT Total Time (min): 32 min     Billable Minutes: Therapeutic Activity 15 and Therapeutic Exercise 17    Treatment Type: Treatment  PT/PTA: PT     PTA Visit Number: 0     Romina Alvarenga, PT  2019

## 2019-07-27 NOTE — PROGRESS NOTES
Progress Note  [facility]  Admit Date: 7/19/2019   LOS: 8 days   SUBJECTIVE:   Patient seen and examined this AM. He was much more alert this am than last night. He was able to answer questions appropriately. He denies any abd pain, HA, or rash. In fact, he was able to drink a cup of coffee this am. Remained afebrile overnight. Per ID, likely dc both cefepime and Flagyl today, started IV acyclovir (7/26), and possible start fluconazole pending AM EKG QTc results. Last EKG w/ QTc: ~555.    ROS  Neuro: Mental status improving.  Abd: Abd pain/distention continues to improve.  Resp: Denies SOB  CV: Denies CP    OBJECTIVE:   Vital Signs (Most Recent)  Temp: 97.9 °F (36.6 °C) (07/27/19 0710)  Pulse: 107 (07/27/19 0732)  Resp: 18 (07/27/19 0710)  BP: 135/81 (07/27/19 0710)  SpO2: 95 % (07/27/19 0340)    I & O (Last 24H):    Intake/Output Summary (Last 24 hours) at 7/27/2019 0832  Last data filed at 7/27/2019 0615  Gross per 24 hour   Intake 3113.75 ml   Output --   Net 3113.75 ml     Wt Readings from Last 3 Encounters:   07/27/19 78.6 kg (173 lb 4.5 oz)   07/18/19 77.3 kg (170 lb 6.7 oz)   07/19/19 77.1 kg (170 lb)       Current Diet Order   Procedures    Diet Dysphagia Pureed (IDDSI Level 4) Thin     Order Specific Question:   Fluid consistency:     Answer:   Thin        Physical Exam  Constitutional: He is AAOx2. L subclavian line in place.   Cardiovascular: exam reveals no gallop and no friction rub. No murmur heard.  Pulmonary/Chest: no acute respiratory distress. Minimum rales appreciated at the bibasilar bases.  Abdominal: Soft. Bowel sounds are present. He exhibits no rebound/guarding. No peritoneal signs. +distention  Nursing note and vitals reviewed.    Laboratory Data:  CBC  Recent Labs   Lab 07/25/19  1804 07/26/19  0626 07/27/19  0600   WBC 19.84* 15.83* 14.59*   RBC 3.81* 3.56* 3.73*   HGB 11.2* 10.4* 11.0*   HCT 33.9* 31.5* 32.8*    200 196   MCV 89 89 88   MCH 29.4 29.2 29.5   MCHC 33.0 33.0  33.5     CMP  Recent Labs   Lab 07/25/19  0653 07/26/19  0626 07/27/19  0600   CALCIUM 8.9 8.6* 8.3*   PROT 5.6* 5.3* 5.3*   * 131* 131*   K 3.0* 2.8* 3.3*   CO2 28 27 25   CL 93* 95 97   BUN 30* 29* 27*   CREATININE 1.3 1.3 1.0   ALKPHOS 70 63 63   ALT 40 40 36   AST 61* 66* 62*   BILITOT 1.3* 1.1* 1.1*     POCT-Glucose  POCT Glucose   Date Value Ref Range Status   07/27/2019 150 (H) 70 - 110 mg/dL Final   07/26/2019 206 (H) 70 - 110 mg/dL Final   07/26/2019 199 (H) 70 - 110 mg/dL Final   07/26/2019 130 (H) 70 - 110 mg/dL Final   07/26/2019 176 (H) 70 - 110 mg/dL Final   07/25/2019 226 (H) 70 - 110 mg/dL Final   07/25/2019 217 (H) 70 - 110 mg/dL Final   07/25/2019 132 (H) 70 - 110 mg/dL Final   07/25/2019 158 (H) 70 - 110 mg/dL Final   07/24/2019 258 (H) 70 - 110 mg/dL Final   07/24/2019 236 (H) 70 - 110 mg/dL Final   07/24/2019 157 (H) 70 - 110 mg/dL Final     COAGS  Recent Labs   Lab 07/25/19  0653 07/26/19  0626 07/27/19  0600   INR 1.2 1.2 1.2   APTT 34.3* 35.2* 36.2*     UA  No results for input(s): COLORU, CLARITYU, SPECGRAV, PHUR, PROTEINUA, GLUCOSEU, BLOODU, WBCU, RBCU, BACTERIA, MUCUS in the last 24 hours.    Invalid input(s):  BILIRUBINCON  MICRO  Microbiology Results (last 7 days)     Procedure Component Value Units Date/Time    Blood culture [[id number]] Collected:  07/25/19 1136    Order Status:  Completed Specimen:  Blood Updated:  07/26/19 1422     Blood Culture, Routine No Growth to date      No Growth to date    Narrative:       Please obtain from 2 diff sites    Blood culture [[id number]] Collected:  07/25/19 1136    Order Status:  Completed Specimen:  Blood Updated:  07/26/19 1422     Blood Culture, Routine No Growth to date      No Growth to date    Culture, Respiratory [[id number]] Collected:  07/23/19 1005    Order Status:  Completed Specimen:  Respiratory from BAL, JAIMIE Updated:  07/25/19 0713     Respiratory Culture No growth     Gram Stain (Respiratory) <10 epithelial cells per low power  field.     Gram Stain (Respiratory) Rare WBC's     Gram Stain (Respiratory) No organisms seen    AFB Culture & Smear [[id number]] Collected:  07/23/19 1005    Order Status:  Completed Specimen:  Respiratory from BAL, JAIMIE Updated:  07/24/19 2127     AFB Culture & Smear Culture in progress     AFB CULTURE STAIN No acid fast bacilli seen.    Blood culture [[id number]] Collected:  07/19/19 1349    Order Status:  Completed Specimen:  Blood Updated:  07/24/19 1822     Blood Culture, Routine No growth after 5 days.    Narrative:       Collection has been rescheduled by ESS at 07/19/2019 12:08 Reason:   Unable to collect  07/19/2019 13:49 left hand  Collection has been rescheduled by ESS at 07/19/2019 12:08 Reason:   Unable to collect  07/19/2019 13:49 left hand    Blood culture [[id number]] Collected:  07/19/19 1403    Order Status:  Completed Specimen:  Blood Updated:  07/24/19 1822     Blood Culture, Routine No growth after 5 days.    Narrative:       Collection has been rescheduled by ESS at 07/19/2019 12:08 Reason:   Unable to collect  Collection has been rescheduled by ESS at 07/19/2019 12:08 Reason:   Unable to collect    Fungus culture [[id number]] Collected:  07/23/19 1005    Order Status:  Completed Specimen:  Respiratory from BAL, JAIMIE Updated:  07/24/19 1045     Fungus (Mycology) Culture Culture in progress    Narrative:       Bronchial Wash    Direct AFB stain [[id number]] Collected:  07/23/19 1005    Order Status:  Completed Specimen:  Respiratory from BAL, JAIMIE Updated:  07/23/19 1708     Direct Acid Fast No acid fast bacilli seen.    Gram stain [[id number]] Collected:  07/23/19 1006    Order Status:  Canceled Specimen:  Body Fluid from Lung, JAIMIE     Fungus culture [[id number]]     Order Status:  Canceled Specimen:  Respiratory from BAL, JAIMIE     Culture, Anaerobe [[id number]]     Order Status:  Canceled Specimen:  Body Fluid from Lung, JAIMIE     Aerobic culture [[id number]]     Order Status:  Canceled Specimen:  Body  Fluid from Lung, JAIMIE     AFB Culture & Smear [[id number]]     Order Status:  Canceled Specimen:  Respiratory from BAL, JAIMIE         LIPID PANEL  No results found for: CHOL  No results found for: HDL  No results found for: LDLCALC  No results found for: TRIG  No results found for: CHOLHDL    Diagnostic Results:  Imaging in last 24 hours personally reviewed.     ASSESSMENT/PLAN:   Jose Martin Hutchins is a 78 y.o. male with evidence of an intraperitoneal hemorrhage (stable), ANDIE (resolved) and PNA (resolving).     Intraperitoneal hemorrhage  Continue to trend H/H.  Patient is s/p 2 unit prbcs for his current hospital course  Patient has h/o CAD (Hgb>8)   Repeat CT imaging 7/25 revealed stable hematoma without increase in size. Incidental pancreatic lesion noted and per Radiology recommend MRI w/ and w/o 1-2 weeks outpatient after discharge  Continue close monitoring and supportive care    ANDIE on CKD  Most likely secondary to abx  Avoid renal toxic meds  Keep Mg 2, K 4  Continue Strict I/O   Echo (7/12/19): EF 55% w/ grade 1 diastolic dysfunction  Bun/Cr today: 27/1.0  Resolved    Left Upper Lobe PNA  Continue Cefepime/Flagyl  Blood and Sputum Cxs are NGTD   ID recommendations to discontinue abx today.  Oxygen PRN to keep O2 > 88%  Continue close monitoring  Resolving     DM II  Maintain glucose 140-180  Continue SSI  HgbA1C: 7.4 (7/19)  Continue POCT checks     CIDP  Per Neurology, continue steroid at 15mg.   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 Pending AM EKG for QTc.   Continue to monitor    Sepsis  Resolved    GI ppx: Famotidine  Code: Full    Dispo:  PT/OT recs inpatient rehab. Pending placement.     Case discussed with  attending physician.     Manan Bacon Jr., D.O.  [facility], -2  [facility]

## 2019-07-27 NOTE — PLAN OF CARE
Cued into patient's room.  Permission received per patient's spouse to turn camera to view patient.  Introduced as VN for night shift that will be working with floor nurse and nursing assistant.  Patient resting comfortably in bed with eyes closed; respirations even and unlabored.  No distress noted.  Educated family on VN's role in patient care. Plan of care reviewed with family. Education per flowsheet.  Opportunity given for questions and questions answered.  Instructed to call for assistance.  Will cont to monitor.

## 2019-07-27 NOTE — PROGRESS NOTES
[facility] Progress Note     Primary Team: Dr. Pinto  Consultant Attending: Dr. Raymond  Date of Admit: 7/19/2019    Summary of history     Jose Martin Hutchins is a 78 y.o. male with a relevant history of dyslipidemia, CAD, HTN, mitral regurgitation, tricuspid regurgitation, chronic renal disease, GERD, rheumatoid arthritis, and chronic inflammatory demyelinating polyneuritis.     The patient presented to [facility] on 7/19/2019 as a direct admit to the ICU from [facility] for high level of care and interventional radiology services due to suspected retroperitoneal hematoma.     The patient was initially admitted at [facility] for elective plasmapheresis planned by an outside neurologist for his worsening CIPD. During admission, he had a fever of 102 at which time urine cultures, blood cultures, and urinalysis were done. Cultures remained negative but urinalysis was suspect for UTI which was treated with vancomycin and ceftriaxone for 7 days. Per family, they report he was improving after the two initial plasma exchange treatments but acutely decompensated and became hypotensive yesterday afternoon. At this time, hemoglobin dropped from 11 to 9 and CT chest and abdomen showed upper lobe pneumonia and large hematoma in the lesser sac.     Upon arrival to the [facility] ICU, he became unresponsive while being transferred to the bed with systolic BP in the 70's. He has remained afebrile.  Interval events     Transitioned to PO valacyclovir  MRI of abdomen obtained and official read is pending    Subjective     Patient is doing well today. He was able to tolerate his food this AM and reported decrease in discomfort and pain with eating. Patient was transitioned to PO valcyclovir. Herpatic tongue lesions are healing well. [place] of candidal growth on tongue has decreased. Patient overall reports doing well but his mentation remains slow.      Current Medications:     Infusions:        Scheduled:   clotrimazole  10 mg Oral 5x Daily    docusate sodium  100 mg Oral BID    DULoxetine  30 mg Oral Daily    famotidine (PF)  20 mg Intravenous Daily    folic acid  1 mg Oral Daily    insulin detemir U-100  12 Units Subcutaneous QHS    Lactobacillus acidoph-L.bulgar  1 tablet Oral TID WM    polyethylene glycol  17 g Oral BID    predniSONE  15 mg Oral Daily    tamsulosin  0.4 mg Oral Daily    valACYclovir  1,000 mg Oral BID        PRN:  sodium chloride, sodium chloride, acetaminophen, benzocaine, dextrose 50 % in water (D50W), dextrose 50 % in water (D50W), fentaNYL, glucagon (human recombinant), glucose, glucose, glucose, glucose, HYDROcodone-acetaminophen, insulin aspart U-100, lidocaine HCL 2%, magic mouthwash (diphenhydrAMINE 12.5 mg/5 mL 20 mL, aluminum & magnesium hydroxide-simethicone (MYLANTA) 20 mL, lidocaine HCl 2% (XYLOCAINE) 20 mL) solution, midazolam, morphine, naloxone, nitroGLYCERIN, promethazine (PHENERGAN) IVPB, ramelteon, sodium chloride 0.9%, sodium chloride 0.9%, sodium chloride 0.9%    Antibiotics and Day Number of Therapy:  Cefepime and metronidazole day #7    Objective   Last 24 Hour Vital Signs:  BP  Min: 120/73  Max: 143/83  Temp  Av.9 °F (36.6 °C)  Min: 97.7 °F (36.5 °C)  Max: 98.1 °F (36.7 °C)  Pulse  Av.8  Min: 96  Max: 116  Resp  Av.4  Min: 15  Max: 18  SpO2  Av.2 %  Min: 95 %  Max: 98 %  Weight  Av.6 kg (173 lb 4.5 oz)  Min: 78.6 kg (173 lb 4.5 oz)  Max: 78.6 kg (173 lb 4.5 oz)    Lines, drains, airway:  Left subclavian trialysis catheter in place    Physical Examination:  Examination  General: well appearing, comfortable   HEENT: normal oral mucosa, normal dentition, conjunctiva normal, pupils normal, extraocular motion normal, white patches characteristic of thrush noted on tongue, healing herpetic lesions noted on tongue  Neck: no thyromegaly, no JVD   Cardiac: no murmurs, pulse regular    Pulmonary/Chest: chest clear, no respiratory  distress   GI/Rectal: no organomegaly, no masses, non tender, normal bowel sounds, rectal exam deferred   : deferred   Msk: 3/5 strength in lower extremities and 4/5 strength in upper extremities b/l  Vascular: normal peripheral perfusion   Lymph nodes: none palpated  Skin/ Extremities: no rash, no pedal edema, no ulceration    Neurology/ Mental status: Patient exhibits confusion and slowed mentation      Lab data:  CBC:   Lab Results   Component Value Date    WBC 14.59 (H) 07/27/2019    HGB 11.0 (L) 07/27/2019     07/27/2019    MCV 88 07/27/2019    RDW 18.1 (H) 07/27/2019       Estimated Creatinine Clearance: 61.9 mL/min (based on SCr of 1 mg/dL).    Microbiology Data  Microbiology Results (last 7 days)     Procedure Component Value Units Date/Time    Blood culture [[id number]] Collected:  07/25/19 1136    Order Status:  Completed Specimen:  Blood Updated:  07/27/19 1422     Blood Culture, Routine No Growth to date      No Growth to date      No Growth to date    Narrative:       Please obtain from 2 diff sites    Blood culture [[id number]] Collected:  07/25/19 1136    Order Status:  Completed Specimen:  Blood Updated:  07/27/19 1422     Blood Culture, Routine No Growth to date      No Growth to date      No Growth to date    Culture, Respiratory [[id number]] Collected:  07/23/19 1005    Order Status:  Completed Specimen:  Respiratory from BAL, JAIMIE Updated:  07/25/19 0713     Respiratory Culture No growth     Gram Stain (Respiratory) <10 epithelial cells per low power field.     Gram Stain (Respiratory) Rare WBC's     Gram Stain (Respiratory) No organisms seen    AFB Culture & Smear [[id number]] Collected:  07/23/19 1005    Order Status:  Completed Specimen:  Respiratory from BAL, JAIMIE Updated:  07/24/19 2127     AFB Culture & Smear Culture in progress     AFB CULTURE STAIN No acid fast bacilli seen.    Blood culture [[id number]] Collected:  07/19/19 1349    Order Status:  Completed Specimen:  Blood Updated:   07/24/19 1822     Blood Culture, Routine No growth after 5 days.    Narrative:       Collection has been rescheduled by ESS at 07/19/2019 12:08 Reason:   Unable to collect  07/19/2019 13:49 left hand  Collection has been rescheduled by ESS at 07/19/2019 12:08 Reason:   Unable to collect  07/19/2019 13:49 left hand    Blood culture [[id number]] Collected:  07/19/19 1403    Order Status:  Completed Specimen:  Blood Updated:  07/24/19 1822     Blood Culture, Routine No growth after 5 days.    Narrative:       Collection has been rescheduled by ESS at 07/19/2019 12:08 Reason:   Unable to collect  Collection has been rescheduled by ESS at 07/19/2019 12:08 Reason:   Unable to collect    Fungus culture [[id number]] Collected:  07/23/19 1005    Order Status:  Completed Specimen:  Respiratory from BAL, JAIMIE Updated:  07/24/19 1045     Fungus (Mycology) Culture Culture in progress    Narrative:       Bronchial Wash    Direct AFB stain [[id number]] Collected:  07/23/19 1005    Order Status:  Completed Specimen:  Respiratory from BAL, JAIMIE Updated:  07/23/19 1708     Direct Acid Fast No acid fast bacilli seen.    Gram stain [[id number]] Collected:  07/23/19 1006    Order Status:  Canceled Specimen:  Body Fluid from Lung, JAIMIE     Fungus culture [[id number]]     Order Status:  Canceled Specimen:  Respiratory from BAL, JAIMIE     Culture, Anaerobe [[id number]]     Order Status:  Canceled Specimen:  Body Fluid from Lung, JAIMIE     Aerobic culture [[id number]]     Order Status:  Canceled Specimen:  Body Fluid from Lung, JAIMIE     AFB Culture & Smear [[id number]]     Order Status:  Canceled Specimen:  Respiratory from BAL, JAIMIE           Assessment     Jose Martin Hutchins is a 78 y.o.male with past medical history as above. Patient appears to be doing well. Oral ulcers are healing and thrush is resolving. Antibiotics will be stopped today as the patient is completing his 7 day course of treatment.       Recommendations        Left Upper Lobe  Pneumonia  - Completed Cefepime 2g IV q212h for 7 day duration  - Completed Metronidazole 500mg IV q8h for 7 day duration    Thrush  - Significant improvement noted  - Continue Clotrimazole 10 mg 5 times daily, recommend ordering a nystatin based swish and swallow solution       HSV Stomatitis  - Continue Valacyclovir 1000 BID      Thank you for this consult. We will follow.    [place]  [facility] ID Fellow

## 2019-07-27 NOTE — PLAN OF CARE
Problem: Physical Therapy Goal  Goal: Physical Therapy Goal  Goals to be met by: 2019     Patient will increase functional independence with mobility by performin. Supine to sit with MInimal Assistance  2. Rolling to Left and Right with Minimal Assistance.  3. Sit to stand transfer with Moderate Assistance  3. Bed to chair transfer with Moderate Assistance using Rolling Walker  4. Sitting at edge of bed x10 minutes with Minimal Assistance  3. Lower extremity exercise program x12 reps per handout, with assistance as needed     Outcome: Ongoing (interventions implemented as appropriate)  Pt is progressing toward goals. Pt was able to tolerate static sit balance training up to 7 minutes before decrease in BP and  c/o fatigue ceased tx with pt returned to supine.

## 2019-07-27 NOTE — PLAN OF CARE
Problem: Fall Injury Risk  Goal: Absence of Fall and Fall-Related Injury    Intervention: Identify and Manage Contributors to Fall Injury Risk  Pt continues to rest in bed with eyes closed. Easily aroused to verbal stimuli. Pt continues to remain confused but able to follow simple commands. Respirations even full and unlabored. No distress noted. Seizure precautions remain IP. Turn x6ipuxr IP. Bed alarm on at all times. Wife at bedside at all times. Call bell within reach at all times. Will continue to monitor.

## 2019-07-28 PROBLEM — D63.8 ANEMIA OF CHRONIC DISEASE: Status: RESOLVED | Noted: 2019-07-21 | Resolved: 2019-07-28

## 2019-07-28 PROBLEM — R56.9 SEIZURE-LIKE ACTIVITY: Status: RESOLVED | Noted: 2019-07-19 | Resolved: 2019-07-28

## 2019-07-28 LAB
ALBUMIN SERPL BCP-MCNC: 2.8 G/DL (ref 3.5–5.2)
ALP SERPL-CCNC: 67 U/L (ref 55–135)
ALT SERPL W/O P-5'-P-CCNC: 38 U/L (ref 10–44)
ANION GAP SERPL CALC-SCNC: 11 MMOL/L (ref 8–16)
APTT BLDCRRT: 33.4 SEC (ref 21–32)
AST SERPL-CCNC: 62 U/L (ref 10–40)
BASOPHILS # BLD AUTO: 0.02 K/UL (ref 0–0.2)
BASOPHILS NFR BLD: 0.1 % (ref 0–1.9)
BILIRUB SERPL-MCNC: 1.1 MG/DL (ref 0.1–1)
BUN SERPL-MCNC: 24 MG/DL (ref 8–23)
CALCIUM SERPL-MCNC: 8.8 MG/DL (ref 8.7–10.5)
CHLORIDE SERPL-SCNC: 96 MMOL/L (ref 95–110)
CO2 SERPL-SCNC: 25 MMOL/L (ref 23–29)
CREAT SERPL-MCNC: 1.1 MG/DL (ref 0.5–1.4)
DIFFERENTIAL METHOD: ABNORMAL
EOSINOPHIL # BLD AUTO: 0 K/UL (ref 0–0.5)
EOSINOPHIL NFR BLD: 0.1 % (ref 0–8)
ERYTHROCYTE [DISTWIDTH] IN BLOOD BY AUTOMATED COUNT: 18.4 % (ref 11.5–14.5)
EST. GFR  (AFRICAN AMERICAN): >60 ML/MIN/1.73 M^2
EST. GFR  (NON AFRICAN AMERICAN): >60 ML/MIN/1.73 M^2
GLUCOSE SERPL-MCNC: 130 MG/DL (ref 70–110)
HCT VFR BLD AUTO: 32.6 % (ref 40–54)
HGB BLD-MCNC: 10.8 G/DL (ref 14–18)
INR PPP: 1.2 (ref 0.8–1.2)
LYMPHOCYTES # BLD AUTO: 1.1 K/UL (ref 1–4.8)
LYMPHOCYTES NFR BLD: 7.1 % (ref 18–48)
MAGNESIUM SERPL-MCNC: 2 MG/DL (ref 1.6–2.6)
MCH RBC QN AUTO: 29.6 PG (ref 27–31)
MCHC RBC AUTO-ENTMCNC: 33.1 G/DL (ref 32–36)
MCV RBC AUTO: 89 FL (ref 82–98)
MONOCYTES # BLD AUTO: 0.3 K/UL (ref 0.3–1)
MONOCYTES NFR BLD: 2.2 % (ref 4–15)
NEUTROPHILS # BLD AUTO: 12.9 K/UL (ref 1.8–7.7)
NEUTROPHILS NFR BLD: 90.5 % (ref 38–73)
PHOSPHATE SERPL-MCNC: 1.9 MG/DL (ref 2.7–4.5)
PLATELET # BLD AUTO: 216 K/UL (ref 150–350)
PMV BLD AUTO: 9 FL (ref 9.2–12.9)
POCT GLUCOSE: 132 MG/DL (ref 70–110)
POCT GLUCOSE: 158 MG/DL (ref 70–110)
POCT GLUCOSE: 171 MG/DL (ref 70–110)
POCT GLUCOSE: 232 MG/DL (ref 70–110)
POTASSIUM SERPL-SCNC: 3 MMOL/L (ref 3.5–5.1)
PROT SERPL-MCNC: 5.5 G/DL (ref 6–8.4)
PROTHROMBIN TIME: 12 SEC (ref 9–12.5)
RBC # BLD AUTO: 3.65 M/UL (ref 4.6–6.2)
SODIUM SERPL-SCNC: 132 MMOL/L (ref 136–145)
WBC # BLD AUTO: 14.95 K/UL (ref 3.9–12.7)

## 2019-07-28 PROCEDURE — 36415 COLL VENOUS BLD VENIPUNCTURE: CPT

## 2019-07-28 PROCEDURE — 92526 ORAL FUNCTION THERAPY: CPT

## 2019-07-28 PROCEDURE — 85730 THROMBOPLASTIN TIME PARTIAL: CPT

## 2019-07-28 PROCEDURE — 25000003 PHARM REV CODE 250: Performed by: STUDENT IN AN ORGANIZED HEALTH CARE EDUCATION/TRAINING PROGRAM

## 2019-07-28 PROCEDURE — 83735 ASSAY OF MAGNESIUM: CPT

## 2019-07-28 PROCEDURE — 85025 COMPLETE CBC W/AUTO DIFF WBC: CPT

## 2019-07-28 PROCEDURE — 94761 N-INVAS EAR/PLS OXIMETRY MLT: CPT

## 2019-07-28 PROCEDURE — 85610 PROTHROMBIN TIME: CPT

## 2019-07-28 PROCEDURE — 84100 ASSAY OF PHOSPHORUS: CPT

## 2019-07-28 PROCEDURE — 11000001 HC ACUTE MED/SURG PRIVATE ROOM

## 2019-07-28 PROCEDURE — 63600175 PHARM REV CODE 636 W HCPCS: Performed by: STUDENT IN AN ORGANIZED HEALTH CARE EDUCATION/TRAINING PROGRAM

## 2019-07-28 PROCEDURE — 80053 COMPREHEN METABOLIC PANEL: CPT

## 2019-07-28 RX ORDER — VALACYCLOVIR HYDROCHLORIDE 500 MG/1
500 TABLET, FILM COATED ORAL 2 TIMES DAILY
Status: DISCONTINUED | OUTPATIENT
Start: 2019-07-28 | End: 2019-07-29 | Stop reason: HOSPADM

## 2019-07-28 RX ORDER — NYSTATIN 100000 [USP'U]/ML
500000 SUSPENSION ORAL
Status: DISCONTINUED | OUTPATIENT
Start: 2019-07-28 | End: 2019-07-29 | Stop reason: HOSPADM

## 2019-07-28 RX ORDER — SODIUM,POTASSIUM PHOSPHATES 280-250MG
1 POWDER IN PACKET (EA) ORAL ONCE
Status: COMPLETED | OUTPATIENT
Start: 2019-07-28 | End: 2019-07-28

## 2019-07-28 RX ORDER — PANTOPRAZOLE SODIUM 40 MG/1
40 TABLET, DELAYED RELEASE ORAL DAILY
Status: DISCONTINUED | OUTPATIENT
Start: 2019-07-28 | End: 2019-07-29 | Stop reason: HOSPADM

## 2019-07-28 RX ORDER — HEPARIN SODIUM 5000 [USP'U]/ML
5000 INJECTION, SOLUTION INTRAVENOUS; SUBCUTANEOUS EVERY 8 HOURS
Status: DISCONTINUED | OUTPATIENT
Start: 2019-07-28 | End: 2019-07-28

## 2019-07-28 RX ADMIN — PANTOPRAZOLE SODIUM 40 MG: 40 TABLET, DELAYED RELEASE ORAL at 08:07

## 2019-07-28 RX ADMIN — INSULIN ASPART 2 UNITS: 100 INJECTION, SOLUTION INTRAVENOUS; SUBCUTANEOUS at 04:07

## 2019-07-28 RX ADMIN — VALACYCLOVIR HYDROCHLORIDE 500 MG: 500 TABLET, FILM COATED ORAL at 09:07

## 2019-07-28 RX ADMIN — LACTOBACILLUS TAB 1 TABLET: TAB at 12:07

## 2019-07-28 RX ADMIN — VALACYCLOVIR HYDROCHLORIDE 1000 MG: 500 TABLET, FILM COATED ORAL at 08:07

## 2019-07-28 RX ADMIN — NYSTATIN 500000 UNITS: 100000 SUSPENSION ORAL at 04:07

## 2019-07-28 RX ADMIN — NYSTATIN 500000 UNITS: 100000 SUSPENSION ORAL at 12:07

## 2019-07-28 RX ADMIN — LACTOBACILLUS TAB 1 TABLET: TAB at 04:07

## 2019-07-28 RX ADMIN — PREDNISONE 15 MG: 10 TABLET ORAL at 08:07

## 2019-07-28 RX ADMIN — DULOXETINE 30 MG: 30 CAPSULE, DELAYED RELEASE ORAL at 08:07

## 2019-07-28 RX ADMIN — POTASSIUM PHOSPHATE, MONOBASIC AND POTASSIUM PHOSPHATE, DIBASIC 30 MMOL: 224; 236 INJECTION, SOLUTION, CONCENTRATE INTRAVENOUS at 08:07

## 2019-07-28 RX ADMIN — FOLIC ACID 1 MG: 1 TABLET ORAL at 08:07

## 2019-07-28 RX ADMIN — INSULIN DETEMIR 12 UNITS: 100 INJECTION, SOLUTION SUBCUTANEOUS at 09:07

## 2019-07-28 RX ADMIN — LACTOBACILLUS TAB 1 TABLET: TAB at 08:07

## 2019-07-28 RX ADMIN — TAMSULOSIN HYDROCHLORIDE 0.4 MG: 0.4 CAPSULE ORAL at 08:07

## 2019-07-28 RX ADMIN — POTASSIUM & SODIUM PHOSPHATES POWDER PACK 280-160-250 MG 1 PACKET: 280-160-250 PACK at 10:07

## 2019-07-28 RX ADMIN — NYSTATIN 500000 UNITS: 100000 SUSPENSION ORAL at 09:07

## 2019-07-28 NOTE — ASSESSMENT & PLAN NOTE
Tongue lesions swab positive for HSV-1  Continue Valacyclovir 1,000mg BID  Currently on Day 5/5 of treatment

## 2019-07-28 NOTE — ASSESSMENT & PLAN NOTE
Patient presented to hospital with Esparza in place for urinary retention  Esparza removed and patient is able to urinate   Resolved  Continue to monitor urine output

## 2019-07-28 NOTE — SUBJECTIVE & OBJECTIVE
Interval History: This morning, patient is doing better. Patient is complaining about mid-epigastric pain. Patient is having     Review of Systems   Constitutional: Negative for activity change, appetite change, fatigue and fever.   HENT: Negative for congestion.    Cardiovascular: Negative for chest pain, palpitations and leg swelling.   Gastrointestinal: Positive for abdominal pain and nausea (improved with [place]ne[place]). Negative for abdominal distention, blood in stool, constipation, diarrhea and vomiting.   Genitourinary: Negative for difficulty urinating, dysuria, flank pain and frequency.   Musculoskeletal: Negative for arthralgias, back pain and myalgias.   Skin: Negative for color change, rash and wound.   Neurological: Positive for light-headedness (after sitting up for 7 minutes). Negative for dizziness.   Hematological: Does not bruise/bleed easily.     Objective:     Vital Signs (Most Recent):  Temp: 97.6 °F (36.4 °C) (07/28/19 0718)  Pulse: 108 (07/28/19 0734)  Resp: 18 (07/28/19 0718)  BP: (!) 159/85 (07/28/19 0718)  SpO2: 96 % (07/27/19 2047) Vital Signs (24h Range):  Temp:  [97.5 °F (36.4 °C)-97.9 °F (36.6 °C)] 97.6 °F (36.4 °C)  Pulse:  [104-114] 108  Resp:  [18] 18  SpO2:  [95 %-98 %] 96 %  BP: (118-159)/(82-87) 159/85     Weight: 78 kg (171 lb 15.3 oz)  Body mass index is 25.39 kg/m².    Intake/Output Summary (Last 24 hours) at 7/28/2019 0741  Last data filed at 7/28/2019 0500  Gross per 24 hour   Intake 740 ml   Output 1140 ml   Net -400 ml      Physical Exam   HENT:   Oral lesions improved   Cardiovascular: Normal rate, regular rhythm, normal heart sounds and intact distal pulses. Exam reveals no gallop and no friction rub.   No murmur heard.  Pulmonary/Chest: Effort normal and breath sounds normal. No stridor. No respiratory distress. He has no wheezes. He has no rales. He exhibits no tenderness.   Abdominal: Soft. Bowel sounds are normal. He exhibits no distension and no mass. There is  tenderness (mid-epigastric tenderness). There is no rebound and no guarding.   Musculoskeletal: He exhibits no edema.   3/5 in the bilateral upper extremity  2/5 in the bilateral lower extremity   Skin: Skin is warm and dry.   Left Subclavian Central Line in place       Significant Labs:   CBC:   Recent Labs   Lab 07/27/19  0600 07/28/19  0542   WBC 14.59* 14.95*   HGB 11.0* 10.8*   HCT 32.8* 32.6*    216     CMP:   Recent Labs   Lab 07/27/19 0600 07/28/19  0542   * 132*   K 3.3* 3.0*   CL 97 96   CO2 25 25   * 130*   BUN 27* 24*   CREATININE 1.0 1.1   CALCIUM 8.3* 8.8   PROT 5.3* 5.5*   ALBUMIN 2.7* 2.8*   BILITOT 1.1* 1.1*   ALKPHOS 63 67   AST 62* 62*   ALT 36 38   ANIONGAP 9 11   EGFRNONAA >60 >60     Magnesium:   Recent Labs   Lab 07/27/19 0600 07/28/19  0542   MG 1.6 2.0       Significant Imaging:     CT Abdomen: Improvement abdominal hematoma and intra-abdominal hemoperitoneum. Improved aeration of the lungs.

## 2019-07-28 NOTE — PLAN OF CARE
Problem: Adult Inpatient Plan of Care  Goal: Plan of Care Review  Outcome: Ongoing (interventions implemented as appropriate)  Patient AA orientedx1, VSS, Blood glucose monitored. Patient denies any pain. New foam dressing placed on pressure injury. Patient free from falls. Bed alarm on. Call bell in reach. Safety maintained, will continue to monitor.     Problem: Infection  Goal: Infection Symptom Resolution  Outcome: Ongoing (interventions implemented as appropriate)       Problem: Skin Injury Risk Increased  Goal: Skin Health and Integrity  Outcome: Ongoing (interventions implemented as appropriate)       Problem: Fall Injury Risk  Goal: Absence of Fall and Fall-Related Injury  Outcome: Ongoing (interventions implemented as appropriate)

## 2019-07-28 NOTE — PLAN OF CARE
Cued into patient's room.  Permission received per patient's spouse to turn camera to view patient.  Introduced as VN for night shift that will be working with floor nurse and nursing assistant.  Apryl RN at bedside.  Educated patient on VN's role in patient care. Plan of care reviewed with patient and spouse.  Education per flowsheet.  Opportunity given for questions and questions answered.  Patient unable to name the building where he is even when given the hint of having nurses and doctors care for him.  Having difficulty finding his words.  Patient states today is [birth year]his [birth year]  Reoriented and reassured.    Denies pain, n/v, and sob.  Instructed to call for assistance.  Will cont to monitor.

## 2019-07-28 NOTE — NURSING
Patient continues to rest in bed with eyes closed. Respirations even full and unlabored. No  distress noted. Pt easily aroused to verbal stimuli. Alert to self only. Able to follow simple  commands. Turns independently. Wife remains at bedside at all times. Safety precautions enforced   at all times. SR up x's2. Bed alarm on at all times. Pt reminded not to get OOB without   assitance. Unable to assess pt's understanding. Call bell within reach. Will continue to monitor.

## 2019-07-28 NOTE — PROGRESS NOTES
[facility] Infectious Disease Progress Note     Primary Team: Family Medicine  Consultant Attending: Alessia  Date of Admit: 7/19/2019    Summary of history     Jose Martin Hutchins is a 78 y.o. male with a relevant history of dyslipidemia, CAD, HTN, mitral regurgitation, tricuspid regurgitation, chronic renal disease, GERD, rheumatoid arthritis, and chronic inflammatory demyelinating polyneuritis.     The patient presented to [facility] on 7/19/2019 as a direct admit to the ICU from [facility] for high level of care and interventional radiology services due to suspected retroperitoneal hematoma.     The patient was initially admitted at [facility] for elective plasmapheresis planned by an outside neurologist for his worsening CIPD. During admission, he had a fever of 102 at which time urine cultures, blood cultures, and urinalysis were done. Cultures remained negative but urinalysis was suspect for UTI which was treated with vancomycin and ceftriaxone for 7 days. Per family, they report he was improving after the two initial plasma exchange treatments but acutely decompensated and became hypotensive yesterday afternoon. At this time, hemoglobin dropped from 11 to 9 and CT chest and abdomen showed upper lobe pneumonia and large hematoma in the lesser sac.     Upon arrival to the [facility] ICU, he became unresponsive while being transferred to the bed with systolic BP in the 70's. He has remained afebrile.    Interval events     Stepped down from ICU to floor 7/22.  Bronchoscopy on 7/23.  Transitioned to PO valacyclovir.    Subjective     Patient reports some mild abdominal pain and pain upon swallowing food. He states that food no longer hurts in his mouth but is painful upon the act of swallowing. Mentation still slow this morning but is improving compared to prior examinations. Denies any fever, chills, nausea, vomiting, or diarrhea.    Current Medications:     Scheduled:   clotrimazole  10 mg Oral 5x  Daily    docusate sodium  100 mg Oral BID    DULoxetine  30 mg Oral Daily    folic acid  1 mg Oral Daily    insulin detemir U-100  12 Units Subcutaneous QHS    Lactobacillus acidoph-L.bulgar  1 tablet Oral TID WM    pantoprazole  40 mg Oral Daily    polyethylene glycol  17 g Oral BID    potassium phosphate IVPB  30 mmol Intravenous Once    predniSONE  15 mg Oral Daily    tamsulosin  0.4 mg Oral Daily    valACYclovir  1,000 mg Oral BID        PRN:  sodium chloride, sodium chloride, acetaminophen, benzocaine, dextrose 50 % in water (D50W), dextrose 50 % in water (D50W), fentaNYL, glucagon (human recombinant), glucose, glucose, glucose, glucose, HYDROcodone-acetaminophen, insulin aspart U-100, lidocaine HCL 2%, magic mouthwash (diphenhydrAMINE 12.5 mg/5 mL 20 mL, aluminum & magnesium hydroxide-simethicone (MYLANTA) 20 mL, lidocaine HCl 2% (XYLOCAINE) 20 mL) solution, morphine, naloxone, nitroGLYCERIN, promethazine (PHENERGAN) IVPB, ramelteon, sodium chloride 0.9%, sodium chloride 0.9%, sodium chloride 0.9%    Antibiotics and Day Number of Therapy:  Vancomycin:  -   Cefepime:  -   Metronidazole:  -   Doxycycline:  -   Valacyclovir:  - current    Objective   Last 24 Hour Vital Signs:  BP  Min: 118/83  Max: 159/85  Temp  Av.7 °F (36.5 °C)  Min: 97.5 °F (36.4 °C)  Max: 97.9 °F (36.6 °C)  Pulse  Av.4  Min: 104  Max: 114  Resp  Av  Min: 18  Max: 18  SpO2  Av.2 %  Min: 94 %  Max: 98 %  Weight  Av kg (171 lb 15.3 oz)  Min: 78 kg (171 lb 15.3 oz)  Max: 78 kg (171 lb 15.3 oz)    Lines, drains, airway:  Trialysis catheter - 7/10  Esparza Catheter -     Physical Examination:  Examination  General: Patient lying comfortably in NAD.  HEENT: normocephalic, atraumatic  - Improvement of lesions on tongue and decreased thrush noted on oropharyngeal wall and hard palate of mouth  Neck: No thyromegaly or masses   Cardiac: RRR, no murmurs appreciated, no extra heart  sounds  Pulmonary/Chest: CTAB, symmetric chest rise.  GI: Soft, mild tenderness to palpation of epigastric region, mild distention noted, normoactive bowel sounds  Extremities: no edema, clubbing, or cyanosis  Skin: dry, warm, intact. No bruising or rashes.  Neuro: Alert, oriented to person and place.    Lab data:  CBC:   Lab Results   Component Value Date    WBC 14.95 (H) 07/28/2019    HGB 10.8 (L) 07/28/2019     07/28/2019    MCV 89 07/28/2019    RDW 18.4 (H) 07/28/2019       Estimated Creatinine Clearance: 56.2 mL/min (based on SCr of 1.1 mg/dL).    Microbiology Data  Tongue lesions swab - positive for HSV1  Blood culture (7/11) - negative  Blood culture (7/19) - negative  Respiratory culture - no growth  Respiratory gram stain (7/23): rare WBC's, no organisms  Respiratory acid fast: negative  C Diff - negative    Radiology:  CT Abdomen/Pelvis (7/18)  The liver, spleen, gallbladder appear normal.  There is a small amount of perihepatic fluid.  The pancreas and adrenal glands are unremarkable.  There is no hydronephrosis.  Multiple cysts are present in the left kidney.  There is an approximately 15 x 4 cm hematoma within the lesser sac.  Within this hematoma there are areas of increased attenuation suggesting active extravasation.  There is moderate fat stranding and hemorrhage in the region of the hepatic flexure and thickening of the right lateral conal fascia.  No bowel obstruction.  No free fluid.     CT Chest (7/18)  There is no CT evidence of pulmonary thromboembolism.  No enlarged hilar or mediastinal lymph nodes are seen.  No suspicious pulmonary nodules or masses are noted.  There is an area of pneumonia in the left upper lobe and mild dependent atelectasis in both lower lobes.  There is a trace left pleural fluid collection.    Chest X-Ray (7/22)  Medical support devices project in stable radiographic position.  Cardiomediastinal silhouette is unchanged.  There are low lung volumes bilaterally  with elevation of the right hemidiaphragm.  There is continued patchy consolidation projecting over the left upper lung zone.  No definite new confluent airspace consolidation or pneumothorax appreciated.    Assessment     Jose Martin Hutchins is a 78 y.o. male with multiple comorbidities including chronic inflammatory demyelinating polyneuritis. He was transferred to [facility] for higher level of care and need for interventional radiology for a retroperitoneal hematoma.     Oral ulcers are healing and thrush is resolving. Antibiotic course complete for left upper lobe pneumonia. Will follow patients mentation.    Recommendations     Left Upper Lobe Pneumonia  - Completed Cefepime 2g IV q212h for 7 day duration  - Completed Metronidazole 500mg IV q8h for 7 day duration  - Completed Doxycycline 5 day therapy for atypical coverage    Thrush  - Discontinue Clotrimazole 10 mg 5 times daily  - Continue Nystatin swish and swallow 4 times daily    HSV Stomatitis  - Consider continuing Valacyclovir 500 PO BID due to continuing steroid therapy    Thank you for the consult. Please call with any questions.    Emma Jean Baptiste MD  [facility] Internal Medicine Resident, [facility]

## 2019-07-28 NOTE — PROGRESS NOTES
[facility] Medicine  Progress Note    Patient Name: Jose Martin Hutchins  MRN: [id number]  Patient Class: IP- Inpatient   Admission Date: 7/19/2019  Length of Stay: 9 days  Attending Physician: Al Boone III, MD  Primary Care Provider: Sam Washington MD        Subjective:     Principal Problem:Intraperitoneal hemorrhage      HPI:  79 yo male with pmhx of HTN, CAD, HLD, CKD, GERD, RA, CIDP, Guillain Burre syndrome and A.fib was transferred from [facility] for IR to drain his intraperitoneal hemorrhage. He was admitted in [facility] 8 days ago. He was initially admitted for elective plasmapheresis since his CIDP was worsening. During his stay he became septic and was found to have UTI. He was treated for UTI. While he was in the general floor, he developed fever, hypotensive and became hemodynamically unstable with a drop in his h/h. He had a CT abdomen done which was positive for intraperitoneal hemorrhage that needed to be assessed by IR for possible draining and embolizing the source.   During his transfer to [facility], EMS noticed that the pt had few episodes where his eyes rolled back and his body was shaking. He was also noticed to have similar episode when he was first seen in the ICU. During those episodes his BP was noticeably low as well. Pt was awake upon verbal command. He knows his name but is not oriented to time. He denies any chest pain, sob, nausea, vomiting. He does complaint of pain in his right side of the abdomen.  He has elevated WBC, but no growth from cultures in the outside facility.            Overview/Hospital Course:  No notes on file    Interval History: This morning, patient is doing better. Patient is complaining about mid-epigastric pain. Patient was able to drink coffee yesterday but had some nausea that was relieved with Phernegan. Patient was able to sit up on the side of the bed for several minutes. Tongue pain has improved. Patient is having bowel movements  and urinating on his own.     Review of Systems   Constitutional: Negative for activity change, appetite change, fatigue and fever.   HENT: Negative for congestion.    Cardiovascular: Negative for chest pain, palpitations and leg swelling.   Gastrointestinal: Positive for abdominal pain and nausea (improved with Phernegan). Negative for abdominal distention, blood in stool, constipation, diarrhea and vomiting.   Genitourinary: Negative for difficulty urinating, dysuria, flank pain and frequency.   Musculoskeletal: Negative for arthralgias, back pain and myalgias.   Skin: Negative for color change, rash and wound.   Neurological: Positive for light-headedness (after sitting up for 7 minutes). Negative for dizziness.   Hematological: Does not bruise/bleed easily.     Objective:     Vital Signs (Most Recent):  Temp: 97.6 °F (36.4 °C) (07/28/19 0718)  Pulse: 108 (07/28/19 0734)  Resp: 18 (07/28/19 0718)  BP: (!) 159/85 (07/28/19 0718)  SpO2: 96 % (07/27/19 2047) Vital Signs (24h Range):  Temp:  [97.5 °F (36.4 °C)-97.9 °F (36.6 °C)] 97.6 °F (36.4 °C)  Pulse:  [104-114] 108  Resp:  [18] 18  SpO2:  [95 %-98 %] 96 %  BP: (118-159)/(82-87) 159/85     Weight: 78 kg (171 lb 15.3 oz)  Body mass index is 25.39 kg/m².    Intake/Output Summary (Last 24 hours) at 7/28/2019 0741  Last data filed at 7/28/2019 0500  Gross per 24 hour   Intake 740 ml   Output 1140 ml   Net -400 ml      Physical Exam   HENT:   Oral lesions improved   Cardiovascular: Normal rate, regular rhythm, normal heart sounds and intact distal pulses. Exam reveals no gallop and no friction rub.   No murmur heard.  Pulmonary/Chest: Effort normal and breath sounds normal. No stridor. No respiratory distress. He has no wheezes. He has no rales. He exhibits no tenderness.   Abdominal: Soft. Bowel sounds are normal. He exhibits no distension and no mass. There is tenderness (mid-epigastric tenderness). There is no rebound and no guarding.   Musculoskeletal: He exhibits  no edema.   3/5 in the bilateral upper extremity  2/5 in the bilateral lower extremity   Skin: Skin is warm and dry.   Left Subclavian Central Line in place       Significant Labs:   CBC:   Recent Labs   Lab 07/27/19  0600 07/28/19  0542   WBC 14.59* 14.95*   HGB 11.0* 10.8*   HCT 32.8* 32.6*    216     CMP:   Recent Labs   Lab 07/27/19  0600 07/28/19  0542   * 132*   K 3.3* 3.0*   CL 97 96   CO2 25 25   * 130*   BUN 27* 24*   CREATININE 1.0 1.1   CALCIUM 8.3* 8.8   PROT 5.3* 5.5*   ALBUMIN 2.7* 2.8*   BILITOT 1.1* 1.1*   ALKPHOS 63 67   AST 62* 62*   ALT 36 38   ANIONGAP 9 11   EGFRNONAA >60 >60     Magnesium:   Recent Labs   Lab 07/27/19 0600 07/28/19  0542   MG 1.6 2.0       Significant Imaging:     CT Abdomen: Improvement abdominal hematoma and intra-abdominal hemoperitoneum. Improved aeration of the lungs.          Assessment/Plan:      * Intraperitoneal hemorrhage  Pt transferred from [facility] with intraperitoneal hemorrhage requesting IR consult for embolization  CT abdomen: Acute approximately 15 x 4 cm hematoma within the region of the lesser sac with signs of active contrast extravasation.  IR consulted, currently no need for embolization at this time  Surgery consulted, signed off  H/H stable  Repeat CT Abdomen shows improvement in the size of hematoma. Incidental pancreatic lesions.   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 Baseline 35/1.2.   Likely 2/2 medications vs intravascular volume depletion.   IVF started  Now resolved  Avoid nephrotoxic agents.     Type 2 diabetes mellitus, without long-term current use of insulin  Continue Insulin determir 12 units QHS  along with moderate dose correcting scale.   POCT glucose QID  AM glucose goal < 180  Will continue to monitor.     Sepsis  Unclear etiology, concern for Pneumonia  WBC on admission 28 and patient was on pressors for hypotension  Blood cultures from outside hospital: NGTD  ID consulted, and appreciate recs: Completed antibiotics on 7/27  Pulm consulted for bronch and completed  7/19 Blood cultures: NG x Final  7/23 Fungal cultures: NGTD  7/25 Blood cultures: NGTD  Now resolved    Urinary retention  Patient presented to hospital with Esparza in place for urinary retention  Esparza removed and patient is able to urinate   Resolved  Continue to monitor urine output    Pressure injury of right buttock, stage 3  Wound care consulted and appreciate recs    Paroxysmal atrial fibrillation  Patient with history of A.fib in the outside facility  Currently NSR  HR goal < 120  Will continue to monitor.     Essential hypertension  Hold home BP medications  Levophed discontinued 7/20  BP goal < 140/80        CIDP (chronic inflammatory demyelinating polyneuropathy)  Consult Neuro and appreciate rec; Neurology signed off  Continue Prednisone 15mg daily  Patient to follow up with Neuro outpatient after discharge        VTE Risk Mitigation (From admission, onward)        Ordered     IP VTE HIGH RISK PATIENT  Once      07/19/19 1452     Place JANE hose  Until discontinued      07/19/19 0910     Place sequential compression device  Until discontinued      07/19/19 0910          Mary Lou Gee, PGY-2  [facility]  07/28/2019 8:09 AM

## 2019-07-28 NOTE — PLAN OF CARE
Problem: Adult Inpatient Plan of Care  Goal: Plan of Care Review  Outcome: Ongoing (interventions implemented as appropriate)  Pt seen this AM for ongoing swallow assessment. ST spoke with OLIMPIA Gonzalez who reported that PO diet was upgraded to regular consistency today  2/2 pt refusing to eat pureed items. Pt consumed 2 tsp bites peaches fed by clinician, 3 tsp bites green beans Latvian-cut fed by clinician, and approximately 2 oz thin water via straw sips without demonstrating overt s/s of aspiration. Pt complained of odynophagia on 2 out of 5 bites, stated odynophagia was relieved with sip of water. Recommend 1) mechanical soft 2) thin liquids; 3) feeding assist; 4) HOB raised during and following PO intake. These recommendations were discussed with patient, patient's wife, patient's daughter, and OLIMPIA Gonzalez. All verbalized understanding. Pt needs reinforcement 2/2 cognitive impairments.

## 2019-07-28 NOTE — PT/OT/SLP PROGRESS
"Speech Language Pathology Treatment    Patient Name:  Jose Martin Hutchins   MRN:  [id number]  Admitting Diagnosis: Intraperitoneal hemorrhage    Recommendations:                 General Recommendations:  Dysphagia therapy  Diet recommendations:  Mechanical soft, Liquid Diet Level: Thin   Aspiration Precautions: 1 bite/sip at a time, Assistance with meals, Eliminate distractions, Feed only when awake/alert, HOB to 90 degrees, Meds whole 1 at a time and Standard aspiration precautions   General Precautions: Standard, aspiration, fall, respiratory  Communication strategies:  none    Subjective     ST spoke with OLIMPIA Gonzalez prior to visit. RN stated that pt's diet was changed to regular consistency today 2/2 patient refusing to eat pureed food. Pt seen bedside with patient's wife and daughter present this session. Patient awake and alert, oriented to self only. Pt verbalized concern about clothing, often repeating that he wanted to change into shirt and jeans. ST and pt's wife often re-directed patient to participate in swallowing trials.     Patient goals: "to get a change of clothes"    Pain/Comfort:  · Pain Rating 1: 0/10    Objective:     Has the patient been evaluated by SLP for swallowing?   Yes  Keep patient NPO? No   Current Respiratory Status: room air      ST provided ongoing swallow assessment this visit.     Assessment:     Jose Martin Hutchins is a 78 y.o. male with an SLP diagnosis of Dysphagia.  He presents with moderate dysphagia c/b decreased closure around utensil, prolonged mastication of mech soft, slow AP transport of mech soft, delayed swallow initiation of mech soft. Pt initially declined mech soft presentation of peaches from ST, but did agree to take 2 tsp bites. Coated tongue noted. Wife stated coating is less severed today. Patient also agreed to take bites of green beans brought by daughter. Pt consumed 3 tsps fed by clinician, then declined further bites. Pt complained of odynophagia 2x--  " Once after 2nd bite of peaches and once after 3rd bite of green beans. ST encouraged pt to take sips of water and pt reported pain decreased after sips of water. Decreased closure/prolonged mastication/delayed swallow initiation noted on mech soft, particularly when pt was distracted by daughter talking to him. ST verbally cued patient to continue chewing and to swallow 2X during trials and pt did follow directions to do so. No overt s/s of aspiration were noted during or following trials of mech soft or sips of thin. Recommendations: 1) mech soft/thin; 2) feeding assist; 3) feed when awake/alert, limit distractions and verbal cues to chew/swallow as needed. ST discussed these recommendations with patient, wife, daughter, and RN Lisa. All verbalized understanding. Pt requires reinforcement 2/2 impaired cognition.      Goals:   Multidisciplinary Problems     SLP Goals        Problem: SLP Goal    Goal Priority Disciplines Outcome   SLP Goal     SLP Ongoing (interventions implemented as appropriate)   Description:  Short Term Goals:  1. Pt will participate in BSS to determine least restrictive diet.--ongoing  2. Pt will tolerate clear liquids PO diet with no overt s/s of aspiration. --MET 7/21  3. Pt will safely consume full liquid diet w/ thin liquids w/ no overt s/s of aspiration.--MET 7/24  4. Pt will consume Puree/Thin liquid diet without any audible s/s of aspiration.  5. Rec: ENT consult and dietician consult to ensure added nutritional support.                        Per ENT notes on 7/24/19--Pt diagnosed with oropharyngeal candidiasis.  Per Resident notes on 7/28/19--pharyngitis 2/2 herpes simplex virus    Plan:     · Patient to be seen:  3 x/week   · Plan of Care expires:  08/19/19  · Plan of Care reviewed with:  patient, spouse, daughter   · SLP Follow-Up:  Yes       Discharge recommendations:  other (see comments)(TBD)   Barriers to Discharge:  None    Time Tracking:     SLP Treatment Date:    07/28/19  Speech Start Time:  1120  Speech Stop Time:  1140     Speech Total Time (min):  20 min    Billable Minutes: Treatment Swallowing Dysfunction 20    Laura Silverman CCC-SLP  07/28/2019

## 2019-07-28 NOTE — PLAN OF CARE
Problem: Fall Injury Risk  Goal: Absence of Fall and Fall-Related Injury  Outcome: Ongoing (interventions implemented as appropriate)  Patient continues to rest in bed with eyes closed. Respirations even full and unlabored. No  distress noted. Pt easily aroused to verbal stimuli. Alert to self only. Able to follow simple  commands. Turns independently. Wife remains at bedside at all times. Safety precautions enforced   at all times. SR up x's2. Bed alarm on at all times. Pt reminded not to get OOB without   assitance. Unable to assess pt's understanding. Call bell within reach. Will continue to monitor.  Intervention: Identify and Manage Contributors to Fall Injury Risk  Patient continues to rest in bed with eyes closed. Respirations even full and unlabored. No  distress noted. Pt easily aroused to verbal stimuli. Alert to self only. Able to follow simple  commands. Turns independently. Wife remains at bedside at all times. Safety precautions enforced   at all times. SR up x's2. Bed alarm on at all times. Pt reminded not to get OOB without   assitance. Unable to assess pt's understanding. Call bell within reach. Will continue to monitor.

## 2019-07-28 NOTE — ASSESSMENT & PLAN NOTE
BUN/Cr upon admission: 54/2.8. Baseline 35/1.2.   Likely 2/2 medications vs intravascular volume depletion.   IVF started  Now resolved  Avoid nephrotoxic agents.

## 2019-07-29 VITALS
WEIGHT: 168.19 LBS | HEART RATE: 113 BPM | BODY MASS INDEX: 24.91 KG/M2 | OXYGEN SATURATION: 95 % | SYSTOLIC BLOOD PRESSURE: 139 MMHG | HEIGHT: 69 IN | RESPIRATION RATE: 20 BRPM | TEMPERATURE: 98 F | DIASTOLIC BLOOD PRESSURE: 76 MMHG

## 2019-07-29 PROBLEM — R94.31 PROLONGED Q-T INTERVAL ON ECG: Status: ACTIVE | Noted: 2019-07-29

## 2019-07-29 LAB
ALBUMIN SERPL BCP-MCNC: 3.1 G/DL (ref 3.5–5.2)
ALP SERPL-CCNC: 70 U/L (ref 55–135)
ALT SERPL W/O P-5'-P-CCNC: 42 U/L (ref 10–44)
ANION GAP SERPL CALC-SCNC: 11 MMOL/L (ref 8–16)
APTT BLDCRRT: 31 SEC (ref 21–32)
AST SERPL-CCNC: 70 U/L (ref 10–40)
BASOPHILS # BLD AUTO: 0.01 K/UL (ref 0–0.2)
BASOPHILS NFR BLD: 0.1 % (ref 0–1.9)
BILIRUB SERPL-MCNC: 1.3 MG/DL (ref 0.1–1)
BUN SERPL-MCNC: 19 MG/DL (ref 8–23)
CALCIUM SERPL-MCNC: 9.1 MG/DL (ref 8.7–10.5)
CHLORIDE SERPL-SCNC: 95 MMOL/L (ref 95–110)
CO2 SERPL-SCNC: 26 MMOL/L (ref 23–29)
CREAT SERPL-MCNC: 0.9 MG/DL (ref 0.5–1.4)
DIFFERENTIAL METHOD: ABNORMAL
EOSINOPHIL # BLD AUTO: 0.1 K/UL (ref 0–0.5)
EOSINOPHIL NFR BLD: 0.3 % (ref 0–8)
ERYTHROCYTE [DISTWIDTH] IN BLOOD BY AUTOMATED COUNT: 18.5 % (ref 11.5–14.5)
EST. GFR  (AFRICAN AMERICAN): >60 ML/MIN/1.73 M^2
EST. GFR  (NON AFRICAN AMERICAN): >60 ML/MIN/1.73 M^2
GLUCOSE SERPL-MCNC: 66 MG/DL (ref 70–110)
HCT VFR BLD AUTO: 35.4 % (ref 40–54)
HGB BLD-MCNC: 11.7 G/DL (ref 14–18)
INR PPP: 1.1 (ref 0.8–1.2)
LYMPHOCYTES # BLD AUTO: 1 K/UL (ref 1–4.8)
LYMPHOCYTES NFR BLD: 7.1 % (ref 18–48)
MAGNESIUM SERPL-MCNC: 1.6 MG/DL (ref 1.6–2.6)
MCH RBC QN AUTO: 29.5 PG (ref 27–31)
MCHC RBC AUTO-ENTMCNC: 33.1 G/DL (ref 32–36)
MCV RBC AUTO: 89 FL (ref 82–98)
MONOCYTES # BLD AUTO: 0.4 K/UL (ref 0.3–1)
MONOCYTES NFR BLD: 2.6 % (ref 4–15)
NEUTROPHILS # BLD AUTO: 12.7 K/UL (ref 1.8–7.7)
NEUTROPHILS NFR BLD: 89.9 % (ref 38–73)
PHOSPHATE SERPL-MCNC: 2.1 MG/DL (ref 2.7–4.5)
PLATELET # BLD AUTO: 228 K/UL (ref 150–350)
PMV BLD AUTO: 8.7 FL (ref 9.2–12.9)
POCT GLUCOSE: 141 MG/DL (ref 70–110)
POCT GLUCOSE: 166 MG/DL (ref 70–110)
POCT GLUCOSE: 48 MG/DL (ref 70–110)
POCT GLUCOSE: 71 MG/DL (ref 70–110)
POCT GLUCOSE: 98 MG/DL (ref 70–110)
POTASSIUM SERPL-SCNC: 3.2 MMOL/L (ref 3.5–5.1)
PROT SERPL-MCNC: 6.2 G/DL (ref 6–8.4)
PROTHROMBIN TIME: 11.6 SEC (ref 9–12.5)
RBC # BLD AUTO: 3.97 M/UL (ref 4.6–6.2)
SODIUM SERPL-SCNC: 132 MMOL/L (ref 136–145)
WBC # BLD AUTO: 14.58 K/UL (ref 3.9–12.7)

## 2019-07-29 PROCEDURE — 85610 PROTHROMBIN TIME: CPT

## 2019-07-29 PROCEDURE — 25000003 PHARM REV CODE 250: Performed by: STUDENT IN AN ORGANIZED HEALTH CARE EDUCATION/TRAINING PROGRAM

## 2019-07-29 PROCEDURE — 83735 ASSAY OF MAGNESIUM: CPT

## 2019-07-29 PROCEDURE — 36415 COLL VENOUS BLD VENIPUNCTURE: CPT

## 2019-07-29 PROCEDURE — 85730 THROMBOPLASTIN TIME PARTIAL: CPT

## 2019-07-29 PROCEDURE — 85025 COMPLETE CBC W/AUTO DIFF WBC: CPT

## 2019-07-29 PROCEDURE — 63600175 PHARM REV CODE 636 W HCPCS: Performed by: STUDENT IN AN ORGANIZED HEALTH CARE EDUCATION/TRAINING PROGRAM

## 2019-07-29 PROCEDURE — 93010 ELECTROCARDIOGRAM REPORT: CPT | Mod: ,,, | Performed by: INTERNAL MEDICINE

## 2019-07-29 PROCEDURE — 84100 ASSAY OF PHOSPHORUS: CPT

## 2019-07-29 PROCEDURE — 94761 N-INVAS EAR/PLS OXIMETRY MLT: CPT

## 2019-07-29 PROCEDURE — 93005 ELECTROCARDIOGRAM TRACING: CPT

## 2019-07-29 PROCEDURE — 80053 COMPREHEN METABOLIC PANEL: CPT

## 2019-07-29 PROCEDURE — 93010 EKG 12-LEAD: ICD-10-PCS | Mod: ,,, | Performed by: INTERNAL MEDICINE

## 2019-07-29 RX ORDER — VALACYCLOVIR HYDROCHLORIDE 500 MG/1
500 TABLET, FILM COATED ORAL 2 TIMES DAILY
Qty: 18 TABLET | Refills: 0
Start: 2019-07-29 | End: 2019-08-07

## 2019-07-29 RX ORDER — PREDNISONE 10 MG/1
15 TABLET ORAL DAILY
Qty: 63 TABLET | Refills: 0
Start: 2019-07-29 | End: 2019-09-09

## 2019-07-29 RX ORDER — NYSTATIN 100000 [USP'U]/ML
500000 SUSPENSION ORAL
Qty: 200 ML | Refills: 0
Start: 2019-07-29 | End: 2019-08-08

## 2019-07-29 RX ADMIN — NYSTATIN 500000 UNITS: 100000 SUSPENSION ORAL at 11:07

## 2019-07-29 RX ADMIN — LACTOBACILLUS TAB 1 TABLET: TAB at 11:07

## 2019-07-29 RX ADMIN — FOLIC ACID 1 MG: 1 TABLET ORAL at 08:07

## 2019-07-29 RX ADMIN — TAMSULOSIN HYDROCHLORIDE 0.4 MG: 0.4 CAPSULE ORAL at 08:07

## 2019-07-29 RX ADMIN — DULOXETINE 30 MG: 30 CAPSULE, DELAYED RELEASE ORAL at 08:07

## 2019-07-29 RX ADMIN — VALACYCLOVIR HYDROCHLORIDE 500 MG: 500 TABLET, FILM COATED ORAL at 08:07

## 2019-07-29 RX ADMIN — POTASSIUM PHOSPHATE, MONOBASIC AND POTASSIUM PHOSPHATE, DIBASIC 20 MMOL: 224; 236 INJECTION, SOLUTION, CONCENTRATE INTRAVENOUS at 09:07

## 2019-07-29 RX ADMIN — PANTOPRAZOLE SODIUM 40 MG: 40 TABLET, DELAYED RELEASE ORAL at 08:07

## 2019-07-29 RX ADMIN — NYSTATIN 500000 UNITS: 100000 SUSPENSION ORAL at 08:07

## 2019-07-29 RX ADMIN — LACTOBACILLUS TAB 1 TABLET: TAB at 08:07

## 2019-07-29 RX ADMIN — PREDNISONE 15 MG: 10 TABLET ORAL at 08:07

## 2019-07-29 NOTE — PLAN OF CARE
Per patient's nurse, pt has to receive potassium phosphate infusion prior to transferring to [facility]; infusion to be completed in between 2pm-3pm. TN informed Niharika GAN from [facility] of this; per Niharika GAN, she has to talk with  regarding pt still admitting today at a later time.

## 2019-07-29 NOTE — ASSESSMENT & PLAN NOTE
QTc prolonged max at 610  After discontinuation of antibiotics, QTc today is 508  Due prolongation, will not be able to start fluconazole

## 2019-07-29 NOTE — DISCHARGE SUMMARY
[facility] Hospital Medicine  Discharge Summary      Patient Name: Jose Martin Hutchins  MRN: [id number]  Admission Date: 7/19/2019  Hospital Length of Stay: 10 days  Discharge Date and Time: 7/29/2019  5:35 PM  Attending Physician: No att. providers found   Discharging Provider: Mary Lou Gee MD  Primary Care Provider: Sam Washington MD      HPI:   77 yo male with pmhx of HTN, CAD, HLD, CKD, GERD, RA, CIDP, Guillain Burre syndrome and A.fib was transferred from [facility] for IR to drain his intraperitoneal hemorrhage. He was admitted in [facility] 8 days ago. He was initially admitted for elective plasmapheresis since his CIDP was worsening. During his stay he became septic and was found to have UTI. He was treated for UTI. While he was in the general floor, he developed fever, hypotensive and became hemodynamically unstable with a drop in his h/h. He had a CT abdomen done which was positive for intraperitoneal hemorrhage that needed to be assessed by IR for possible draining and embolizing the source.   During his transfer to [facility], EMS noticed that the pt had few episodes where his eyes rolled back and his body was shaking. He was also noticed to have similar episode when he was first seen in the ICU. During those episodes his BP was noticeably low as well. Pt was awake upon verbal command. He knows his name but is not oriented to time. He denies any chest pain, sob, nausea, vomiting. He does complaint of pain in his right side of the abdomen.  He has elevated WBC, but no growth from cultures in the outside facility.            Procedure(s) (LRB):  BRONCHOSCOPY, FIBEROPTIC, Bronchoalveolar Lavage +/- biospsy (Bilateral)      Hospital Course:   Patient was admitted into the ICU. IR was consulted for embolization but after reviewing scans decided against embolization due to stable H/H. General Surgery was consulted and recommendation was for surgery if patient remained unstable. As patient  improved, General Surgery signed off. Levophed was discontinued on 7/20 and patient's blood pressure remained within normal limits.    Neurology recommended decreased Prednisone from 20mg to 15mg and signed off. ID was consulted and recommended Vancomycin, Cefepime, Flagyl and Doxycycline. ID was concern for fungal cause of pneumonia and recommended Pulm consult for a bronchoscopy. Bronchoscopy was performed and sent for fungal cultures.     Patient's vitals remained stable and was transferred out of the ICU and on the floor. Patient complained about tongue pain and was evaluated by speech therapy. Speech therapy recommended ENT consult. ENT came to performed a scope which showed thick plaques. 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 Patient was started on Valacyclovir.     Patient worked with PT and OT who recommended Inpatient Rehab. Patient had an episode of confusion on 7/25 which family reports was similar to prior episodes. Repeat CT Head and CT Abdomen did not show any acute abnormalities.Patient showed improvement in his mental status and was oriented to person. Patient and family was comfortable with discharge.     Patient was stable for discharge. Patient and family were comfortable with discharge. Patient completed his course of antibiotics. Patient was discharged to complete a course of 2 weeks of Valacyclovir with instructions to evaluate oral lesions to determine if Valacyclovir needs to be extended. Patient will also continue Nystatin swish and swallow due to increase in QTc prolongation and not being able to initiate Fluconazole.     Patient was transferred to inpatient rehab. Patient has follow up with PCP and Neurology.              Consults:   Consults (From admission, onward)        Status Ordering Provider     Inpatient consult to ENT  Once     Provider:  Maddie Borja MD    Acknowledged MARGARET IBARRA     Inpatient consult to General Surgery  Once     Provider:   Sony Aranda MD    Completed MARGARET IBARRA     Inpatient consult to Infectious Diseases  Once     Provider:  (Not yet assigned)    Completed MARGARET IBARRA     Inpatient consult to Interventional Radiology  Once     Provider:  (Not yet assigned)    Completed LUIS JOHNSTON     Inpatient consult to Pulmonology  Once     Provider:  Марина Phillips MD    Completed LUZ MARIA JONES     Inpatient consult to Registered Dietitian/Nutritionist  Once     Provider:  (Not yet assigned)    Completed MARGARET IBARRA          No new Assessment & Plan notes have been filed under this hospital service since the last note was generated.  Service: Hospital Medicine    Final Active Diagnoses:    Diagnosis Date Noted POA    PRINCIPAL PROBLEM:  Intraperitoneal hemorrhage [K66.1]  Unknown    Prolonged Q-T interval on ECG [R94.31] 07/29/2019 Unknown    Pharyngitis due to herpes simplex virus (HSV) [B00.2] 07/26/2019 Unknown    Somnolence [R40.0] 07/26/2019 Unknown    Electrolyte abnormality [E87.8] 07/26/2019 Unknown    Oropharyngeal candidiasis [B37.0] 07/24/2019 Yes    Glossitis [K14.0] 07/24/2019 Yes    Mild malnutrition [E44.1] 07/24/2019 Unknown    Lung infiltrate on CT [R91.8]  Unknown    Pneumonia of left upper lobe  [J18.1]  Yes    Sepsis [A41.9] 07/19/2019 Unknown    Type 2 diabetes mellitus, without long-term current use of insulin [E11.9] 07/19/2019 Unknown    ANDIE (acute kidney injury) [N17.9] 07/19/2019 Unknown    Pressure injury of right buttock, stage 3 [L89.313] 07/12/2019 Yes    Urinary retention [R33.9] 07/12/2019 Yes    CIDP (chronic inflammatory demyelinating polyneuropathy) [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    Specialty:  Neurology  Why:  8:35 am -- this is the soonest available apt at this time --you are on a wait list for a sooner apt  --office will call:  Ronaldo Verduzco (daughter) [phone]  Contact information:  [address] [id number] [phone]             [facility]    Specialty:  Rehabilitation  Why:  Rehab               Patient Instructions:      MRI Abdomen W WO Contrast   Standing Status: Future Standing Exp. Date: 07/27/20     Order Specific Question Answer Comments   Does the patient have a pacemaker or a defibrilator? No    Does the patient have a cerebral aneurysm or surgical clip, pump, nerve or brain stimulator, middle or inner ear prosthesis, or other metal implant or  been injured by a metal object(i.e. bullet, bb, shrapnel)? No    Is the patient claustrophobic? No    Will the patient require sedation? No    Does the patient have any of the following conditions? Diabetes, History of Renal Disease or Hypertension requiring medical therapy? Yes    May the Radiologist modify the order per protocol to meet the clinical needs of the patient? Yes    Is this part of a Research Study? No    Does the patient have on a skin patch for medication with aluminized backing? No      Diet diabetic     Diet Cardiac     Notify your health care provider if you experience any of the following:  temperature >100.4     Notify your health care provider if you experience any of the following:  persistent nausea and vomiting or diarrhea     Notify your health care provider if you experience any of the following:  severe uncontrolled pain     Notify your health care provider if you experience any of the following:  redness, tenderness, or signs of infection (pain, swelling,  redness, odor or green/yellow discharge around incision site)     Notify your health care provider if you experience any of the following:  difficulty breathing or increased cough     Notify your health care provider if you experience any of the following:  severe persistent headache     Notify your health care provider if you experience any of the following:  worsening rash     Notify your health care provider if you experience any of the following:  persistent dizziness, light-headedness, or visual disturbances     Notify your health care provider if you experience any of the following:  increased confusion or weakness     Activity as tolerated       Significant Diagnostic Studies: Labs:   CMP   Recent Labs   Lab 07/28/19  0542 07/29/19  0546   * 132*   K 3.0* 3.2*   CL 96 95   CO2 25 26   * 66*   BUN 24* 19   CREATININE 1.1 0.9   CALCIUM 8.8 9.1   PROT 5.5* 6.2   ALBUMIN 2.8* 3.1*   BILITOT 1.1* 1.3*   ALKPHOS 67 70   AST 62* 70*   ALT 38 42   ANIONGAP 11 11   ESTGFRAFRICA >60 >60   EGFRNONAA >60 >60    and CBC   Recent Labs   Lab 07/28/19  0542 07/29/19  0546   WBC 14.95* 14.58*   HGB 10.8* 11.7*   HCT 32.6* 35.4*    228       Pending Diagnostic Studies:     Procedure Component Value Units Date/Time    VANCOMYCIN, TROUGH before 4th dose [[id number]] Collected:  07/23/19 1630    Order Status:  Sent Lab Status:  No result     Specimen:  Blood          Medications:  Reconciled Home Medications:      Medication List      START taking these medications    insulin detemir U-100 100 unit/mL (3 mL) Inpn pen  Commonly known as:  LEVEMIR FLEXTOUCH  Inject 8 Units into the skin every evening.     nystatin 100,000 unit/mL suspension  Commonly known as:  MYCOSTATIN  Take 5 mLs (500,000 Units total) by mouth 4 (four) times daily with meals and nightly. for 10 days     predniSONE 10 MG tablet  Commonly known as:  DELTASONE  Take 1.5 tablets (15 mg total) by mouth once daily.     valACYclovir 500 MG  tablet  Commonly known as:  VALTREX  Take 1 tablet (500 mg total) by mouth 2 (two) times daily. for 9 days        CONTINUE taking these medications    aspirin 81 MG EC tablet  Commonly known as:  ECOTRIN  Take 81 mg by mouth once daily.     dicyclomine 10 MG capsule  Commonly known as:  BENTYL  Take 10 mg by mouth 3 (three) times daily as needed.     diphenoxylate-atropine 2.5-0.025 mg 2.5-0.025 mg per tablet  Commonly known as:  LOMOTIL  Take 1 tablet by mouth 4 (four) times daily as needed for Diarrhea.     DULoxetine 30 MG capsule  Commonly known as:  CYMBALTA  Take 30 mg by mouth once daily.     folic acid 1 MG tablet  Commonly known as:  FOLVITE  Take 1 mg by mouth once daily.     gabapentin 600 MG tablet  Commonly known as:  NEURONTIN  Take 600 mg by mouth 3 (three) times daily.     hydroCHLOROthiazide 25 MG tablet  Commonly known as:  HYDRODIURIL  Take 1 tablet (25 mg total) by mouth once daily. for 6 days     Lactobacillus rhamnosus GG 10 billion cell capsule  Commonly known as:  CULTURELLE  Take 1 capsule by mouth once daily.     metoprolol succinate 25 MG 24 hr tablet  Commonly known as:  TOPROL-XL  Take 1 tablet (25 mg total) by mouth once daily.     nitroGLYCERIN 0.4 MG SL tablet  Commonly known as:  NITROSTAT  Place 0.4 mg under the tongue every 5 (five) minutes as needed for Chest pain.     omega-3 acid ethyl esters 1 gram capsule  Commonly known as:  LOVAZA  Take 2 g by mouth 2 (two) times daily.     oxybutynin 5 MG Tab  Commonly known as:  DITROPAN  Take 5 mg by mouth 2 (two) times daily.     pantoprazole 40 MG tablet  Commonly known as:  PROTONIX  Take 1 tablet (40 mg total) by mouth once daily.     tamsulosin 0.4 mg Cap  Commonly known as:  FLOMAX  Take 0.4 mg by mouth once daily.     vitamin D 1000 units Tab  Commonly known as:  VITAMIN D3  Take 1,000 Units by mouth once daily.        STOP taking these medications    dextrose 5 % SolP 250 mL with norepinephrine 1 mg/mL Soln 4 mg     fluconazole  100 MG tablet  Commonly known as:  DIFLUCAN     PIPERACILLIN-TAZOBACTAM 3.375G/50ML D5W (READY TO MIX)            Indwelling Lines/Drains at time of discharge:   Lines/Drains/Airways     Pressure Ulcer                 Pressure Injury 07/10/19 1044 Right medial Buttocks Stage 3 19 days                Time spent on the discharge of patient: 35 minutes  Patient was seen and examined on the date of discharge and determined to be suitable for discharge.         Mary Lou Gee MD  Department of Hospital Medicine  [facility]

## 2019-07-29 NOTE — PROGRESS NOTES
[facility] Infectious Disease Progress Note     Primary Team: Family Medicine  Consultant Attending: Alessia  Date of Admit: 7/19/2019    Summary of history     Jose Martin Hutchins is a 78 y.o. male with a relevant history of dyslipidemia, CAD, HTN, mitral regurgitation, tricuspid regurgitation, chronic renal disease, GERD, rheumatoid arthritis, and chronic inflammatory demyelinating polyneuritis.     The patient presented to [facility] on 7/19/2019 as a direct admit to the ICU from [facility] for high level of care and interventional radiology services due to suspected retroperitoneal hematoma.     The patient was initially admitted at [facility] for elective plasmapheresis planned by an outside neurologist for his worsening CIPD. During admission, he had a fever of 102 at which time urine cultures, blood cultures, and urinalysis were done. Cultures remained negative but urinalysis was suspect for UTI which was treated with vancomycin and ceftriaxone for 7 days. Per family, they report he was improving after the two initial plasma exchange treatments but acutely decompensated and became hypotensive yesterday afternoon. At this time, hemoglobin dropped from 11 to 9 and CT chest and abdomen showed upper lobe pneumonia and large hematoma in the lesser sac.     Upon arrival to the [facility] ICU, he became unresponsive while being transferred to the bed with systolic BP in the 70's. He has remained afebrile.    Interval events     Stepped down from ICU to floor 7/22.  Bronchoscopy on 7/23.  Transitioned to PO valacyclovir.    Subjective     Patient reports no complaints overnight. Denies any fever, nausea, vomiting, or pain/discomfort. Still remains with slow mentation.     Current Medications:     Scheduled:   docusate sodium  100 mg Oral BID    DULoxetine  30 mg Oral Daily    folic acid  1 mg Oral Daily    insulin detemir U-100  12 Units Subcutaneous QHS    Lactobacillus acidoph-L.bulgar  1 tablet Oral  TID WM    nystatin  500,000 Units Oral QID (WM & HS)    pantoprazole  40 mg Oral Daily    polyethylene glycol  17 g Oral BID    potassium phosphate IVPB  20 mmol Intravenous Once    predniSONE  15 mg Oral Daily    tamsulosin  0.4 mg Oral Daily    valACYclovir  500 mg Oral BID        PRN:  sodium chloride, sodium chloride, acetaminophen, benzocaine, dextrose 50 % in water (D50W), dextrose 50 % in water (D50W), fentaNYL, glucagon (human recombinant), glucose, glucose, glucose, glucose, HYDROcodone-acetaminophen, insulin aspart U-100, lidocaine HCL 2%, morphine, naloxone, nitroGLYCERIN, promethazine (PHENERGAN) IVPB, ramelteon, sodium chloride 0.9%, sodium chloride 0.9%, sodium chloride 0.9%    Antibiotics and Day Number of Therapy:  Vancomycin:  -   Cefepime:  -   Metronidazole:  -   Doxycycline:  -   Valacyclovir:  - current    Objective   Last 24 Hour Vital Signs:  BP  Min: 121/71  Max: 158/90  Temp  Av.9 °F (36.6 °C)  Min: 97.4 °F (36.3 °C)  Max: 98.3 °F (36.8 °C)  Pulse  Av.5  Min: 99  Max: 115  Resp  Av.4  Min: 16  Max: 18  SpO2  Av %  Min: 95 %  Max: 95 %  Weight  Av.3 kg (168 lb 3.4 oz)  Min: 76.3 kg (168 lb 3.4 oz)  Max: 76.3 kg (168 lb 3.4 oz)    Lines, drains, airway:  Trialysis catheter - 7/10  Esparza Catheter -     Physical Examination:  Examination  General: Patient lying comfortably in NAD.  HEENT: normocephalic, atraumatic  - Improvement of lesions on tongue and decreased thrush noted on oropharyngeal wall and hard palate of mouth  Neck: No thyromegaly or masses   Cardiac: RRR, no murmurs appreciated, no extra heart sounds  Pulmonary/Chest: CTAB, symmetric chest rise.  GI: Soft, mild tenderness to palpation of epigastric region, mild distention noted, normoactive bowel sounds  Extremities: no edema, clubbing, or cyanosis  Skin: dry, warm, intact. No bruising or rashes.  Neuro: Alert, oriented to person.    Lab data:  CBC:   Lab  Results   Component Value Date    WBC 14.58 (H) 07/29/2019    HGB 11.7 (L) 07/29/2019     07/29/2019    MCV 89 07/29/2019    RDW 18.5 (H) 07/29/2019       Estimated Creatinine Clearance: 68.7 mL/min (based on SCr of 0.9 mg/dL).    Microbiology Data  Tongue lesions swab - positive for HSV1  Blood culture (7/11) - negative  Blood culture (7/19) - negative  Respiratory culture - no growth  Respiratory gram stain (7/23): rare WBC's, no organisms  Respiratory acid fast: negative  C Diff - negative    Radiology:  CT Abdomen/Pelvis (7/18)  The liver, spleen, gallbladder appear normal.  There is a small amount of perihepatic fluid.  The pancreas and adrenal glands are unremarkable.  There is no hydronephrosis.  Multiple cysts are present in the left kidney.  There is an approximately 15 x 4 cm hematoma within the lesser sac.  Within this hematoma there are areas of increased attenuation suggesting active extravasation.  There is moderate fat stranding and hemorrhage in the region of the hepatic flexure and thickening of the right lateral conal fascia.  No bowel obstruction.  No free fluid.     CT Chest (7/18)  There is no CT evidence of pulmonary thromboembolism.  No enlarged hilar or mediastinal lymph nodes are seen.  No suspicious pulmonary nodules or masses are noted.  There is an area of pneumonia in the left upper lobe and mild dependent atelectasis in both lower lobes.  There is a trace left pleural fluid collection.    Chest X-Ray (7/22)  Medical support devices project in stable radiographic position.  Cardiomediastinal silhouette is unchanged.  There are low lung volumes bilaterally with elevation of the right hemidiaphragm.  There is continued patchy consolidation projecting over the left upper lung zone.  No definite new confluent airspace consolidation or pneumothorax appreciated.    Assessment     Jose Martin Hutchins is a 78 y.o. male with multiple comorbidities including chronic inflammatory  demyelinating polyneuritis. He was transferred to [facility] for higher level of care and need for interventional radiology for a retroperitoneal hematoma.     Oral ulcers are healing and thrush is resolving. Antibiotic course complete for left upper lobe pneumonia.     Recommendations     Thrush  - Continue Nystatin swish and swallow 4 times daily    HSV Stomatitis  - Continue Valacyclovir 500 PO BID due to continuing steroid therapy and patients immunosuppression    Left Upper Lobe Pneumonia  - Completed Cefepime 2g IV q212h for 7 day duration  - Completed Metronidazole 500mg IV q8h for 7 day duration  - Completed Doxycycline 5 day therapy for atypical coverage    Thank you for the consult. Please call with any questions.    Emma Jean Baptiste MD  [facility] Internal Medicine Resident, -I

## 2019-07-29 NOTE — PLAN OF CARE
Discharge information packet given to patient's nurse Jessica..       07/29/19 1532   Final Note   Assessment Type Final Discharge Note   Anticipated Discharge Disposition Rehab  ([facility])   What phone number can be called within the next 1-3 days to see how you are doing after discharge? [phone]   Hospital Follow Up  Appt(s) scheduled? No   Discharge plans and expectations educations in teach back method with documentation complete? Yes   Right Care Referral Info   Post Acute Recommendation IRF   Justice Galvez RN-BC  Transitional Navigator  [phone]

## 2019-07-29 NOTE — ASSESSMENT & PLAN NOTE
Continue Insulin determir 12 units QHS along with moderate dose correcting scale.   POCT glucose QID  AM glucose goal < 180  AM glucose was 48. Will reduce Detemir  Will continue to monitor.

## 2019-07-29 NOTE — CARE UPDATE
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) [B00.2]  Unknown    Somnolence [R40.0]  Unknown    Electrolyte abnormality [E87.8]  Unknown    Oropharyngeal candidiasis [B37.0]  Yes    Glossitis [K14.0]  Yes    Mild malnutrition [E44.1]  Unknown    Lung infiltrate on CT [R91.8]  Unknown    Pneumonia of left upper lobe  [J18.1]  Yes    Sepsis [A41.9]  Unknown    Type 2 diabetes mellitus, without long-term current use of insulin [E11.9]  Unknown    ANDIE (acute kidney injury) [N17.9]  Unknown    Pressure injury of right buttock, stage 3 [L89.313]  Yes    Urinary retention [R33.9]  Yes    CIDP (chronic inflammatory demyelinating polyneuropathy) [G61.81]  Unknown    Paroxysmal atrial fibrillation [I48.0]  Yes     Chronic    Essential hypertension [I10]  Yes     Chronic      Resolved Hospital Problems    Diagnosis Date Resolved POA    Anemia of chronic disease [D63.8] 07/28/2019 Unknown    Seizure-like activity [R56.9] 07/28/2019 Unknown       Allergies:  Review of patient's allergies indicates:   Allergen Reactions    Sulfa (sulfonamide antibiotics) Hives    Ramucirumab Palpitations       Diet: cardiac diet and diabetic diet: 2200 calorie    Activities: Activity as tolerated     MISCELLANEOUS CARE:  Routine Skin for Bedridden Patients: Apply moisture barrier cream to all skin folds and wet areas in perineal area daily and after baths and all bowel movements. and Diabetes Care:   SN to perform and educate  Diabetic management with blood glucose monitoring: and Fingerstick blood sugar a.m. and p.m.    WOUND CARE ORDERS  Yes: Pressure Ulcer(s) Stage III:  Location: Sacral    Consult ET nurse        Apply the following to wound:   Wound gel: daily (frequency)    Medications: Review discharge medications with patient and family and provide education.      Current Discharge Medication List      START taking these medications    Details   insulin detemir U-100 (LEVEMIR FLEXTOUCH) 100 unit/mL (3 mL) SubQ InPn pen Inject 8 Units into the skin every evening.  Qty: 2.4 mL, Refills: 11      nystatin (MYCOSTATIN) 100,000 unit/mL suspension Take 5 mLs (500,000 Units total) by mouth 4 (four) times daily with meals and nightly. for 10 days  Qty: 200 mL, Refills: 0      predniSONE (DELTASONE) 10 MG tablet Take 1.5 tablets (15 mg total) by mouth once daily.  Qty: 63 tablet, Refills: 0      valACYclovir (VALTREX) 500 MG tablet Take 1 tablet (500 mg total) by mouth 2 (two) times daily. for 9 days  Qty: 18 tablet, Refills: 0         CONTINUE these medications which have NOT CHANGED    Details   aspirin (ECOTRIN) 81 MG EC tablet Take 81 mg by mouth once daily.      dicyclomine (BENTYL) 10 MG capsule Take 10 mg by mouth 3 (three) times daily as needed.      diphenoxylate-atropine 2.5-0.025 mg (LOMOTIL) 2.5-0.025 mg per tablet Take 1 tablet by mouth 4 (four) times daily as needed for Diarrhea.      DULoxetine (CYMBALTA) 30 MG capsule Take 30 mg by mouth once daily.      folic acid (FOLVITE) 1 MG tablet Take 1 mg by mouth once daily.      gabapentin (NEURONTIN) 600 MG tablet Take 600 mg by mouth 3 (three) times daily.      hydroCHLOROthiazide (HYDRODIURIL) 25 MG tablet Take 1 tablet (25 mg total) by mouth once daily. for 6 days  Qty: 6 tablet, Refills: 0      Lactobacillus rhamnosus GG (CULTURELLE) 10 billion cell capsule Take 1 capsule by mouth once daily.      metoprolol succinate (TOPROL-XL) 25 MG 24 hr tablet Take 1 tablet (25 mg total)  by mouth once daily.  Qty: 30 tablet, Refills: 11      nitroGLYCERIN (NITROSTAT) 0.4 MG SL tablet Place 0.4 mg under the tongue every 5 (five) minutes as needed for Chest pain.      omega-3 acid ethyl esters (LOVAZA) 1 gram capsule Take 2 g by mouth 2 (two) times daily.      oxybutynin (DITROPAN) 5 MG Tab Take 5 mg by mouth 2 (two) times daily.      pantoprazole (PROTONIX) 40 MG tablet Take 1 tablet (40 mg total) by mouth once daily.  Qty: 30 tablet, Refills: 11      tamsulosin (FLOMAX) 0.4 mg Cap Take 0.4 mg by mouth once daily.      vitamin D (VITAMIN D3) 1000 units Tab Take 1,000 Units by mouth once daily.         STOP taking these medications       dextrose 5 % SolP 250 mL with norepinephrine 1 mg/mL Soln 4 mg Comments:   Reason for Stopping:         fluconazole (DIFLUCAN) 100 MG tablet Comments:   Reason for Stopping:         piperacillin sodium/tazobactam (PIPERACILLIN-TAZOBACTAM 3.375G/50ML D5W, READY TO MIX,) Comments:   Reason for Stopping:                    _________________________________  Mary Lou Gee MD  07/29/2019

## 2019-07-29 NOTE — PLAN OF CARE
Problem: Fall Injury Risk  Goal: Absence of Fall and Fall-Related Injury  Outcome: Ongoing (interventions implemented as appropriate)   Pt continues to rest in bed with eyes closed. Respirations even full and unlabored. Pt had an   uneventful night. Pt easily aroused to verbal stimuli. Orientated to self only. Pt reorientated   to place time and situation. No distress noted nor any c/o pain. Spouse at the bedside at all times.  Turn [id number] IP. Safety precautions enforced. Bed alarm on at all times. SR up x's2. Call bell within  reach. Will continue to montior.

## 2019-07-29 NOTE — PLAN OF CARE
TN spoke with Niharika GAN at [facility] stating pt is still OK to transfer today after pt's infusion completes, nurse to call report to [phone]. TN to call Niharika GAN when ambulance transportation is set up.

## 2019-07-29 NOTE — PLAN OF CARE
TN spoke at with Niharika MALLOY from [facility] regarding pt's discharge; per Niharika, she received d/c orders and will call TN back once she reviews orders.

## 2019-07-29 NOTE — NURSING
Patient will be discharged to [facility].  Report given to Tere of [facility].  Wife is at bedside and updates given.  Diaper changed.  Mepilex intact to sacral for pressure wounds and on arms for skin tears.  Trace edema noted to bilateral lower extremities.  Awaiting for ambulance for  to transfer patient to [facility].

## 2019-07-29 NOTE — PLAN OF CARE
Ambulance transportation running late to to  patient;  time scheduled for 5:45pm. TN spoke with nurse Wilkinson at [facility] stating pt can still admit today per .

## 2019-07-29 NOTE — PLAN OF CARE
TN spoke with pt and pt's wife regarding anticipated ambulance  time of 3:20pm ; TN also informed Niharika GAN with [facility].

## 2019-07-29 NOTE — PLAN OF CARE
Progress notes reviewed. Rounds completed. Introduced self as VN for this shift. Educated pt on VN's role in pt care. Educated pt about VTE and fall precautions.  Opportunity given for pt's questions. No questions or concerns expressed at this time. Seizure precautions in place.     07/29/19 0954   Type of Frequent Check   Type Other (see comments)  (VN round)   Safety/Activity   Patient Rounds visualized patient   Safety Promotion/Fall Prevention Fall Risk reviewed with patient/family;instructed to call staff for mobility   Safety Precautions seizure precautions maintained   Positioning   Body Position supine   Head of Bed (HOB) HOB at 20-30 degrees   Pain/Comfort/Sleep   Pain Rating: Rest 0 - no pain   RASS (De Jesus Agitation-Sedation Scale) 0-->alert and calm

## 2019-07-29 NOTE — PLAN OF CARE
TN rounded on pt and pt's wife, pt's wife in agreement with discharge plan of pt discharging to [facility] today after Trialysis line is removed by surgeon and potassium phosphate infusion completes.        07/29/19 1307   Discharge Reassessment   Assessment Type Discharge Planning Reassessment   Provided patient/caregiver education on the expected discharge date and the discharge plan Yes   Do you have any problems affording any of your prescribed medications? No   Discharge Plan A Rehab   Discharge Plan B Rehab   DME Needed Upon Discharge  none   Anticipated Discharge Disposition Rehab

## 2019-07-29 NOTE — PLAN OF CARE
TN sent Transfer orders via [facility] to [facility], TN left VM for Niharika MALLOY [facility] admission's coordinator [phone] regarding patient's discharge today.       07/29/19 1032   Post-Acute Status   Post-Acute Authorization Placement   Post-Acute Placement Status Additional Clinical Requested

## 2019-07-29 NOTE — NURSING
Dr. Gee notified of Nurse is not able to discontinue Trialysis line.    Patient is resting at this time and receive Potassium Phosphate.  Wife is at bedside.  Will continue to monitor.

## 2019-07-29 NOTE — PROGRESS NOTES
[facility]  Progress Note    Patient Name: Jose Martin Hutchins  MRN: [id number]  Patient Class: IP- Inpatient   Admission Date: 7/19/2019  Length of Stay: 10 days  Attending Physician: Al Boone III, MD  Primary Care Provider: Sam Washington MD        Subjective:     Principal Problem:Intraperitoneal hemorrhage      HPI:  79 yo male with pmhx of HTN, CAD, HLD, CKD, GERD, RA, CIDP, Guillain Burre syndrome and A.fib was transferred from [facility] for IR to drain his intraperitoneal hemorrhage. He was admitted in [facility] 8 days ago. He was initially admitted for elective plasmapheresis since his CIDP was worsening. During his stay he became septic and was found to have UTI. He was treated for UTI. While he was in the general floor, he developed fever, hypotensive and became hemodynamically unstable with a drop in his h/h. He had a CT abdomen done which was positive for intraperitoneal hemorrhage that needed to be assessed by IR for possible draining and embolizing the source.   During his transfer to [facility], EMS noticed that the pt had few episodes where his eyes rolled back and his body was shaking. He was also noticed to have similar episode when he was first seen in the ICU. During those episodes his BP was noticeably low as well. Pt was awake upon verbal command. He knows his name but is not oriented to time. He denies any chest pain, sob, nausea, vomiting. He does complaint of pain in his right side of the abdomen.  He has elevated WBC, but no growth from cultures in the outside facility.            Overview/Hospital Course:  No notes on file    Interval History: Per wife at bedside, patient was doing well earlier today. During exam, patient was tired but able to answer questions. Patient continues to have tongue pain. Family is ready to return to inpatient rehab. Tolerating diet with no nausea or vomiting but does need encouragement.     Review of Systems   Constitutional:  Negative for activity change, appetite change, fatigue and fever.   HENT: Negative for congestion.    Cardiovascular: Negative for chest pain, palpitations and leg swelling.   Gastrointestinal: Negative for abdominal distention, abdominal pain, blood in stool, constipation, diarrhea, nausea and vomiting.   Genitourinary: Negative for difficulty urinating, dysuria, flank pain and frequency.   Musculoskeletal: Negative for arthralgias, back pain and myalgias.   Skin: Negative for color change, rash and wound.   Neurological: Negative for dizziness and light-headedness.   Hematological: Does not bruise/bleed easily.     Objective:     Vital Signs (Most Recent):  Temp: 98.2 °F (36.8 °C) (07/29/19 0804)  Pulse: (!) 111 (07/29/19 0804)  Resp: 18 (07/29/19 0804)  BP: 139/85 (07/29/19 0804)  SpO2: 95 % (07/29/19 0757) Vital Signs (24h Range):  Temp:  [97.4 °F (36.3 °C)-98.3 °F (36.8 °C)] 98.2 °F (36.8 °C)  Pulse:  [] 111  Resp:  [16-18] 18  SpO2:  [95 %] 95 %  BP: (121-158)/(71-90) 139/85     Weight: 76.3 kg (168 lb 3.4 oz)  Body mass index is 24.84 kg/m².    Intake/Output Summary (Last 24 hours) at 7/29/2019 0912  Last data filed at 7/29/2019 0531  Gross per 24 hour   Intake 1065 ml   Output 160 ml   Net 905 ml      Physical Exam   Constitutional: He appears well-nourished. No distress.   HENT:   Oral lesions improved   Cardiovascular: Normal rate, regular rhythm, normal heart sounds and intact distal pulses. Exam reveals no gallop and no friction rub.   No murmur heard.  Pulmonary/Chest: Effort normal and breath sounds normal. No stridor. No respiratory distress. He has no wheezes. He has no rales. He exhibits no tenderness.   Abdominal: Soft. Bowel sounds are normal. He exhibits no distension and no mass. There is no tenderness. There is no rebound and no guarding.   Musculoskeletal: He exhibits no edema.   3/5 in the bilateral upper extremity  2/5 in the bilateral lower extremity   Skin: Skin is warm and dry. He  is not diaphoretic.   Left Subclavian Central Line in place   Nursing note and vitals reviewed.      Significant Labs:   CBC:   Recent Labs   Lab 07/28/19  0542 07/29/19  0546   WBC 14.95* 14.58*   HGB 10.8* 11.7*   HCT 32.6* 35.4*    228     CMP:   Recent Labs   Lab 07/28/19  0542 07/29/19  0546   * 132*   K 3.0* 3.2*   CL 96 95   CO2 25 26   * 66*   BUN 24* 19   CREATININE 1.1 0.9   CALCIUM 8.8 9.1   PROT 5.5* 6.2   ALBUMIN 2.8* 3.1*   BILITOT 1.1* 1.3*   ALKPHOS 67 70   AST 62* 70*   ALT 38 42   ANIONGAP 11 11   EGFRNONAA >60 >60     Magnesium:   Recent Labs   Lab 07/28/19  0542 07/29/19  0546   MG 2.0 1.6       Significant Imaging: I have reviewed all pertinent imaging results/findings within the past 24 hours.      Assessment/Plan:        * Intraperitoneal hemorrhage  Pt transferred from [facility] with intraperitoneal hemorrhage requesting IR consult for embolization  CT abdomen: Acute approximately 15 x 4 cm hematoma within the region of the lesser sac with signs of active contrast extravasation.  IR consulted, currently no need for embolization at this time  Surgery consulted, signed off  H/H stable  Repeat CT Abdomen shows improvement in the size of hematoma. Incidental pancreatic lesions.   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 Baseline 35/1.2.    Likely 2/2 medications vs intravascular volume depletion.   IVF started  Now resolved  Avoid nephrotoxic agents.       Type 2 diabetes mellitus, without long-term current use of insulin  Continue Insulin determir 12 units QHS along with moderate dose correcting scale.   POCT glucose QID  AM glucose goal < 180  AM glucose was 48.   Will transition patient back to oral medications as patient is returning to inpatient rehab  Will continue to monitor.       Sepsis  Unclear etiology, concern for Pneumonia  WBC on admission 28 and patient was on pressors for hypotension  Blood cultures from outside hospital: NGTD  ID consulted, and appreciate recs: Completed antibiotics on 7/27  Pulm consulted for bronch and completed  7/19 Blood cultures: NG x Final  7/23 Fungal cultures: NGTD  7/25 Blood cultures: NGTD      Urinary retention  Patient presented to hospital with Esparza in place for urinary retention  Esparza removed and patient is able to urinate   Resolved  Continue to monitor urine output        Pressure injury of right buttock, stage 3  Wound care consulted and appreciate recs      Paroxysmal atrial fibrillation  Patient with history of A.fib in the outside facility  Currently NSR  HR goal < 120  Will continue to monitor.     Essential hypertension  Hold home BP medications  Levophed discontinued 7/20  BP goal < 140/80        CIDP (chronic inflammatory demyelinating polyneuropathy)  Consult Neuro and appreciate rec; Neurology signed off  Continue Prednisone 15mg daily  Patient to follow up with Neuro outpatient after discharge        VTE Risk Mitigation (From admission, onward)        Ordered     IP VTE HIGH RISK PATIENT  Once      07/19/19 1452     Place JANE hose  Until discontinued      07/19/19 0910     Place sequential compression device  Until discontinued      07/19/19 0910          Dispo: Discharge to inpatient rehab    Mary Lou Gee PGY-2  [facility]  07/29/2019 9:31 AM

## 2019-07-29 NOTE — HOSPITAL COURSE
Patient was admitted into the ICU. IR was consulted for embolization but after reviewing scans decided against embolization due to stable H/H. General Surgery was consulted and recommendation was for surgery if patient remained unstable. As patient improved, General Surgery signed off. Levophed was discontinued on 7/20 and patient's blood pressure remained within normal limits.    Neurology recommended decreased Prednisone from 20mg to 15mg and signed off. ID was consulted and recommended Vancomycin, Cefepime, Flagyl and Doxycycline. ID was concern for fungal cause of pneumonia and recommended Pulm consult for a bronchoscopy. Bronchoscopy was performed and sent for fungal cultures.     Patient's vitals remained stable and was transferred out of the ICU and on the floor. Patient complained about tongue pain and was evaluated by speech therapy. Speech therapy recommended ENT consult. ENT came to performed a scope which showed thick plaques. 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 Patient was started on Valacyclovir.     Patient worked with PT and OT who recommended Inpatient Rehab. Patient had an episode of confusion on 7/25 which family reports was similar to prior episodes. Repeat CT Head and CT Abdomen did not show any acute abnormalities.Patient showed improvement in his mental status and was oriented to person. Patient and family was comfortable with discharge.     Patient was stable for discharge. Patient and family were comfortable with discharge. Patient completed his course of antibiotics. Patient was discharged to complete a course of 2 weeks of Valacyclovir with instructions to evaluate oral lesions to determine if Valacyclovir needs to be extended. Patient will also continue Nystatin swish and swallow due to increase in QTc prolongation and not being able to initiate Fluconazole.     Patient was transferred to inpatient rehab. Patient has follow up with PCP and Neurology.

## 2019-07-29 NOTE — SUBJECTIVE & OBJECTIVE
Interval History: Per wife at bedside, patient was doing well earlier today. During exam, patient was tired but able to answer questions. Patient continues to have tongue pain. Family is ready to return to inpatient rehab. Tolerating diet with no nausea or vomiting but does need encouragement.     Review of Systems   Constitutional: Negative for activity change, appetite change, fatigue and fever.   HENT: Negative for congestion.    Cardiovascular: Negative for chest pain, palpitations and leg swelling.   Gastrointestinal: Negative for abdominal distention, abdominal pain, blood in stool, constipation, diarrhea, nausea and vomiting.   Genitourinary: Negative for difficulty urinating, dysuria, flank pain and frequency.   Musculoskeletal: Negative for arthralgias, back pain and myalgias.   Skin: Negative for color change, rash and wound.   Neurological: Negative for dizziness and light-headedness.   Hematological: Does not bruise/bleed easily.     Objective:     Vital Signs (Most Recent):  Temp: 98.2 °F (36.8 °C) (07/29/19 0804)  Pulse: (!) 111 (07/29/19 0804)  Resp: 18 (07/29/19 0804)  BP: 139/85 (07/29/19 0804)  SpO2: 95 % (07/29/19 0757) Vital Signs (24h Range):  Temp:  [97.4 °F (36.3 °C)-98.3 °F (36.8 °C)] 98.2 °F (36.8 °C)  Pulse:  [] 111  Resp:  [16-18] 18  SpO2:  [95 %] 95 %  BP: (121-158)/(71-90) 139/85     Weight: 76.3 kg (168 lb 3.4 oz)  Body mass index is 24.84 kg/m².    Intake/Output Summary (Last 24 hours) at 7/29/2019 0912  Last data filed at 7/29/2019 0531  Gross per 24 hour   Intake 1065 ml   Output 160 ml   Net 905 ml      Physical Exam   Constitutional: He appears well-nourished. No distress.   HENT:   Oral lesions improved   Cardiovascular: Normal rate, regular rhythm, normal heart sounds and intact distal pulses. Exam reveals no gallop and no friction rub.   No murmur heard.  Pulmonary/Chest: Effort normal and breath sounds normal. No stridor. No respiratory distress. He has no wheezes. He  has no rales. He exhibits no tenderness.   Abdominal: Soft. Bowel sounds are normal. He exhibits no distension and no mass. There is no tenderness. There is no rebound and no guarding.   Musculoskeletal: He exhibits no edema.   3/5 in the bilateral upper extremity  2/5 in the bilateral lower extremity   Skin: Skin is warm and dry. He is not diaphoretic.   Left Subclavian Central Line in place   Nursing note and vitals reviewed.      Significant Labs:   CBC:   Recent Labs   Lab 07/28/19  0542 07/29/19  0546   WBC 14.95* 14.58*   HGB 10.8* 11.7*   HCT 32.6* 35.4*    228     CMP:   Recent Labs   Lab 07/28/19 0542 07/29/19  0546   * 132*   K 3.0* 3.2*   CL 96 95   CO2 25 26   * 66*   BUN 24* 19   CREATININE 1.1 0.9   CALCIUM 8.8 9.1   PROT 5.5* 6.2   ALBUMIN 2.8* 3.1*   BILITOT 1.1* 1.3*   ALKPHOS 67 70   AST 62* 70*   ALT 38 42   ANIONGAP 11 11   EGFRNONAA >60 >60     Magnesium:   Recent Labs   Lab 07/28/19  0542 07/29/19  0546   MG 2.0 1.6       Significant Imaging: I have reviewed all pertinent imaging results/findings within the past 24 hours.

## 2019-07-29 NOTE — NURSING
Pt continues to rest in bed with eyes closed. Respirations even full and unlabored. Pt had an   uneventful night. Pt easily aroused to verbal stimuli. Orientated to self only. Pt reorientated   to place time and situation. No distress noted nor any c/o pain. Spouse at the bedside at all times.  Turn [id number] IP. Safety precautions enforced. Bed alarm on at all times. SR up x's2. Call bell within  reach. Will continue to montior.

## 2019-07-30 LAB
BACTERIA BLD CULT: NORMAL
BACTERIA BLD CULT: NORMAL

## 2019-07-30 NOTE — PHYSICIAN QUERY
PT Name: Jose Martin Hutchins  MR #: [id number]    Physician Query Form - Nutrition Clarification     CDS/: Iva Jackson               Contact information: [email or web address]      This form is a permanent document in the medical record.     Query Date: July 30, 2019    By submitting this query, we are merely seeking further clarification of documentation.. Please utilize your independent clinical judgment when addressing the question(s) below.    The Medical record contains the following:   Indicators  Supporting Clinical Findings Location in Medical Record   x % of Estimated Energy Intake over a time frame from p.o., TF, or TPN Energy Intake: <50% of estimated energy requirement for few months Nutrition note 7/24    x Weight Status over a time frame Weight Loss: 18% x 3 months  Nutrition note 7/24    x Subcutaneous Fat and/or Muscle Loss Body Fat Depletion: mild depletion of orbitals, triceps and thoracic and lumbar region   Muscle Mass Depletion: mild depletion of lower extremities  Nutrition note 7/24     Fluid Accumulation or Edema      Reduced  Strength     x Wt / BMI / Usual Body Weight Weight: 77.5 kg (170 lb 13.7 oz)  BMI (Calculated): 25.3 Nutrition note 7/24     Delayed Wound Healing / Failure to Thrive     x Acute or Chronic Illness Diagnosis: (intraperitoneall hemorrhage)  Relevant Medical History: afib, HTN, CAD, GERD, appendectomy, hernia repair, CABG Nutrition note 7/24     Medication     x Treatment Recommendation:   1. Continue to encourage intake of meals & supplements.     Goals:   Pt will consume at least 50-75% intake at meals  Nutrition Goal Status: (continues)  Communication of RD Recs: reviewed with RN Nutrition note 7/24    x Other Mild malnutrition  Malnutrition in the context of Acute Illness/Injury Nutrition note 7/24      AND / ASPEN Clinical Characteristics (October 2011)  A minimum of two characteristics is recommended for diagnosing either moderate or severe malnutrition    Mild Malnutrition Moderate Malnutrition Severe Malnutrition   Energy Intake from p.o., TF or TPN. < 75% intake of estimated energy needs for less than 7 days < 75% intake of estimated energy needs for greater than 7 days < 50% intake of estimated energy needs for > 5 days   Weight Loss 1-2% in 1 month  5% in 3 months  7.5% in 6 months  10% in 1 year 1-2 % in 1 week  5% in 1 month  7.5% in 3 months  10% in 6 months  20% in 1 year > 2% in 1 week  > 5% in 1 month  > 7.5% in 3 months  > 10% in 6 months  > 20% in 1 year   Physical Findings     None *Mild subcutaneous fat and/or muscle loss  *Mild fluid accumulation  *Stage II decubitus  *Surgical wound or non-healing wound *Mod/severe subcutaneous fat and/or muscle loss  *Mod/severe fluid accumulation  *Stage III or IV decubitus  *Non-healing surgical wound     Provider, please specify diagnosis or diagnoses associated with above clinical findings.    [ x ] Mild Protein-Calorie Malnutrition   [  ] Other Nutritional Diagnosis (please specify):    [  ] Other:    [  ] Clinically Undetermined       Please document in your progress notes daily for the duration of treatment until resolved and include in your discharge summary.

## 2019-08-28 LAB — FUNGUS SPEC CULT: ABNORMAL

## 2019-09-24 LAB
ACID FAST MOD KINY STN SPEC: NORMAL
MYCOBACTERIUM SPEC QL CULT: NORMAL

## 2021-10-25 NOTE — PT/OT/SLP PROGRESS
Speech Language Pathology Treatment    Patient Name:  Jose Martin Hutchins   MRN:  [id number]  Admitting Diagnosis: Intraperitoneal hemorrhage    Recommendations:                 General Recommendations: ongoing swallow assessment  Diet recommendations: Liquid Diet Level: Full liquids, Thin   Aspiration Precautions: 1 bite/sip at a time, Alternating bites/sips, Assistance with meals, Avoid talking while eating, Check for pocketing/oral residue, Eliminate distractions, Feed only when awake/alert, Frequent oral care, HOB to 90 degrees, Meds whole 1 at a time, Monitor for s/s of aspiration, Remain upright 30 minutes post meal, Small bites/sips and Standard aspiration precautions   General Precautions: Standard, fall    Subjective     Pt asleep as ST entered room--awakened with verbal/tactile stimulation. RN and pt's spouse at bedside report poor PO intake 2/2 complaints of odynophagia, abdominal pain, and oral cavity pain. Pt's spouse reports pt has been consuming homemade smoothies with glucerna as primary source of nutrition since dental sx in May 2019.    Pain/Comfort:  · Pain Rating 1: 0/10    Objective:     Has the patient been evaluated by SLP for swallowing?   Yes  Keep patient NPO? No   Current Respiratory Status: room air      SLP repositioned pt to upright sitting position. Pt presenting with limited participation but consuming thin liquids and puree consistencies without overt s/s of aspiration or airway threat. Pt did demonstrate timely AP transfer and good oral clearance during liquid trials. Pt with timely trigger of swallow with fair laryngeal elevation/ excursion.    SLP educated pt and pt's wife on role of SLP, clinical swallow results, diet recs/swallow precautions and POC. SLP answered all questions/concerns presented by pt and pt's wife, within SLP's scope of practice. SLP also informed pt and pt's wife on importance of participation in PO intake to ensure pt is meeting nutritional needs, d/t report of  "pt with dcr'd PO intake and weight loss since May 2019.  Pt and pt's wife acknowledged and confirmed understanding. Pt may need reinforcement of education provided.     Assessment:     Jose Martin Hutchins is a 78 y.o. male presents with poor PO intake 2/2 complaints of odynophagia and oral cavity pain. Pt with limited participation in PO trials 2/2 pt's c/o abdominal pain/cramping and "burning mouth" sensation. Pt consuming thin liquids and puree consistencies without clinical s/s of aspiration or airway threat. SLP recs: Full Thin Liquid Diet with universal swallow precautions (upright at 90 degrees, alternate sips/bites, 1 bite/sip at a time, remain upright for 30 mins after meals, whole meds 1 by, etc.) SLP will continue to follow to advance diet as tolerated.     Goals:   Multidisciplinary Problems     SLP Goals        Problem: SLP Goal    Goal Priority Disciplines Outcome   SLP Goal     SLP Ongoing (interventions implemented as appropriate)   Description:  Short Term Goals:  1. Pt will participate in BSS to determine least restrictive diet.--ongoing  2. Pt will tolerate clear liquids PO diet with no overt s/s of aspiration. --MET 7/21  3. Pt will safely consume full liquid diet w/ thin liquids w/ no overt s/s of aspiration.                 Plan:     · Patient to be seen:  3 x/week   · Plan of Care expires:  08/19/19  · Plan of Care reviewed with:  patient, other (see comments), spouse, family(OLIMPIA Arechiga)   · SLP Follow-Up:  Yes       Discharge recommendations:  rehabilitation facility     Time Tracking:     SLP Treatment Date:   07/21/19  Speech Start Time:  1433  Speech Stop Time:  1452     Speech Total Time (min):  19 min    Billable Minutes: Treatment Swallowing Dysfunction 10 minutes and Seld Care/Home Management Training 9 minutes    Maggi Loya CCC-SLP  07/21/2019  " Head is atraumatic. Head shape is symmetrical.

## 2022-09-19 NOTE — ASSESSMENT & PLAN NOTE
Pt transferred from [facility] with intraperitoneal hemorrhage requesting IR consult for embolization  CT abdomen: Acute approximately 15 x 4 cm hematoma within the region of the lesser sac with signs of active contrast extravasation.  IR consulted, currently no need for embolization at this time  Surgery consulted, signed off  H/H stable  Repeat CT Abdomen shows improvement in the size of hematoma. Incidental pancreatic lesions.   MRI ordered for outpatient     caps/bridge/implants

## 2022-12-08 NOTE — PLAN OF CARE
Problem: Adult Inpatient Plan of Care  Goal: Plan of Care Review  Outcome: Ongoing (interventions implemented as appropriate)  Patient AAOx3 in AM. VSS. Blood glucose monitored. Patient presented with confusion and chills when waking up from nap around lunch time. Vitals signs were stable. MD notified and at bedside assessing patient. New orders for CT placed. Patient calmed down and resting after lunch. Patient receiving IV antibiotics per MD order. Free from falls. Heart monitor remains in place. SCDs in use. Family at bedside. Safety maintained. Will continue to monitor.     Problem: Infection  Goal: Infection Symptom Resolution  Outcome: Ongoing (interventions implemented as appropriate)       Problem: Fall Injury Risk  Goal: Absence of Fall and Fall-Related Injury  Outcome: Ongoing (interventions implemented as appropriate)       Problem: Diabetes Comorbidity  Goal: Blood Glucose Level Within Desired Range  Outcome: Ongoing (interventions implemented as appropriate)          Home

## 2024-09-13 NOTE — PROGRESS NOTES
-Pleasantly confused  -Patient is high risk of delirium due to dementia at baseline, UTI, pain, constipation, change in environment  - delirium precautions  -maintain normal sleep/wake cycle  -minimize overnight interruptions, group overnight vitals/labs/nursing checks as possible  -dim lights, close blinds and turn off tv to minimize stimulation and encourage sleep environment in evenings  -ensure that pain is well controlled continue Tylenol 975mg Q8H scheduled , gabapentin 100 mg 2 times a day, lidocaine patch to lower back  -monitor for fecal and urinary retention which may precipitate delirium  -encourage early mobilization and ambulation  -provide frequent reorientation and redirection  -encourage family and friends at the bedside to help calm patient if anxious  -avoid medications which may precipitate or worsen delirium such as tramadol, benzodiazepine, anticholinergics, and antihistaminics  -encourage hydration and nutrition , assist with feeding if needed  -redirect unwanted behaviors as first line, avoid physical restraints.   -Continue Cymbalta 20 mg daily  -Zyprexa 2.5 mg every 8 hours as needed only for severe agitation, monitor QTc   Pharmacist Renal Dose Adjustment Note    Jose Martin Hutchins is a 78 y.o. male being treated with the medication cefepime    Patient Data:    Vital Signs (Most Recent):  Temp: 97.4 °F (36.3 °C) (07/23/19 1532)  Pulse: 81 (07/23/19 1544)  Resp: 18 (07/23/19 1532)  BP: (!) 141/79 (07/23/19 1532)  SpO2: 97 % (07/23/19 1620)   Vital Signs (72h Range):  Temp:  [96.1 °F (35.6 °C)-98.7 °F (37.1 °C)]   Pulse:  []   Resp:  [12-46]   BP: ()/(51-89)   SpO2:  [86 %-100 %]      Recent Labs   Lab 07/21/19  0410 07/22/19  0430 07/23/19  0530   CREATININE 3.0* 2.4* 1.9*     Serum creatinine: 1.9 mg/dL (H) 07/23/19 0530  Estimated creatinine clearance: 32.6 mL/min (A)    Medication:cefepime 2g q24h will be changed to medication:cefepime 2g q12h    Pharmacist's Name: Carolina Piper  Pharmacist's Extension: [phone]